# Patient Record
Sex: FEMALE | Race: WHITE | Employment: UNEMPLOYED | URBAN - METROPOLITAN AREA
[De-identification: names, ages, dates, MRNs, and addresses within clinical notes are randomized per-mention and may not be internally consistent; named-entity substitution may affect disease eponyms.]

---

## 2017-07-03 ENCOUNTER — ED HISTORICAL/CONVERTED ENCOUNTER (OUTPATIENT)
Dept: OTHER | Age: 72
End: 2017-07-03

## 2017-08-03 ENCOUNTER — OP HISTORICAL/CONVERTED ENCOUNTER (OUTPATIENT)
Dept: OTHER | Age: 72
End: 2017-08-03

## 2017-11-10 ENCOUNTER — OP HISTORICAL/CONVERTED ENCOUNTER (OUTPATIENT)
Dept: OTHER | Age: 72
End: 2017-11-10

## 2017-11-16 ENCOUNTER — OP HISTORICAL/CONVERTED ENCOUNTER (OUTPATIENT)
Dept: OTHER | Age: 72
End: 2017-11-16

## 2018-02-22 ENCOUNTER — TELEPHONE (OUTPATIENT)
Dept: DERMATOLOGY | Facility: AMBULATORY SURGERY CENTER | Age: 73
End: 2018-02-22

## 2018-02-22 NOTE — TELEPHONE ENCOUNTER
Attempted to reach pt for pre-op. Number provided is not a working number. This is also the same number as provided on the demographic sheet

## 2018-02-28 ENCOUNTER — OFFICE VISIT (OUTPATIENT)
Dept: DERMATOLOGY | Facility: AMBULATORY SURGERY CENTER | Age: 73
End: 2018-02-28

## 2018-02-28 VITALS
SYSTOLIC BLOOD PRESSURE: 160 MMHG | HEART RATE: 62 BPM | RESPIRATION RATE: 16 BRPM | TEMPERATURE: 98.5 F | OXYGEN SATURATION: 99 % | DIASTOLIC BLOOD PRESSURE: 78 MMHG

## 2018-02-28 DIAGNOSIS — C44.319 BASAL CELL CARCINOMA OF LEFT FOREHEAD: Primary | ICD-10-CM

## 2018-02-28 RX ORDER — IBANDRONATE SODIUM 150 MG/1
TABLET, FILM COATED ORAL
COMMUNITY
Start: 2018-02-12 | End: 2021-01-01

## 2018-02-28 RX ORDER — HYDROCHLOROTHIAZIDE 12.5 MG/1
12.5 TABLET ORAL DAILY
COMMUNITY
End: 2021-01-01

## 2018-02-28 RX ORDER — ERGOCALCIFEROL 1.25 MG/1
50000 CAPSULE ORAL
COMMUNITY
End: 2021-01-01

## 2018-02-28 RX ORDER — AMLODIPINE BESYLATE 5 MG/1
5 TABLET ORAL DAILY
COMMUNITY
End: 2021-01-01

## 2018-02-28 RX ORDER — METOPROLOL TARTRATE 50 MG/1
TABLET ORAL 2 TIMES DAILY
COMMUNITY
End: 2021-01-01

## 2018-02-28 RX ORDER — LOVASTATIN 10 MG/1
TABLET ORAL
COMMUNITY
End: 2021-01-01

## 2018-02-28 NOTE — PATIENT INSTRUCTIONS
WOUND CARE INSTRUCTIONS    1. Keep the dressing clean and dry and do not remove for 48 hours. 2. Then change the dressing once a day as follows:  a. Wash hands before and after each dressing change. b. Remove dressing and wash site gently with mild soap and water, rinse, and pat dry.  c. Apply an ointment (Bacitracin, Polysporin, Neosporin, Petroleum jelly or Aquaphor). d. Apply a non-stick (Telfa) dressing or Band-Aid to cover the wound. Remove pressure bandage on Friday. You may shower daily at this point, no bandage necessary. Glue will eventually come off within the next 2 weeks. If you still feel rough glue at this point, you may apply vaseline to site daily until fully removed. 3. Watch for:  BLEEDING: A small amount of drainage may occur. If bleeding occurs, elevate and rest the surgery site. Apply gauze and steady pressure for 15 minutes. If bleeding continues, call this office. INFECTION: Signs of infection include increased redness, pain, warmth, drainage of pus, and fever. If this occurs, call this office. 4. Special Instructions (follow any that are checked):  · [x] You have stitches that DO NOT need to be removed. · [x] Avoid bending at the waist and heavy lifting for two days. · [x] Sleep with your head elevated for the next two nights. · [x] Rest the surgery site and keep it elevated as much as possible for two days. · [x] You may apply an ice-pack for 10-15 minutes every waking hour for the rest of the day. · [] Eat a soft diet and avoid hot food and hot drinks for the rest of the day. · [] Other instructions: Follow up as directed. Take Tylenol or Ibuprofen for pain as needed. Once the site is healed with no remaining bandages or open areas, protect your surgical site and scar from the sun, as this area will be more sensitive.   Use a broad spectrum sunscreen SPF 30 or higher daily, and a chemical free product (one containing zinc oxide or titanium dioxide) is a good choice if the area is sensitive. You may begin to gently massage the surgical site in 2-3 weeks, rubbing in a circular motion along the scar. This can help reduce swelling and thickness of a scar. A scar cream may be used beginnning 1 month after the surgery. If you have any questions or concerns, please call our office Monday through Friday at [phone].

## 2018-02-28 NOTE — PROGRESS NOTES
This note is written by Clarence Ceja, as dictated by Rola Neal. Thu Cortez MD.    CC: Basal cell carcinoma on the left lateral forehead    History of present illness:     Pearl Gowers is a 68 y.o. female referred by TAMRA Olivia. 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 This is a new basal cell carcinoma present for a long time described as a crusted lesion with no prior treatment. Biopsy confirmed the diagnosis of basal cell carcinoma, and I reviewed the written pathology. She is feeling well and in her usual state of health today. She has no pain, no current illnesses, no other skin concerns. Her allergies, medications, medical, and social history are reviewed by me today. Exam:     She is an awake, alert, and oriented 68 y.o. female who appears well and in no distress. There is no preauricular, submandibular, or cervical lymphadenopathy. I examined her left lateral forehead. She has a 16 x 11 mm pearly pigmented nodule on her left lateral forehead. She confirms location. Assessment/plan:    1. Basal cell carcinoma, left lateral forehead. I discussed the diagnosis of basal cell carcinoma and summarized the pathology report. Mohs surgery is indicated by site, size, and histology. The procedure was discussed, verbal and written consent were obtained. I performed the procedure. One stage was required to reach a tumor free plane. The surgical defect was managed with intermediate repair. There were no complications. She will follow up as needed as the site heals. Indications, risks, and options were discussed with Pearl Gowers preoperatively. Risks including, but not limited to: pain, bleeding, infection, tumor recurrence, scarring and damage to motor and/or sensory nerves, were discussed. Pearl Gowers chose Mohs surgery. Pearl Gowers was an acceptable surgery candidate.     Pearl Gowers was placed in the appropriate position on the operating table in the Mohs surgery procedure room. The area was prepped and draped in the standard manner. Gentian violet was used to outline the clinical margins of the tumor. Local anesthesia was then obtained. The grossly visible tumor was then removed, an underlying layer was excised and mapped according to the Mohs technique, and the individual specimens examined microscopically. The process was repeated until microscopic examination of the tissue specimens confirmed a tumor-free plane. Hemostasis was obtained with electrosurgery and pressure. The wound was covered between stages with moist saline gauze. The wound management options of second intent healing, layered closure, local flap, and/or full thickness skin graft were discussed. Constance Ortiz understands the aims, risks, alternatives, and possible complications and elects to proceed with an intermediate layered closure. Wound margins were made vertical, edges undermined in the fascial plane, standing cones corrected at both poles followed by layered closure. The wound was closed with buried 5-0 polysorb suture in the subcutis to reduce tension on the skin edges, and skin edges were approximated with 5-0 polysorb suture in the dermis to reduce tension on the epidermis. The final closure length was 34 mm. The wound was bandaged with skin glue, Telfa, gauze and Coverroll. Wound care instructions (written and verbal) and a follow up appointment were given to Constance Ortiz before discharge. Constance Ortiz was discharged in good condition. 2. History of nonmelanoma skin cancer. I discussed the diagnosis and recommend routine examinations with TAMRA Pérez for surveillance. The documentation recorded by the scribe accurately reflects the service I personally performed and the decisions made by me.      [facility]   OFFICE PROCEDURE PROGRESS NOTE     Chart reviewed for the following: Yvonne Garcia MD, have reviewed the History, Physical and updated the Allergic reactions for [address] performed immediately prior to start of procedure:     Yvonne Larson. Rhonda Garcia MD, have performed the following reviews on Lucille Epstein prior to the start of the procedure:     * Patient was identified by name and date of birth   * Agreement on procedure being performed was verified   * Risks and Benefits explained to the patient   * Procedure site verified and marked as necessary   * Patient was positioned for comfort   * Consent was signed and verified     Time: 8:38 AM   Date of procedure: 2/28/2018  Procedure performed by: George Spangler.  Rhonda Garcia MD   Provider assisted by: RN   Patient assisted by: self   How tolerated by patient: tolerated the procedure well with no complications   Comments: none

## 2018-02-28 NOTE — MR AVS SNAPSHOT
[facility] [address] 
[phone] Patient: Roman Meraz MRN: [id number] :1945 Visit Information Date & Time Provider Department Dept. Phone Encounter #  
 2018  8:30 AM MD Violette Zamoraxiomara [id number] [phone] [id number] Upcoming Health Maintenance Date Due Hepatitis C Screening 1945 DTaP/Tdap/Td series (1 - Tdap) 1967 BREAST CANCER SCRN MAMMOGRAM 1996 FOBT Q 1 YEAR AGE 50-75 1996 ZOSTER VACCINE AGE 60> 2005 GLAUCOMA SCREENING Q2Y 2011 OSTEOPOROSIS SCREENING (DEXA) 2011 Pneumococcal 65+ Low/Medium Risk (1 of 2 - PCV13) 2011 MEDICARE YEARLY EXAM 2011 Influenza Age 5 to Adult 2017 Allergies as of 2018  Review Complete On: 2018 By: Michael Presley MD  
 No Known Allergies Current Immunizations  Never Reviewed No immunizations on file. Not reviewed this visit You Were Diagnosed With   
  
 Codes Comments Basal cell carcinoma of left forehead    -  Primary ICD-10-CM: C44.319 ICD-9-CM: 173.31 Vitals BP Pulse Temp Resp SpO2 Smoking Status 160/78 (BP 1 Location: Left arm, BP Patient Position: Sitting) 62 98.5 °F (36.9 °C) (Oral) 16 99% Current Every Day Smoker Your Updated Medication List  
  
   
This list is accurate as of 18  8:55 AM.  Always use your most recent med list. amLODIPine 5 mg tablet Commonly known as:  Lynnell Manual Take 5 mg by mouth daily. ASPIRIN PO Take  by mouth. hydroCHLOROthiazide 12.5 mg tablet Commonly known as:  HYDRODIURIL Take 12.5 mg by mouth daily. ibandronate 150 mg tablet Commonly known as:  BONIVA  
  
 lovastatin 10 mg tablet Commonly known as:  MEVACOR Take  by mouth nightly. metoprolol tartrate 50 mg tablet Commonly known as:  LOPRESSOR Take  by mouth two (2) times a day. VITAMIN D2 50,000 unit capsule Generic drug:  ergocalciferol Take 50,000 Units by mouth. Patient Instructions WOUND CARE INSTRUCTIONS 1. Keep the dressing clean and dry and do not remove for 48 hours. 2. Then change the dressing once a day as follows: 
a. Wash hands before and after each dressing change. b. Remove dressing and wash site gently with mild soap and water, rinse, and pat dry. 
c. Apply an ointment (Bacitracin, Polysporin, Neosporin, Petroleum jelly or Aquaphor). d. Apply a non-stick (Telfa) dressing or Band-Aid to cover the wound. Remove pressure bandage on Friday. You may shower daily at this point, no bandage necessary. Glue will eventually come off within the next 2 weeks. If you still feel rough glue at this point, you may apply vaseline to site daily until fully removed. 3. Watch for: BLEEDING: A small amount of drainage may occur. If bleeding occurs, elevate and rest the surgery site. Apply gauze and steady pressure for 15 minutes. If bleeding continues, call this office. INFECTION: Signs of infection include increased redness, pain, warmth, drainage of pus, and fever. If this occurs, call this office. 4. Special Instructions (follow any that are checked): 
· [x] You have stitches that DO NOT need to be removed. · [x] Avoid bending at the waist and heavy lifting for two days. · [x] Sleep with your head elevated for the next two nights. · [x] Rest the surgery site and keep it elevated as much as possible for two days. · [x] You may apply an ice-pack for 10-15 minutes every waking hour for the rest of the day. · [] Eat a soft diet and avoid hot food and hot drinks for the rest of the day. · [] Other instructions: Follow up as directed. Take Tylenol or Ibuprofen for pain as needed.  
 
 
Once the site is healed with no remaining bandages or open areas, protect your surgical site and scar from the sun, as this area will be more sensitive. Use a broad spectrum sunscreen SPF 30 or higher daily, and a chemical free product (one containing zinc oxide or titanium dioxide) is a good choice if the area is sensitive. You may begin to gently massage the surgical site in 2-3 weeks, rubbing in a circular motion along the scar. This can help reduce swelling and thickness of a scar. A scar cream may be used beginnning 1 month after the surgery. If you have any questions or concerns, please call our office Monday through Friday at [phone]. Introducing [facility]! [facility] introduces Clipsure patient portal. Now you can access parts of your medical record, email your doctor's office, and request medication refills online. 1. In your internet browser, go to [email or web address] 2. Click on the First Time User? Click Here link in the Sign In box. You will see the New Member Sign Up page. 3. Enter your Clipsure Access Code exactly as it appears below. You will not need to use this code after youve completed the sign-up process. If you do not sign up before the expiration date, you must request a new code. · Clipsure Access Code: [email or web address] Expires: 5/22/2018 12:51 PM 
 
4. Enter the last four digits of your Social Security Number (xxxx) and Date of Birth (mm/dd/yyyy) as indicated and click Submit. You will be taken to the next sign-up page. 5. Create a Gamervisiont ID. This will be your Clipsure login ID and cannot be changed, so think of one that is secure and easy to remember. 6. Create a Clipsure password. You can change your password at any time. 7. Enter your Password Reset Question and Answer. This can be used at a later time if you forget your password. 8. Enter your e-mail address. You will receive e-mail notification when new information is available in [address] 9. Click Sign Up. You can now view and download portions of your medical record.  
10. Click the Download Summary menu link to download a portable copy of your medical information. If you have questions, please visit the Frequently Asked Questions section of the Hublished website. Remember, Hublished is NOT to be used for urgent needs. For medical emergencies, dial 911. Now available from your iPhone and Android! Please provide this summary of care documentation to your next provider. If you have any questions after today's visit, please call [phone].

## 2018-10-22 ENCOUNTER — OP HISTORICAL/CONVERTED ENCOUNTER (OUTPATIENT)
Dept: OTHER | Age: 73
End: 2018-10-22

## 2021-01-01 ENCOUNTER — APPOINTMENT (OUTPATIENT)
Dept: GENERAL RADIOLOGY | Age: 76
DRG: 177 | End: 2021-01-01
Attending: INTERNAL MEDICINE
Payer: MEDICARE

## 2021-01-01 ENCOUNTER — APPOINTMENT (OUTPATIENT)
Dept: GENERAL RADIOLOGY | Age: 76
DRG: 193 | End: 2021-01-01
Attending: NURSE PRACTITIONER
Payer: MEDICARE

## 2021-01-01 ENCOUNTER — APPOINTMENT (OUTPATIENT)
Dept: GENERAL RADIOLOGY | Age: 76
DRG: 177 | End: 2021-01-01
Attending: STUDENT IN AN ORGANIZED HEALTH CARE EDUCATION/TRAINING PROGRAM
Payer: MEDICARE

## 2021-01-01 ENCOUNTER — APPOINTMENT (OUTPATIENT)
Dept: ENDOSCOPY | Age: 76
DRG: 193 | End: 2021-01-01
Attending: INTERNAL MEDICINE
Payer: MEDICARE

## 2021-01-01 ENCOUNTER — APPOINTMENT (OUTPATIENT)
Dept: GENERAL RADIOLOGY | Age: 76
DRG: 193 | End: 2021-01-01
Attending: STUDENT IN AN ORGANIZED HEALTH CARE EDUCATION/TRAINING PROGRAM
Payer: MEDICARE

## 2021-01-01 ENCOUNTER — APPOINTMENT (OUTPATIENT)
Dept: GENERAL RADIOLOGY | Age: 76
DRG: 177 | End: 2021-01-01
Attending: EMERGENCY MEDICINE
Payer: MEDICARE

## 2021-01-01 ENCOUNTER — ANESTHESIA (OUTPATIENT)
Dept: ENDOSCOPY | Age: 76
DRG: 193 | End: 2021-01-01
Payer: MEDICARE

## 2021-01-01 ENCOUNTER — HOSPITAL ENCOUNTER (OUTPATIENT)
Dept: CT IMAGING | Age: 76
Discharge: HOME | End: 2021-03-04
Payer: MEDICARE

## 2021-01-01 ENCOUNTER — APPOINTMENT (OUTPATIENT)
Dept: CT IMAGING | Age: 76
DRG: 193 | End: 2021-01-01
Attending: STUDENT IN AN ORGANIZED HEALTH CARE EDUCATION/TRAINING PROGRAM
Payer: MEDICARE

## 2021-01-01 ENCOUNTER — HOSPITAL ENCOUNTER (INPATIENT)
Age: 76
LOS: 7 days | Discharge: HOME HEALTH | DRG: 193 | End: 2021-11-16
Attending: STUDENT IN AN ORGANIZED HEALTH CARE EDUCATION/TRAINING PROGRAM | Admitting: INTERNAL MEDICINE
Payer: MEDICARE

## 2021-01-01 ENCOUNTER — ANESTHESIA EVENT (OUTPATIENT)
Dept: ENDOSCOPY | Age: 76
DRG: 193 | End: 2021-01-01
Payer: MEDICARE

## 2021-01-01 ENCOUNTER — APPOINTMENT (OUTPATIENT)
Dept: CT IMAGING | Age: 76
DRG: 177 | End: 2021-01-01
Attending: EMERGENCY MEDICINE
Payer: MEDICARE

## 2021-01-01 ENCOUNTER — APPOINTMENT (OUTPATIENT)
Dept: GENERAL RADIOLOGY | Age: 76
DRG: 193 | End: 2021-01-01
Attending: INTERNAL MEDICINE
Payer: MEDICARE

## 2021-01-01 ENCOUNTER — HOSPITAL ENCOUNTER (INPATIENT)
Age: 76
LOS: 15 days | DRG: 177 | End: 2021-12-23
Attending: EMERGENCY MEDICINE | Admitting: INTERNAL MEDICINE
Payer: MEDICARE

## 2021-01-01 ENCOUNTER — APPOINTMENT (OUTPATIENT)
Dept: NON INVASIVE DIAGNOSTICS | Age: 76
DRG: 177 | End: 2021-01-01
Attending: INTERNAL MEDICINE
Payer: MEDICARE

## 2021-01-01 VITALS
RESPIRATION RATE: 22 BRPM | BODY MASS INDEX: 14.66 KG/M2 | DIASTOLIC BLOOD PRESSURE: 76 MMHG | SYSTOLIC BLOOD PRESSURE: 190 MMHG | HEIGHT: 65 IN | WEIGHT: 87.96 LBS | TEMPERATURE: 97.6 F | OXYGEN SATURATION: 97 % | HEART RATE: 64 BPM

## 2021-01-01 VITALS
DIASTOLIC BLOOD PRESSURE: 69 MMHG | WEIGHT: 81.2 LBS | SYSTOLIC BLOOD PRESSURE: 145 MMHG | HEIGHT: 64 IN | OXYGEN SATURATION: 95 % | TEMPERATURE: 97.9 F | RESPIRATION RATE: 21 BRPM | HEART RATE: 87 BPM | BODY MASS INDEX: 13.86 KG/M2

## 2021-01-01 VITALS — WEIGHT: 100 LBS | BODY MASS INDEX: 16.66 KG/M2 | HEIGHT: 65 IN

## 2021-01-01 DIAGNOSIS — R62.51 FAILURE TO THRIVE (CHILD): ICD-10-CM

## 2021-01-01 DIAGNOSIS — R06.02 SOB (SHORTNESS OF BREATH): Primary | ICD-10-CM

## 2021-01-01 DIAGNOSIS — J96.01 ACUTE RESPIRATORY FAILURE WITH HYPOXIA (HCC): Primary | ICD-10-CM

## 2021-01-01 DIAGNOSIS — J44.9 CHRONIC OBSTRUCTIVE PULMONARY DISEASE, UNSPECIFIED COPD TYPE (HCC): ICD-10-CM

## 2021-01-01 DIAGNOSIS — F17.210 NICOTINE DEPENDENCE, CIGARETTES, UNCOMPLICATED: ICD-10-CM

## 2021-01-01 DIAGNOSIS — U07.1 COVID-19: ICD-10-CM

## 2021-01-01 LAB
25(OH)D3 SERPL-MCNC: 48.8 NG/ML (ref 30–100)
25(OH)D3 SERPL-MCNC: 51.2 NG/ML (ref 30–100)
ALBUMIN SERPL-MCNC: 2.2 G/DL (ref 3.5–5)
ALBUMIN SERPL-MCNC: 2.4 G/DL (ref 3.5–5)
ALBUMIN SERPL-MCNC: 2.7 G/DL (ref 3.5–5)
ALBUMIN SERPL-MCNC: 3 G/DL (ref 3.5–5)
ALBUMIN SERPL-MCNC: 3.1 G/DL (ref 3.5–5)
ALBUMIN SERPL-MCNC: 3.8 G/DL (ref 3.5–5)
ALBUMIN/GLOB SERPL: 0.6 {RATIO} (ref 1.1–2.2)
ALBUMIN/GLOB SERPL: 0.8 {RATIO} (ref 1.1–2.2)
ALBUMIN/GLOB SERPL: 0.9 {RATIO} (ref 1.1–2.2)
ALBUMIN/GLOB SERPL: 1 {RATIO} (ref 1.1–2.2)
ALP SERPL-CCNC: 67 U/L (ref 45–117)
ALP SERPL-CCNC: 71 U/L (ref 45–117)
ALP SERPL-CCNC: 72 U/L (ref 45–117)
ALP SERPL-CCNC: 72 U/L (ref 45–117)
ALT SERPL-CCNC: 20 U/L (ref 12–78)
ALT SERPL-CCNC: 29 U/L (ref 12–78)
ALT SERPL-CCNC: 39 U/L (ref 12–78)
ALT SERPL-CCNC: 40 U/L (ref 12–78)
ANION GAP SERPL CALC-SCNC: 10 MMOL/L (ref 5–15)
ANION GAP SERPL CALC-SCNC: 3 MMOL/L (ref 5–15)
ANION GAP SERPL CALC-SCNC: 4 MMOL/L (ref 5–15)
ANION GAP SERPL CALC-SCNC: 6 MMOL/L (ref 5–15)
ANION GAP SERPL CALC-SCNC: 7 MMOL/L (ref 5–15)
ANION GAP SERPL CALC-SCNC: 7 MMOL/L (ref 5–15)
ANION GAP SERPL CALC-SCNC: 8 MMOL/L (ref 5–15)
ANION GAP SERPL CALC-SCNC: 9 MMOL/L (ref 5–15)
AST SERPL W P-5'-P-CCNC: 21 U/L (ref 15–37)
AST SERPL W P-5'-P-CCNC: 24 U/L (ref 15–37)
AST SERPL W P-5'-P-CCNC: 31 U/L (ref 15–37)
AST SERPL W P-5'-P-CCNC: 51 U/L (ref 15–37)
ATRIAL RATE: 170 BPM
ATRIAL RATE: 97 BPM
BACTERIA SPEC CULT: NORMAL
BASOPHILS # BLD: 0 K/UL (ref 0–0.1)
BASOPHILS # BLD: 0.1 K/UL (ref 0–0.1)
BASOPHILS # BLD: 0.1 K/UL (ref 0–0.1)
BASOPHILS NFR BLD: 0 % (ref 0–1)
BILIRUB SERPL-MCNC: 0.4 MG/DL (ref 0.2–1)
BILIRUB SERPL-MCNC: 0.4 MG/DL (ref 0.2–1)
BILIRUB SERPL-MCNC: 0.6 MG/DL (ref 0.2–1)
BILIRUB SERPL-MCNC: 0.7 MG/DL (ref 0.2–1)
BNP SERPL-MCNC: 1228 PG/ML
BNP SERPL-MCNC: 244 PG/ML
BUN SERPL-MCNC: 15 MG/DL (ref 6–20)
BUN SERPL-MCNC: 17 MG/DL (ref 6–20)
BUN SERPL-MCNC: 21 MG/DL (ref 6–20)
BUN SERPL-MCNC: 22 MG/DL (ref 6–20)
BUN SERPL-MCNC: 22 MG/DL (ref 6–20)
BUN SERPL-MCNC: 23 MG/DL (ref 6–20)
BUN SERPL-MCNC: 23 MG/DL (ref 6–20)
BUN SERPL-MCNC: 26 MG/DL (ref 6–20)
BUN SERPL-MCNC: 31 MG/DL (ref 6–20)
BUN SERPL-MCNC: 33 MG/DL (ref 6–20)
BUN SERPL-MCNC: 35 MG/DL (ref 6–20)
BUN SERPL-MCNC: 39 MG/DL (ref 6–20)
BUN SERPL-MCNC: 41 MG/DL (ref 6–20)
BUN SERPL-MCNC: 48 MG/DL (ref 6–20)
BUN SERPL-MCNC: 50 MG/DL (ref 6–20)
BUN/CREAT SERPL: 31 (ref 12–20)
BUN/CREAT SERPL: 32 (ref 12–20)
BUN/CREAT SERPL: 35 (ref 12–20)
BUN/CREAT SERPL: 35 (ref 12–20)
BUN/CREAT SERPL: 38 (ref 12–20)
BUN/CREAT SERPL: 38 (ref 12–20)
BUN/CREAT SERPL: 42 (ref 12–20)
BUN/CREAT SERPL: 43 (ref 12–20)
BUN/CREAT SERPL: 46 (ref 12–20)
BUN/CREAT SERPL: 50 (ref 12–20)
BUN/CREAT SERPL: 51 (ref 12–20)
BUN/CREAT SERPL: 53 (ref 12–20)
BUN/CREAT SERPL: 61 (ref 12–20)
BUN/CREAT SERPL: 63 (ref 12–20)
BUN/CREAT SERPL: 64 (ref 12–20)
CA-I BLD-MCNC: 10 MG/DL (ref 8.5–10.1)
CA-I BLD-MCNC: 10.3 MG/DL (ref 8.5–10.1)
CA-I BLD-MCNC: 10.5 MG/DL (ref 8.5–10.1)
CA-I BLD-MCNC: 8 MG/DL (ref 8.5–10.1)
CA-I BLD-MCNC: 8.2 MG/DL (ref 8.5–10.1)
CA-I BLD-MCNC: 8.4 MG/DL (ref 8.5–10.1)
CA-I BLD-MCNC: 8.6 MG/DL (ref 8.5–10.1)
CA-I BLD-MCNC: 8.6 MG/DL (ref 8.5–10.1)
CA-I BLD-MCNC: 8.7 MG/DL (ref 8.5–10.1)
CA-I BLD-MCNC: 8.7 MG/DL (ref 8.5–10.1)
CA-I BLD-MCNC: 8.9 MG/DL (ref 8.5–10.1)
CA-I BLD-MCNC: 8.9 MG/DL (ref 8.5–10.1)
CA-I BLD-MCNC: 9.3 MG/DL (ref 8.5–10.1)
CA-I BLD-MCNC: 9.4 MG/DL (ref 8.5–10.1)
CA-I BLD-MCNC: 9.6 MG/DL (ref 8.5–10.1)
CALCULATED P AXIS, ECG09: 81 DEGREES
CALCULATED R AXIS, ECG10: 63 DEGREES
CALCULATED R AXIS, ECG10: 68 DEGREES
CALCULATED T AXIS, ECG11: 109 DEGREES
CALCULATED T AXIS, ECG11: 81 DEGREES
CHLORIDE SERPL-SCNC: 102 MMOL/L (ref 97–108)
CHLORIDE SERPL-SCNC: 102 MMOL/L (ref 97–108)
CHLORIDE SERPL-SCNC: 104 MMOL/L (ref 97–108)
CHLORIDE SERPL-SCNC: 104 MMOL/L (ref 97–108)
CHLORIDE SERPL-SCNC: 105 MMOL/L (ref 97–108)
CHLORIDE SERPL-SCNC: 106 MMOL/L (ref 97–108)
CHLORIDE SERPL-SCNC: 106 MMOL/L (ref 97–108)
CHLORIDE SERPL-SCNC: 108 MMOL/L (ref 97–108)
CHLORIDE SERPL-SCNC: 94 MMOL/L (ref 97–108)
CHLORIDE SERPL-SCNC: 96 MMOL/L (ref 97–108)
CHLORIDE SERPL-SCNC: 96 MMOL/L (ref 97–108)
CHLORIDE SERPL-SCNC: 97 MMOL/L (ref 97–108)
CHLORIDE SERPL-SCNC: 97 MMOL/L (ref 97–108)
CHLORIDE SERPL-SCNC: 98 MMOL/L (ref 97–108)
CHLORIDE SERPL-SCNC: 99 MMOL/L (ref 97–108)
CO2 SERPL-SCNC: 24 MMOL/L (ref 21–32)
CO2 SERPL-SCNC: 25 MMOL/L (ref 21–32)
CO2 SERPL-SCNC: 26 MMOL/L (ref 21–32)
CO2 SERPL-SCNC: 27 MMOL/L (ref 21–32)
CO2 SERPL-SCNC: 27 MMOL/L (ref 21–32)
CO2 SERPL-SCNC: 28 MMOL/L (ref 21–32)
CO2 SERPL-SCNC: 28 MMOL/L (ref 21–32)
CO2 SERPL-SCNC: 29 MMOL/L (ref 21–32)
COVID-19 RAPID TEST, COVR: DETECTED
COVID-19 RAPID TEST, COVR: NOT DETECTED
CREAT SERPL-MCNC: 0.49 MG/DL (ref 0.55–1.02)
CREAT SERPL-MCNC: 0.49 MG/DL (ref 0.55–1.02)
CREAT SERPL-MCNC: 0.5 MG/DL (ref 0.55–1.02)
CREAT SERPL-MCNC: 0.56 MG/DL (ref 0.55–1.02)
CREAT SERPL-MCNC: 0.58 MG/DL (ref 0.55–1.02)
CREAT SERPL-MCNC: 0.58 MG/DL (ref 0.55–1.02)
CREAT SERPL-MCNC: 0.61 MG/DL (ref 0.55–1.02)
CREAT SERPL-MCNC: 0.66 MG/DL (ref 0.55–1.02)
CREAT SERPL-MCNC: 0.66 MG/DL (ref 0.55–1.02)
CREAT SERPL-MCNC: 0.67 MG/DL (ref 0.55–1.02)
CREAT SERPL-MCNC: 0.67 MG/DL (ref 0.55–1.02)
CREAT SERPL-MCNC: 0.72 MG/DL (ref 0.55–1.02)
CREAT SERPL-MCNC: 0.77 MG/DL (ref 0.55–1.02)
CREAT SERPL-MCNC: 0.78 MG/DL (ref 0.55–1.02)
CREAT SERPL-MCNC: 0.9 MG/DL (ref 0.55–1.02)
CRP SERPL-MCNC: 0.63 MG/DL (ref 0–0.6)
CRP SERPL-MCNC: 1.51 MG/DL (ref 0–0.6)
D DIMER PPP FEU-MCNC: 0.53 UG/ML(FEU)
D DIMER PPP FEU-MCNC: 0.69 UG/ML(FEU)
D DIMER PPP FEU-MCNC: 0.97 UG/ML(FEU)
D DIMER PPP FEU-MCNC: 1.03 UG/ML(FEU)
D DIMER PPP FEU-MCNC: 1.12 UG/ML(FEU)
DIAGNOSIS, 93000: NORMAL
DIAGNOSIS, 93000: NORMAL
DIFFERENTIAL METHOD BLD: ABNORMAL
ECHO AO ASC DIAM: 2.8 CM
ECHO AO ROOT DIAM: 3.2 CM
ECHO AR MAX VEL PISA: 86.3 CM/S
ECHO AV AREA PEAK VELOCITY: 2.38 CM2
ECHO AV AREA VTI: 2.47 CM2
ECHO AV AREA/BSA PEAK VELOCITY: 1.7 CM2/M2
ECHO AV AREA/BSA VTI: 1.8 CM2/M2
ECHO AV MEAN GRADIENT: 2 MMHG
ECHO AV MEAN VELOCITY: 68.9 CM/S
ECHO AV PEAK GRADIENT: 6 MMHG
ECHO AV PEAK VELOCITY: 127 CM/S
ECHO AV REGURGITANT PHT: 200 MS
ECHO AV VTI: 23.9 CM
ECHO EST RA PRESSURE: 3 MMHG
ECHO LA AREA 2C: 6.82 CM2
ECHO LA AREA 4C: 9.4 CM2
ECHO LA MAJOR AXIS: 3.73 CM
ECHO LA MINOR AXIS: 2.7 CM
ECHO LV E' SEPTAL VELOCITY: 4.63 CM/S
ECHO LV EDV A2C: 17 CM3
ECHO LV EDV A4C: 31 CM3
ECHO LV ESV A2C: 14 CM3
ECHO LV ESV A4C: 15 CM3
ECHO LV INTERNAL DIMENSION DIASTOLIC: 3.83 CM (ref 3.9–5.3)
ECHO LV INTERNAL DIMENSION SYSTOLIC: 2.41 CM
ECHO LV IVSD: 1.11 CM (ref 0.6–0.9)
ECHO LV MASS 2D: 135.4 G (ref 67–162)
ECHO LV MASS INDEX 2D: 98.1 G/M2 (ref 43–95)
ECHO LV POSTERIOR WALL DIASTOLIC: 1.08 CM (ref 0.6–0.9)
ECHO LVOT DIAM: 1.8 CM
ECHO LVOT PEAK GRADIENT: 6 MMHG
ECHO LVOT PEAK VELOCITY: 119 CM/S
ECHO LVOT SV: 59 CM3
ECHO LVOT VTI: 23.2 CM
ECHO MV A VELOCITY: 123 CM/S
ECHO MV AREA PHT: 3.28 CM2
ECHO MV E VELOCITY: 76 CM/S
ECHO MV E/A RATIO: 0.62
ECHO MV E/E' SEPTAL: 16.41
ECHO MV MAX VELOCITY: 132 CM/S
ECHO MV MEAN GRADIENT: 2 MMHG
ECHO MV PEAK GRADIENT: 7 MMHG
ECHO MV PRESSURE HALF TIME (PHT): 67 MS
ECHO MV REGURGITANT VTIA: 180 CM
ECHO MV VTI: 30.6 CM
ECHO PV MAX VELOCITY: 88.1 CM/S
ECHO PV MEAN GRADIENT: 1 MMHG
ECHO PV PEAK INSTANTANEOUS GRADIENT SYSTOLIC: 3 MMHG
ECHO PV VTI: 18.7 CM
ECHO PVEIN PEAK D VELOCITY: 46.4 CM/S
ECHO PVEIN PEAK S VELOCITY: 93.3 CM/S
ECHO RA AREA 4C: 8.4 CM2
ECHO RIGHT VENTRICULAR SYSTOLIC PRESSURE (RVSP): 6 MMHG
ECHO RV TAPSE: 1.5 CM (ref 1.5–2)
ECHO TV MAX VELOCITY: 85.1 CM/S
ECHO TV MEAN GRADIENT: 80 MMHG
ECHO TV REGURGITANT PEAK GRADIENT: 3 MMHG
EOSINOPHIL # BLD: 0 K/UL (ref 0–0.4)
EOSINOPHIL # BLD: 0.1 K/UL (ref 0–0.4)
EOSINOPHIL NFR BLD: 0 % (ref 0–7)
EOSINOPHIL NFR BLD: 1 % (ref 0–7)
ERYTHROCYTE [DISTWIDTH] IN BLOOD BY AUTOMATED COUNT: 14.5 % (ref 11.5–14.5)
ERYTHROCYTE [DISTWIDTH] IN BLOOD BY AUTOMATED COUNT: 14.6 % (ref 11.5–14.5)
ERYTHROCYTE [DISTWIDTH] IN BLOOD BY AUTOMATED COUNT: 14.6 % (ref 11.5–14.5)
ERYTHROCYTE [DISTWIDTH] IN BLOOD BY AUTOMATED COUNT: 14.8 % (ref 11.5–14.5)
ERYTHROCYTE [DISTWIDTH] IN BLOOD BY AUTOMATED COUNT: 15.1 % (ref 11.5–14.5)
ERYTHROCYTE [DISTWIDTH] IN BLOOD BY AUTOMATED COUNT: 15.1 % (ref 11.5–14.5)
ERYTHROCYTE [DISTWIDTH] IN BLOOD BY AUTOMATED COUNT: 15.3 % (ref 11.5–14.5)
ERYTHROCYTE [DISTWIDTH] IN BLOOD BY AUTOMATED COUNT: 16 % (ref 11.5–14.5)
ERYTHROCYTE [DISTWIDTH] IN BLOOD BY AUTOMATED COUNT: 16.1 % (ref 11.5–14.5)
ERYTHROCYTE [DISTWIDTH] IN BLOOD BY AUTOMATED COUNT: 16.2 % (ref 11.5–14.5)
ERYTHROCYTE [DISTWIDTH] IN BLOOD BY AUTOMATED COUNT: 16.3 % (ref 11.5–14.5)
ERYTHROCYTE [DISTWIDTH] IN BLOOD BY AUTOMATED COUNT: 16.7 % (ref 11.5–14.5)
ERYTHROCYTE [DISTWIDTH] IN BLOOD BY AUTOMATED COUNT: 17.2 % (ref 11.5–14.5)
ERYTHROCYTE [DISTWIDTH] IN BLOOD BY AUTOMATED COUNT: 17.2 % (ref 11.5–14.5)
FERRITIN SERPL-MCNC: 110 NG/ML (ref 8–252)
FERRITIN SERPL-MCNC: 117 NG/ML (ref 8–252)
GLOBULIN SER CALC-MCNC: 3.4 G/DL (ref 2–4)
GLOBULIN SER CALC-MCNC: 3.7 G/DL (ref 2–4)
GLOBULIN SER CALC-MCNC: 3.7 G/DL (ref 2–4)
GLOBULIN SER CALC-MCNC: 3.8 G/DL (ref 2–4)
GLUCOSE BLD STRIP.AUTO-MCNC: 121 MG/DL (ref 65–117)
GLUCOSE BLD STRIP.AUTO-MCNC: 173 MG/DL (ref 65–117)
GLUCOSE BLD STRIP.AUTO-MCNC: 231 MG/DL (ref 65–117)
GLUCOSE BLD STRIP.AUTO-MCNC: 231 MG/DL (ref 65–117)
GLUCOSE SERPL-MCNC: 103 MG/DL (ref 65–100)
GLUCOSE SERPL-MCNC: 108 MG/DL (ref 65–100)
GLUCOSE SERPL-MCNC: 110 MG/DL (ref 65–100)
GLUCOSE SERPL-MCNC: 114 MG/DL (ref 65–100)
GLUCOSE SERPL-MCNC: 116 MG/DL (ref 65–100)
GLUCOSE SERPL-MCNC: 118 MG/DL (ref 65–100)
GLUCOSE SERPL-MCNC: 120 MG/DL (ref 65–100)
GLUCOSE SERPL-MCNC: 121 MG/DL (ref 65–100)
GLUCOSE SERPL-MCNC: 134 MG/DL (ref 65–100)
GLUCOSE SERPL-MCNC: 142 MG/DL (ref 65–100)
GLUCOSE SERPL-MCNC: 172 MG/DL (ref 65–100)
GLUCOSE SERPL-MCNC: 216 MG/DL (ref 65–100)
GLUCOSE SERPL-MCNC: 91 MG/DL (ref 65–100)
HCT VFR BLD AUTO: 30.5 % (ref 35–47)
HCT VFR BLD AUTO: 33.7 % (ref 35–47)
HCT VFR BLD AUTO: 34.7 % (ref 35–47)
HCT VFR BLD AUTO: 35.7 % (ref 35–47)
HCT VFR BLD AUTO: 37.5 % (ref 35–47)
HCT VFR BLD AUTO: 37.5 % (ref 35–47)
HCT VFR BLD AUTO: 38 % (ref 35–47)
HCT VFR BLD AUTO: 38.3 % (ref 35–47)
HCT VFR BLD AUTO: 38.8 % (ref 35–47)
HCT VFR BLD AUTO: 39.7 % (ref 35–47)
HCT VFR BLD AUTO: 40 % (ref 35–47)
HCT VFR BLD AUTO: 41.6 % (ref 35–47)
HCT VFR BLD AUTO: 43 % (ref 35–47)
HCT VFR BLD AUTO: 43.5 % (ref 35–47)
HGB BLD-MCNC: 10.5 G/DL (ref 11.5–16)
HGB BLD-MCNC: 11.1 G/DL (ref 11.5–16)
HGB BLD-MCNC: 11.6 G/DL (ref 11.5–16)
HGB BLD-MCNC: 11.7 G/DL (ref 11.5–16)
HGB BLD-MCNC: 12.4 G/DL (ref 11.5–16)
HGB BLD-MCNC: 12.6 G/DL (ref 11.5–16)
HGB BLD-MCNC: 12.6 G/DL (ref 11.5–16)
HGB BLD-MCNC: 12.7 G/DL (ref 11.5–16)
HGB BLD-MCNC: 12.8 G/DL (ref 11.5–16)
HGB BLD-MCNC: 13.2 G/DL (ref 11.5–16)
HGB BLD-MCNC: 13.3 G/DL (ref 11.5–16)
HGB BLD-MCNC: 13.9 G/DL (ref 11.5–16)
HGB BLD-MCNC: 14.4 G/DL (ref 11.5–16)
HGB BLD-MCNC: 14.5 G/DL (ref 11.5–16)
IL6 SERPL-MCNC: 4 PG/ML (ref 0–13)
IMM GRANULOCYTES # BLD AUTO: 0 K/UL
IMM GRANULOCYTES # BLD AUTO: 0 K/UL (ref 0–0.04)
IMM GRANULOCYTES # BLD AUTO: 0.1 K/UL (ref 0–0.04)
IMM GRANULOCYTES # BLD AUTO: 0.2 K/UL (ref 0–0.04)
IMM GRANULOCYTES # BLD AUTO: 0.4 K/UL (ref 0–0.04)
IMM GRANULOCYTES NFR BLD AUTO: 0 %
IMM GRANULOCYTES NFR BLD AUTO: 0 % (ref 0–0.5)
IMM GRANULOCYTES NFR BLD AUTO: 1 % (ref 0–0.5)
LA VOL DISK BP: 19 CM3 (ref 22–52)
LACTATE SERPL-SCNC: 1.4 MMOL/L (ref 0.4–2)
LDH SERPL L TO P-CCNC: 189 U/L (ref 81–246)
LDH SERPL L TO P-CCNC: 205 U/L (ref 81–246)
LVOT MG: 3 MMHG
LYMPHOCYTES # BLD: 0.4 K/UL (ref 0.8–3.5)
LYMPHOCYTES # BLD: 0.5 K/UL (ref 0.8–3.5)
LYMPHOCYTES # BLD: 0.7 K/UL (ref 0.8–3.5)
LYMPHOCYTES # BLD: 0.8 K/UL (ref 0.8–3.5)
LYMPHOCYTES # BLD: 0.9 K/UL (ref 0.8–3.5)
LYMPHOCYTES # BLD: 1 K/UL (ref 0.8–3.5)
LYMPHOCYTES # BLD: 1.6 K/UL (ref 0.8–3.5)
LYMPHOCYTES # BLD: 1.7 K/UL (ref 0.8–3.5)
LYMPHOCYTES # BLD: 1.7 K/UL (ref 0.8–3.5)
LYMPHOCYTES # BLD: 1.8 K/UL (ref 0.8–3.5)
LYMPHOCYTES # BLD: 2 K/UL (ref 0.8–3.5)
LYMPHOCYTES # BLD: 2 K/UL (ref 0.8–3.5)
LYMPHOCYTES # BLD: 3 K/UL (ref 0.8–3.5)
LYMPHOCYTES NFR BLD: 1 % (ref 12–49)
LYMPHOCYTES NFR BLD: 10 % (ref 12–49)
LYMPHOCYTES NFR BLD: 11 % (ref 12–49)
LYMPHOCYTES NFR BLD: 11 % (ref 12–49)
LYMPHOCYTES NFR BLD: 15 % (ref 12–49)
LYMPHOCYTES NFR BLD: 15 % (ref 12–49)
LYMPHOCYTES NFR BLD: 19 % (ref 12–49)
LYMPHOCYTES NFR BLD: 25 % (ref 12–49)
LYMPHOCYTES NFR BLD: 4 % (ref 12–49)
LYMPHOCYTES NFR BLD: 5 % (ref 12–49)
LYMPHOCYTES NFR BLD: 8 % (ref 12–49)
LYMPHOCYTES NFR BLD: 8 % (ref 12–49)
LYMPHOCYTES NFR BLD: 9 % (ref 12–49)
MAGNESIUM SERPL-MCNC: 2.3 MG/DL (ref 1.6–2.4)
MCH RBC QN AUTO: 30 PG (ref 26–34)
MCH RBC QN AUTO: 30.1 PG (ref 26–34)
MCH RBC QN AUTO: 30.1 PG (ref 26–34)
MCH RBC QN AUTO: 30.2 PG (ref 26–34)
MCH RBC QN AUTO: 30.3 PG (ref 26–34)
MCH RBC QN AUTO: 30.3 PG (ref 26–34)
MCH RBC QN AUTO: 30.5 PG (ref 26–34)
MCH RBC QN AUTO: 30.6 PG (ref 26–34)
MCH RBC QN AUTO: 30.7 PG (ref 26–34)
MCH RBC QN AUTO: 30.7 PG (ref 26–34)
MCH RBC QN AUTO: 30.9 PG (ref 26–34)
MCH RBC QN AUTO: 31 PG (ref 26–34)
MCHC RBC AUTO-ENTMCNC: 32.8 G/DL (ref 30–36.5)
MCHC RBC AUTO-ENTMCNC: 32.9 G/DL (ref 30–36.5)
MCHC RBC AUTO-ENTMCNC: 33 G/DL (ref 30–36.5)
MCHC RBC AUTO-ENTMCNC: 33 G/DL (ref 30–36.5)
MCHC RBC AUTO-ENTMCNC: 33.1 G/DL (ref 30–36.5)
MCHC RBC AUTO-ENTMCNC: 33.2 G/DL (ref 30–36.5)
MCHC RBC AUTO-ENTMCNC: 33.2 G/DL (ref 30–36.5)
MCHC RBC AUTO-ENTMCNC: 33.3 G/DL (ref 30–36.5)
MCHC RBC AUTO-ENTMCNC: 33.4 G/DL (ref 30–36.5)
MCHC RBC AUTO-ENTMCNC: 33.4 G/DL (ref 30–36.5)
MCHC RBC AUTO-ENTMCNC: 33.5 G/DL (ref 30–36.5)
MCHC RBC AUTO-ENTMCNC: 33.5 G/DL (ref 30–36.5)
MCHC RBC AUTO-ENTMCNC: 33.6 G/DL (ref 30–36.5)
MCHC RBC AUTO-ENTMCNC: 34.4 G/DL (ref 30–36.5)
MCV RBC AUTO: 90 FL (ref 80–99)
MCV RBC AUTO: 90.1 FL (ref 80–99)
MCV RBC AUTO: 90.3 FL (ref 80–99)
MCV RBC AUTO: 90.9 FL (ref 80–99)
MCV RBC AUTO: 91 FL (ref 80–99)
MCV RBC AUTO: 91.2 FL (ref 80–99)
MCV RBC AUTO: 91.2 FL (ref 80–99)
MCV RBC AUTO: 91.5 FL (ref 80–99)
MCV RBC AUTO: 91.7 FL (ref 80–99)
MCV RBC AUTO: 91.9 FL (ref 80–99)
MCV RBC AUTO: 92.4 FL (ref 80–99)
MCV RBC AUTO: 92.4 FL (ref 80–99)
MCV RBC AUTO: 92.6 FL (ref 80–99)
MCV RBC AUTO: 93 FL (ref 80–99)
MONOCYTES # BLD: 0.4 K/UL (ref 0–1)
MONOCYTES # BLD: 0.5 K/UL (ref 0–1)
MONOCYTES # BLD: 0.6 K/UL (ref 0–1)
MONOCYTES # BLD: 0.9 K/UL (ref 0–1)
MONOCYTES # BLD: 1 K/UL (ref 0–1)
MONOCYTES # BLD: 1.1 K/UL (ref 0–1)
MONOCYTES # BLD: 1.3 K/UL (ref 0–1)
MONOCYTES # BLD: 1.4 K/UL (ref 0–1)
MONOCYTES # BLD: 1.4 K/UL (ref 0–1)
MONOCYTES # BLD: 1.9 K/UL (ref 0–1)
MONOCYTES # BLD: 2.1 K/UL (ref 0–1)
MONOCYTES # BLD: 2.5 K/UL (ref 0–1)
MONOCYTES # BLD: 2.8 K/UL (ref 0–1)
MONOCYTES NFR BLD: 10 % (ref 5–13)
MONOCYTES NFR BLD: 11 % (ref 5–13)
MONOCYTES NFR BLD: 12 % (ref 5–13)
MONOCYTES NFR BLD: 3 % (ref 5–13)
MONOCYTES NFR BLD: 6 % (ref 5–13)
MONOCYTES NFR BLD: 8 % (ref 5–13)
MONOCYTES NFR BLD: 9 % (ref 5–13)
MONOCYTES NFR BLD: 9 % (ref 5–13)
MV DEC SLOPE: 3310 MM/S2
MV DEC SLOPE: 3310 MM/S2
NEUTS SEG # BLD: 10.5 K/UL (ref 1.8–8)
NEUTS SEG # BLD: 12 K/UL (ref 1.8–8)
NEUTS SEG # BLD: 13.8 K/UL (ref 1.8–8)
NEUTS SEG # BLD: 14.1 K/UL (ref 1.8–8)
NEUTS SEG # BLD: 14.9 K/UL (ref 1.8–8)
NEUTS SEG # BLD: 15.4 K/UL (ref 1.8–8)
NEUTS SEG # BLD: 16 K/UL (ref 1.8–8)
NEUTS SEG # BLD: 3 K/UL (ref 1.8–8)
NEUTS SEG # BLD: 32.9 K/UL (ref 1.8–8)
NEUTS SEG # BLD: 5.6 K/UL (ref 1.8–8)
NEUTS SEG # BLD: 7.7 K/UL (ref 1.8–8)
NEUTS SEG # BLD: 8.7 K/UL (ref 1.8–8)
NEUTS SEG # BLD: 8.8 K/UL (ref 1.8–8)
NEUTS SEG NFR BLD: 66 % (ref 32–75)
NEUTS SEG NFR BLD: 69 % (ref 32–75)
NEUTS SEG NFR BLD: 72 % (ref 32–75)
NEUTS SEG NFR BLD: 75 % (ref 32–75)
NEUTS SEG NFR BLD: 77 % (ref 32–75)
NEUTS SEG NFR BLD: 77 % (ref 32–75)
NEUTS SEG NFR BLD: 79 % (ref 32–75)
NEUTS SEG NFR BLD: 80 % (ref 32–75)
NEUTS SEG NFR BLD: 83 % (ref 32–75)
NEUTS SEG NFR BLD: 86 % (ref 32–75)
NEUTS SEG NFR BLD: 88 % (ref 32–75)
NEUTS SEG NFR BLD: 89 % (ref 32–75)
NEUTS SEG NFR BLD: 90 % (ref 32–75)
NRBC # BLD: 0 K/UL (ref 0–0.01)
NRBC BLD-RTO: 0 PER 100 WBC
P-R INTERVAL, ECG05: 132 MS
PERFORMED BY, TECHID: ABNORMAL
PHOSPHATE SERPL-MCNC: 2 MG/DL (ref 2.6–4.7)
PHOSPHATE SERPL-MCNC: 3.6 MG/DL (ref 2.6–4.7)
PLATELET # BLD AUTO: 266 K/UL (ref 150–400)
PLATELET # BLD AUTO: 285 K/UL (ref 150–400)
PLATELET # BLD AUTO: 291 K/UL (ref 150–400)
PLATELET # BLD AUTO: 311 K/UL (ref 150–400)
PLATELET # BLD AUTO: 332 K/UL (ref 150–400)
PLATELET # BLD AUTO: 361 K/UL (ref 150–400)
PLATELET # BLD AUTO: 404 K/UL (ref 150–400)
PLATELET # BLD AUTO: 404 K/UL (ref 150–400)
PLATELET # BLD AUTO: 405 K/UL (ref 150–400)
PLATELET # BLD AUTO: 432 K/UL (ref 150–400)
PLATELET # BLD AUTO: 469 K/UL (ref 150–400)
PLATELET # BLD AUTO: 477 K/UL (ref 150–400)
PLATELET # BLD AUTO: 482 K/UL (ref 150–400)
PLATELET # BLD AUTO: 736 K/UL (ref 150–400)
PMV BLD AUTO: 10.2 FL (ref 8.9–12.9)
PMV BLD AUTO: 10.3 FL (ref 8.9–12.9)
PMV BLD AUTO: 10.4 FL (ref 8.9–12.9)
PMV BLD AUTO: 10.5 FL (ref 8.9–12.9)
PMV BLD AUTO: 10.6 FL (ref 8.9–12.9)
PMV BLD AUTO: 10.7 FL (ref 8.9–12.9)
PMV BLD AUTO: 10.9 FL (ref 8.9–12.9)
PMV BLD AUTO: 10.9 FL (ref 8.9–12.9)
PMV BLD AUTO: 11.1 FL (ref 8.9–12.9)
PMV BLD AUTO: 11.2 FL (ref 8.9–12.9)
PMV BLD AUTO: 9.9 FL (ref 8.9–12.9)
POTASSIUM SERPL-SCNC: 3.6 MMOL/L (ref 3.5–5.1)
POTASSIUM SERPL-SCNC: 3.7 MMOL/L (ref 3.5–5.1)
POTASSIUM SERPL-SCNC: 4 MMOL/L (ref 3.5–5.1)
POTASSIUM SERPL-SCNC: 4.1 MMOL/L (ref 3.5–5.1)
POTASSIUM SERPL-SCNC: 4.2 MMOL/L (ref 3.5–5.1)
POTASSIUM SERPL-SCNC: 4.3 MMOL/L (ref 3.5–5.1)
POTASSIUM SERPL-SCNC: 4.4 MMOL/L (ref 3.5–5.1)
POTASSIUM SERPL-SCNC: 4.5 MMOL/L (ref 3.5–5.1)
POTASSIUM SERPL-SCNC: 4.5 MMOL/L (ref 3.5–5.1)
POTASSIUM SERPL-SCNC: 4.6 MMOL/L (ref 3.5–5.1)
POTASSIUM SERPL-SCNC: 4.7 MMOL/L (ref 3.5–5.1)
POTASSIUM SERPL-SCNC: 5 MMOL/L (ref 3.5–5.1)
PROCALCITONIN SERPL-MCNC: 0.11 NG/ML
PROCALCITONIN SERPL-MCNC: 0.16 NG/ML
PROCALCITONIN SERPL-MCNC: 0.27 NG/ML
PROT SERPL-MCNC: 6.2 G/DL (ref 6.4–8.2)
PROT SERPL-MCNC: 6.4 G/DL (ref 6.4–8.2)
PROT SERPL-MCNC: 6.8 G/DL (ref 6.4–8.2)
PROT SERPL-MCNC: 7.5 G/DL (ref 6.4–8.2)
Q-T INTERVAL, ECG07: 302 MS
Q-T INTERVAL, ECG07: 358 MS
QRS DURATION, ECG06: 78 MS
QRS DURATION, ECG06: 82 MS
QTC CALCULATION (BEZET), ECG08: 454 MS
QTC CALCULATION (BEZET), ECG08: 469 MS
RBC # BLD AUTO: 3.39 M/UL (ref 3.8–5.2)
RBC # BLD AUTO: 3.64 M/UL (ref 3.8–5.2)
RBC # BLD AUTO: 3.84 M/UL (ref 3.8–5.2)
RBC # BLD AUTO: 3.85 M/UL (ref 3.8–5.2)
RBC # BLD AUTO: 4.11 M/UL (ref 3.8–5.2)
RBC # BLD AUTO: 4.11 M/UL (ref 3.8–5.2)
RBC # BLD AUTO: 4.18 M/UL (ref 3.8–5.2)
RBC # BLD AUTO: 4.22 M/UL (ref 3.8–5.2)
RBC # BLD AUTO: 4.24 M/UL (ref 3.8–5.2)
RBC # BLD AUTO: 4.33 M/UL (ref 3.8–5.2)
RBC # BLD AUTO: 4.34 M/UL (ref 3.8–5.2)
RBC # BLD AUTO: 4.5 M/UL (ref 3.8–5.2)
RBC # BLD AUTO: 4.69 M/UL (ref 3.8–5.2)
RBC # BLD AUTO: 4.78 M/UL (ref 3.8–5.2)
RBC MORPH BLD: ABNORMAL
SARS-COV-2, COV2: NORMAL
SODIUM SERPL-SCNC: 127 MMOL/L (ref 136–145)
SODIUM SERPL-SCNC: 129 MMOL/L (ref 136–145)
SODIUM SERPL-SCNC: 129 MMOL/L (ref 136–145)
SODIUM SERPL-SCNC: 130 MMOL/L (ref 136–145)
SODIUM SERPL-SCNC: 130 MMOL/L (ref 136–145)
SODIUM SERPL-SCNC: 131 MMOL/L (ref 136–145)
SODIUM SERPL-SCNC: 132 MMOL/L (ref 136–145)
SODIUM SERPL-SCNC: 132 MMOL/L (ref 136–145)
SODIUM SERPL-SCNC: 134 MMOL/L (ref 136–145)
SODIUM SERPL-SCNC: 134 MMOL/L (ref 136–145)
SODIUM SERPL-SCNC: 137 MMOL/L (ref 136–145)
SODIUM SERPL-SCNC: 140 MMOL/L (ref 136–145)
SODIUM SERPL-SCNC: 141 MMOL/L (ref 136–145)
SPECIAL REQUESTS,SREQ: NORMAL
SPECIMEN SOURCE: ABNORMAL
SPECIMEN SOURCE: NORMAL
TROPONIN-HIGH SENSITIVITY: 34 NG/L (ref 0–51)
TROPONIN-HIGH SENSITIVITY: 43 NG/L (ref 0–51)
TROPONIN-HIGH SENSITIVITY: 491 NG/L (ref 0–51)
TROPONIN-HIGH SENSITIVITY: 58 NG/L (ref 0–51)
VENTRICULAR RATE, ECG03: 145 BPM
VENTRICULAR RATE, ECG03: 97 BPM
WBC # BLD AUTO: 11.6 K/UL (ref 3.6–11)
WBC # BLD AUTO: 11.8 K/UL (ref 3.6–11)
WBC # BLD AUTO: 11.9 K/UL (ref 3.6–11)
WBC # BLD AUTO: 12.1 K/UL (ref 3.6–11)
WBC # BLD AUTO: 15.1 K/UL (ref 3.6–11)
WBC # BLD AUTO: 16.7 K/UL (ref 3.6–11)
WBC # BLD AUTO: 17.9 K/UL (ref 3.6–11)
WBC # BLD AUTO: 17.9 K/UL (ref 3.6–11)
WBC # BLD AUTO: 18 K/UL (ref 3.6–11)
WBC # BLD AUTO: 20.6 K/UL (ref 3.6–11)
WBC # BLD AUTO: 36.5 K/UL (ref 3.6–11)
WBC # BLD AUTO: 4.3 K/UL (ref 3.6–11)
WBC # BLD AUTO: 6.7 K/UL (ref 3.6–11)
WBC # BLD AUTO: 8.5 K/UL (ref 3.6–11)

## 2021-01-01 PROCEDURE — 36415 COLL VENOUS BLD VENIPUNCTURE: CPT

## 2021-01-01 PROCEDURE — 82306 VITAMIN D 25 HYDROXY: CPT

## 2021-01-01 PROCEDURE — 74011250637 HC RX REV CODE- 250/637: Performed by: INTERNAL MEDICINE

## 2021-01-01 PROCEDURE — 77010033678 HC OXYGEN DAILY

## 2021-01-01 PROCEDURE — 94640 AIRWAY INHALATION TREATMENT: CPT

## 2021-01-01 PROCEDURE — 85025 COMPLETE CBC W/AUTO DIFF WBC: CPT

## 2021-01-01 PROCEDURE — 74011000258 HC RX REV CODE- 258: Performed by: INTERNAL MEDICINE

## 2021-01-01 PROCEDURE — 71045 X-RAY EXAM CHEST 1 VIEW: CPT

## 2021-01-01 PROCEDURE — 74011250636 HC RX REV CODE- 250/636: Performed by: NURSE PRACTITIONER

## 2021-01-01 PROCEDURE — 65270000029 HC RM PRIVATE

## 2021-01-01 PROCEDURE — 74011250637 HC RX REV CODE- 250/637: Performed by: NURSE PRACTITIONER

## 2021-01-01 PROCEDURE — 74011000250 HC RX REV CODE- 250: Performed by: STUDENT IN AN ORGANIZED HEALTH CARE EDUCATION/TRAINING PROGRAM

## 2021-01-01 PROCEDURE — 94762 N-INVAS EAR/PLS OXIMTRY CONT: CPT

## 2021-01-01 PROCEDURE — 74011000636 HC RX REV CODE- 636: Performed by: EMERGENCY MEDICINE

## 2021-01-01 PROCEDURE — 83880 ASSAY OF NATRIURETIC PEPTIDE: CPT

## 2021-01-01 PROCEDURE — 74011250636 HC RX REV CODE- 250/636: Performed by: HOSPITALIST

## 2021-01-01 PROCEDURE — 74011250637 HC RX REV CODE- 250/637: Performed by: HOSPITALIST

## 2021-01-01 PROCEDURE — 80048 BASIC METABOLIC PNL TOTAL CA: CPT

## 2021-01-01 PROCEDURE — 86140 C-REACTIVE PROTEIN: CPT

## 2021-01-01 PROCEDURE — 94760 N-INVAS EAR/PLS OXIMETRY 1: CPT

## 2021-01-01 PROCEDURE — 74011250636 HC RX REV CODE- 250/636: Performed by: INTERNAL MEDICINE

## 2021-01-01 PROCEDURE — 99285 EMERGENCY DEPT VISIT HI MDM: CPT

## 2021-01-01 PROCEDURE — 97530 THERAPEUTIC ACTIVITIES: CPT

## 2021-01-01 PROCEDURE — 83735 ASSAY OF MAGNESIUM: CPT

## 2021-01-01 PROCEDURE — 74011636637 HC RX REV CODE- 636/637: Performed by: INTERNAL MEDICINE

## 2021-01-01 PROCEDURE — 71275 CT ANGIOGRAPHY CHEST: CPT

## 2021-01-01 PROCEDURE — 85379 FIBRIN DEGRADATION QUANT: CPT

## 2021-01-01 PROCEDURE — 96375 TX/PRO/DX INJ NEW DRUG ADDON: CPT

## 2021-01-01 PROCEDURE — 92526 ORAL FUNCTION THERAPY: CPT

## 2021-01-01 PROCEDURE — 92610 EVALUATE SWALLOWING FUNCTION: CPT

## 2021-01-01 PROCEDURE — 93005 ELECTROCARDIOGRAM TRACING: CPT

## 2021-01-01 PROCEDURE — 97165 OT EVAL LOW COMPLEX 30 MIN: CPT

## 2021-01-01 PROCEDURE — 80053 COMPREHEN METABOLIC PANEL: CPT

## 2021-01-01 PROCEDURE — 74011250637 HC RX REV CODE- 250/637: Performed by: EMERGENCY MEDICINE

## 2021-01-01 PROCEDURE — 80069 RENAL FUNCTION PANEL: CPT

## 2021-01-01 PROCEDURE — 84145 PROCALCITONIN (PCT): CPT

## 2021-01-01 PROCEDURE — 93306 TTE W/DOPPLER COMPLETE: CPT

## 2021-01-01 PROCEDURE — 97110 THERAPEUTIC EXERCISES: CPT

## 2021-01-01 PROCEDURE — 74011000250 HC RX REV CODE- 250: Performed by: INTERNAL MEDICINE

## 2021-01-01 PROCEDURE — 77030005118 HC CATH GASTMY INITL BSC -B: Performed by: INTERNAL MEDICINE

## 2021-01-01 PROCEDURE — 74011250636 HC RX REV CODE- 250/636: Performed by: NURSE ANESTHETIST, CERTIFIED REGISTERED

## 2021-01-01 PROCEDURE — 82962 GLUCOSE BLOOD TEST: CPT

## 2021-01-01 PROCEDURE — 74011000250 HC RX REV CODE- 250: Performed by: NURSE PRACTITIONER

## 2021-01-01 PROCEDURE — XW0DXM6 INTRODUCTION OF BARICITINIB INTO MOUTH AND PHARYNX, EXTERNAL APPROACH, NEW TECHNOLOGY GROUP 6: ICD-10-PCS | Performed by: INTERNAL MEDICINE

## 2021-01-01 PROCEDURE — 74011250636 HC RX REV CODE- 250/636: Performed by: PHYSICIAN ASSISTANT

## 2021-01-01 PROCEDURE — 84484 ASSAY OF TROPONIN QUANT: CPT

## 2021-01-01 PROCEDURE — 97161 PT EVAL LOW COMPLEX 20 MIN: CPT

## 2021-01-01 PROCEDURE — 83520 IMMUNOASSAY QUANT NOS NONAB: CPT

## 2021-01-01 PROCEDURE — 74011250636 HC RX REV CODE- 250/636: Performed by: EMERGENCY MEDICINE

## 2021-01-01 PROCEDURE — 97116 GAIT TRAINING THERAPY: CPT

## 2021-01-01 PROCEDURE — 96365 THER/PROPH/DIAG IV INF INIT: CPT

## 2021-01-01 PROCEDURE — 85027 COMPLETE CBC AUTOMATED: CPT

## 2021-01-01 PROCEDURE — 82728 ASSAY OF FERRITIN: CPT

## 2021-01-01 PROCEDURE — 74230 X-RAY XM SWLNG FUNCJ C+: CPT

## 2021-01-01 PROCEDURE — 76060000034 HC ANESTHESIA 1.5 TO 2 HR: Performed by: INTERNAL MEDICINE

## 2021-01-01 PROCEDURE — 83615 LACTATE (LD) (LDH) ENZYME: CPT

## 2021-01-01 PROCEDURE — 94660 CPAP INITIATION&MGMT: CPT

## 2021-01-01 PROCEDURE — 96372 THER/PROPH/DIAG INJ SC/IM: CPT

## 2021-01-01 PROCEDURE — XW033E5 INTRODUCTION OF REMDESIVIR ANTI-INFECTIVE INTO PERIPHERAL VEIN, PERCUTANEOUS APPROACH, NEW TECHNOLOGY GROUP 5: ICD-10-PCS | Performed by: INTERNAL MEDICINE

## 2021-01-01 PROCEDURE — 96366 THER/PROPH/DIAG IV INF ADDON: CPT

## 2021-01-01 PROCEDURE — 2709999900 HC NON-CHARGEABLE SUPPLY: Performed by: INTERNAL MEDICINE

## 2021-01-01 PROCEDURE — 71271 CT THORAX LUNG CANCER SCR C-: CPT

## 2021-01-01 PROCEDURE — 0DJ08ZZ INSPECTION OF UPPER INTESTINAL TRACT, VIA NATURAL OR ARTIFICIAL OPENING ENDOSCOPIC: ICD-10-PCS | Performed by: INTERNAL MEDICINE

## 2021-01-01 PROCEDURE — 74011000636 HC RX REV CODE- 636: Performed by: STUDENT IN AN ORGANIZED HEALTH CARE EDUCATION/TRAINING PROGRAM

## 2021-01-01 PROCEDURE — 87040 BLOOD CULTURE FOR BACTERIA: CPT

## 2021-01-01 PROCEDURE — 87635 SARS-COV-2 COVID-19 AMP PRB: CPT

## 2021-01-01 PROCEDURE — 74011000250 HC RX REV CODE- 250: Performed by: EMERGENCY MEDICINE

## 2021-01-01 PROCEDURE — 74011000250 HC RX REV CODE- 250: Performed by: NURSE ANESTHETIST, CERTIFIED REGISTERED

## 2021-01-01 PROCEDURE — 83605 ASSAY OF LACTIC ACID: CPT

## 2021-01-01 PROCEDURE — 92611 MOTION FLUOROSCOPY/SWALLOW: CPT

## 2021-01-01 PROCEDURE — 76040000009: Performed by: INTERNAL MEDICINE

## 2021-01-01 PROCEDURE — 94667 MNPJ CHEST WALL 1ST: CPT

## 2021-01-01 PROCEDURE — 74018 RADEX ABDOMEN 1 VIEW: CPT

## 2021-01-01 RX ORDER — AMLODIPINE BESYLATE 5 MG/1
5 TABLET ORAL DAILY
Status: DISCONTINUED | OUTPATIENT
Start: 2021-01-01 | End: 2021-01-01 | Stop reason: HOSPADM

## 2021-01-01 RX ORDER — ASPIRIN 325 MG/1
100 TABLET, FILM COATED ORAL DAILY
Status: DISCONTINUED | OUTPATIENT
Start: 2021-01-01 | End: 2021-01-01 | Stop reason: SDUPTHER

## 2021-01-01 RX ORDER — ONDANSETRON 2 MG/ML
4 INJECTION INTRAMUSCULAR; INTRAVENOUS
Status: DISCONTINUED | OUTPATIENT
Start: 2021-01-01 | End: 2021-01-01 | Stop reason: HOSPADM

## 2021-01-01 RX ORDER — METOPROLOL TARTRATE 50 MG/1
50 TABLET ORAL 2 TIMES DAILY
Status: DISCONTINUED | OUTPATIENT
Start: 2021-01-01 | End: 2021-01-01 | Stop reason: HOSPADM

## 2021-01-01 RX ORDER — ACETAMINOPHEN 325 MG/1
650 TABLET ORAL
Status: DISCONTINUED | OUTPATIENT
Start: 2021-01-01 | End: 2021-01-01 | Stop reason: HOSPADM

## 2021-01-01 RX ORDER — FAMOTIDINE 20 MG/1
20 TABLET, FILM COATED ORAL 2 TIMES DAILY
Status: DISCONTINUED | OUTPATIENT
Start: 2021-01-01 | End: 2021-01-01 | Stop reason: HOSPADM

## 2021-01-01 RX ORDER — CODEINE PHOSPHATE AND GUAIFENESIN 10; 100 MG/5ML; MG/5ML
5 SOLUTION ORAL
Status: DISCONTINUED | OUTPATIENT
Start: 2021-01-01 | End: 2021-01-01 | Stop reason: HOSPADM

## 2021-01-01 RX ORDER — ONDANSETRON 4 MG/1
4 TABLET, ORALLY DISINTEGRATING ORAL
Status: DISCONTINUED | OUTPATIENT
Start: 2021-01-01 | End: 2021-01-01 | Stop reason: HOSPADM

## 2021-01-01 RX ORDER — FUROSEMIDE 10 MG/ML
20 INJECTION INTRAMUSCULAR; INTRAVENOUS ONCE
Status: COMPLETED | OUTPATIENT
Start: 2021-01-01 | End: 2021-01-01

## 2021-01-01 RX ORDER — METOPROLOL TARTRATE 5 MG/5ML
2.5 INJECTION INTRAVENOUS ONCE
Status: COMPLETED | OUTPATIENT
Start: 2021-01-01 | End: 2021-01-01

## 2021-01-01 RX ORDER — ENOXAPARIN SODIUM 100 MG/ML
1 INJECTION SUBCUTANEOUS DAILY
Status: DISCONTINUED | OUTPATIENT
Start: 2021-01-01 | End: 2021-01-01

## 2021-01-01 RX ORDER — SODIUM CHLORIDE 0.9 % (FLUSH) 0.9 %
5-40 SYRINGE (ML) INJECTION EVERY 8 HOURS
Status: DISCONTINUED | OUTPATIENT
Start: 2021-01-01 | End: 2021-01-01 | Stop reason: HOSPADM

## 2021-01-01 RX ORDER — ENOXAPARIN SODIUM 100 MG/ML
40 INJECTION SUBCUTANEOUS EVERY 24 HOURS
Status: DISCONTINUED | OUTPATIENT
Start: 2021-01-01 | End: 2021-01-01 | Stop reason: HOSPADM

## 2021-01-01 RX ORDER — ACETAMINOPHEN 650 MG/1
650 SUPPOSITORY RECTAL
Status: DISCONTINUED | OUTPATIENT
Start: 2021-01-01 | End: 2021-01-01 | Stop reason: HOSPADM

## 2021-01-01 RX ORDER — MORPHINE SULFATE 2 MG/ML
2 INJECTION, SOLUTION INTRAMUSCULAR; INTRAVENOUS
Status: DISCONTINUED | OUTPATIENT
Start: 2021-01-01 | End: 2021-01-01 | Stop reason: HOSPADM

## 2021-01-01 RX ORDER — ALBUTEROL SULFATE 90 UG/1
2 AEROSOL, METERED RESPIRATORY (INHALATION)
Status: DISCONTINUED | OUTPATIENT
Start: 2021-01-01 | End: 2021-01-01

## 2021-01-01 RX ORDER — DILTIAZEM HYDROCHLORIDE 120 MG/1
120 CAPSULE, COATED, EXTENDED RELEASE ORAL DAILY
Status: DISCONTINUED | OUTPATIENT
Start: 2021-01-01 | End: 2021-01-01 | Stop reason: HOSPADM

## 2021-01-01 RX ORDER — ALBUTEROL SULFATE 90 UG/1
2 AEROSOL, METERED RESPIRATORY (INHALATION)
Status: DISCONTINUED | OUTPATIENT
Start: 2021-01-01 | End: 2021-01-01 | Stop reason: HOSPADM

## 2021-01-01 RX ORDER — DEXAMETHASONE SODIUM PHOSPHATE 10 MG/ML
6 INJECTION INTRAMUSCULAR; INTRAVENOUS EVERY 12 HOURS
Status: DISCONTINUED | OUTPATIENT
Start: 2021-01-01 | End: 2021-01-01

## 2021-01-01 RX ORDER — SODIUM CHLORIDE 9 MG/ML
75 INJECTION, SOLUTION INTRAVENOUS CONTINUOUS
Status: DISCONTINUED | OUTPATIENT
Start: 2021-01-01 | End: 2021-01-01

## 2021-01-01 RX ORDER — METOPROLOL TARTRATE 5 MG/5ML
5 INJECTION INTRAVENOUS
Status: DISCONTINUED | OUTPATIENT
Start: 2021-01-01 | End: 2021-01-01

## 2021-01-01 RX ORDER — DEXTROMETHORPHAN POLISTIREX 30 MG/5ML
30 SUSPENSION ORAL EVERY 12 HOURS
Status: DISCONTINUED | OUTPATIENT
Start: 2021-01-01 | End: 2021-01-01 | Stop reason: HOSPADM

## 2021-01-01 RX ORDER — FAMOTIDINE 20 MG/1
20 TABLET, FILM COATED ORAL 2 TIMES DAILY
Status: DISCONTINUED | OUTPATIENT
Start: 2021-01-01 | End: 2021-01-01 | Stop reason: SDUPTHER

## 2021-01-01 RX ORDER — GUAIFENESIN 600 MG/1
1200 TABLET, EXTENDED RELEASE ORAL EVERY 12 HOURS
Status: DISCONTINUED | OUTPATIENT
Start: 2021-01-01 | End: 2021-01-01 | Stop reason: HOSPADM

## 2021-01-01 RX ORDER — SODIUM CHLORIDE, SODIUM LACTATE, POTASSIUM CHLORIDE, CALCIUM CHLORIDE 600; 310; 30; 20 MG/100ML; MG/100ML; MG/100ML; MG/100ML
INJECTION, SOLUTION INTRAVENOUS
Status: DISCONTINUED | OUTPATIENT
Start: 2021-01-01 | End: 2021-01-01 | Stop reason: HOSPADM

## 2021-01-01 RX ORDER — POLYETHYLENE GLYCOL 3350 17 G/17G
17 POWDER, FOR SOLUTION ORAL DAILY PRN
Status: DISCONTINUED | OUTPATIENT
Start: 2021-01-01 | End: 2021-01-01 | Stop reason: HOSPADM

## 2021-01-01 RX ORDER — FAMOTIDINE 40 MG/1
40 TABLET, FILM COATED ORAL DAILY
Qty: 30 TABLET | Refills: 0 | Status: SHIPPED | OUTPATIENT
Start: 2021-01-01 | End: 2021-01-01

## 2021-01-01 RX ORDER — AZITHROMYCIN 500 MG/1
500 TABLET, FILM COATED ORAL DAILY
Qty: 4 TABLET | Refills: 0 | Status: SHIPPED | OUTPATIENT
Start: 2021-01-01 | End: 2021-01-01

## 2021-01-01 RX ORDER — ENOXAPARIN SODIUM 100 MG/ML
1 INJECTION SUBCUTANEOUS EVERY 12 HOURS
Status: DISCONTINUED | OUTPATIENT
Start: 2021-01-01 | End: 2021-01-01

## 2021-01-01 RX ORDER — PROPOFOL 10 MG/ML
INJECTION, EMULSION INTRAVENOUS AS NEEDED
Status: DISCONTINUED | OUTPATIENT
Start: 2021-01-01 | End: 2021-01-01 | Stop reason: HOSPADM

## 2021-01-01 RX ORDER — DEXAMETHASONE SODIUM PHOSPHATE 10 MG/ML
6 INJECTION INTRAMUSCULAR; INTRAVENOUS EVERY 24 HOURS
Status: DISCONTINUED | OUTPATIENT
Start: 2021-01-01 | End: 2021-01-01

## 2021-01-01 RX ORDER — PREDNISONE 20 MG/1
20 TABLET ORAL DAILY
Status: DISCONTINUED | OUTPATIENT
Start: 2021-01-01 | End: 2021-01-01 | Stop reason: HOSPADM

## 2021-01-01 RX ORDER — MORPHINE SULFATE 20 MG/ML
10 SOLUTION ORAL
Qty: 30 ML | Refills: 0 | Status: SHIPPED | OUTPATIENT
Start: 2021-01-01 | End: 2021-12-25

## 2021-01-01 RX ORDER — ALPRAZOLAM 0.25 MG/1
0.5 TABLET ORAL ONCE
Status: COMPLETED | OUTPATIENT
Start: 2021-01-01 | End: 2021-01-01

## 2021-01-01 RX ORDER — GLYCOPYRROLATE 0.2 MG/ML
INJECTION INTRAMUSCULAR; INTRAVENOUS AS NEEDED
Status: DISCONTINUED | OUTPATIENT
Start: 2021-01-01 | End: 2021-01-01 | Stop reason: HOSPADM

## 2021-01-01 RX ORDER — ENOXAPARIN SODIUM 100 MG/ML
40 INJECTION SUBCUTANEOUS DAILY
Status: DISCONTINUED | OUTPATIENT
Start: 2021-01-01 | End: 2021-01-01

## 2021-01-01 RX ORDER — DOCUSATE SODIUM 50 MG/5ML
100 LIQUID ORAL 2 TIMES DAILY
Status: DISCONTINUED | OUTPATIENT
Start: 2021-01-01 | End: 2021-01-01 | Stop reason: HOSPADM

## 2021-01-01 RX ORDER — BENZONATATE 200 MG/1
200 CAPSULE ORAL 3 TIMES DAILY
Qty: 21 CAPSULE | Refills: 0 | Status: SHIPPED | OUTPATIENT
Start: 2021-01-01 | End: 2021-01-01

## 2021-01-01 RX ORDER — GUAIFENESIN 600 MG/1
600 TABLET, EXTENDED RELEASE ORAL EVERY 12 HOURS
Status: DISCONTINUED | OUTPATIENT
Start: 2021-01-01 | End: 2021-01-01

## 2021-01-01 RX ORDER — DILTIAZEM HCL/D5W 125 MG/125
2-10 PLASTIC BAG, INJECTION (ML) INTRAVENOUS
Status: DISCONTINUED | OUTPATIENT
Start: 2021-01-01 | End: 2021-01-01

## 2021-01-01 RX ORDER — GUAIFENESIN 600 MG/1
600 TABLET, EXTENDED RELEASE ORAL 2 TIMES DAILY
Status: DISCONTINUED | OUTPATIENT
Start: 2021-01-01 | End: 2021-01-01 | Stop reason: HOSPADM

## 2021-01-01 RX ORDER — PRAVASTATIN SODIUM 10 MG/1
10 TABLET ORAL EVERY EVENING
Status: DISCONTINUED | OUTPATIENT
Start: 2021-01-01 | End: 2021-01-01 | Stop reason: HOSPADM

## 2021-01-01 RX ORDER — METOPROLOL TARTRATE 5 MG/5ML
5 INJECTION INTRAVENOUS EVERY 6 HOURS
Status: DISCONTINUED | OUTPATIENT
Start: 2021-01-01 | End: 2021-01-01

## 2021-01-01 RX ORDER — LORAZEPAM 2 MG/ML
1 CONCENTRATE ORAL
Qty: 30 ML | Refills: 0 | Status: SHIPPED | OUTPATIENT
Start: 2021-01-01

## 2021-01-01 RX ORDER — NICOTINE 7MG/24HR
1 PATCH, TRANSDERMAL 24 HOURS TRANSDERMAL DAILY
Status: DISCONTINUED | OUTPATIENT
Start: 2021-01-01 | End: 2021-01-01 | Stop reason: HOSPADM

## 2021-01-01 RX ORDER — CEFAZOLIN SODIUM 1 G/3ML
INJECTION, POWDER, FOR SOLUTION INTRAMUSCULAR; INTRAVENOUS AS NEEDED
Status: DISCONTINUED | OUTPATIENT
Start: 2021-01-01 | End: 2021-01-01 | Stop reason: HOSPADM

## 2021-01-01 RX ORDER — DILTIAZEM HYDROCHLORIDE 5 MG/ML
0.25 INJECTION INTRAVENOUS
Status: COMPLETED | OUTPATIENT
Start: 2021-01-01 | End: 2021-01-01

## 2021-01-01 RX ORDER — BENZONATATE 100 MG/1
200 CAPSULE ORAL 3 TIMES DAILY
Status: DISCONTINUED | OUTPATIENT
Start: 2021-01-01 | End: 2021-01-01 | Stop reason: HOSPADM

## 2021-01-01 RX ORDER — MELATONIN
2000 DAILY
Status: DISCONTINUED | OUTPATIENT
Start: 2021-01-01 | End: 2021-01-01 | Stop reason: HOSPADM

## 2021-01-01 RX ORDER — BUDESONIDE AND FORMOTEROL FUMARATE DIHYDRATE 160; 4.5 UG/1; UG/1
2 AEROSOL RESPIRATORY (INHALATION)
Status: DISCONTINUED | OUTPATIENT
Start: 2021-01-01 | End: 2021-01-01 | Stop reason: HOSPADM

## 2021-01-01 RX ORDER — SODIUM CHLORIDE 9 MG/ML
100 INJECTION, SOLUTION INTRAVENOUS CONTINUOUS
Status: DISPENSED | OUTPATIENT
Start: 2021-01-01 | End: 2021-01-01

## 2021-01-01 RX ORDER — BISACODYL 5 MG
5 TABLET, DELAYED RELEASE (ENTERIC COATED) ORAL DAILY PRN
Status: DISCONTINUED | OUTPATIENT
Start: 2021-01-01 | End: 2021-01-01 | Stop reason: HOSPADM

## 2021-01-01 RX ORDER — ALBUTEROL SULFATE 2.5 MG/.5ML
2.5 SOLUTION RESPIRATORY (INHALATION) ONCE
Status: COMPLETED | OUTPATIENT
Start: 2021-01-01 | End: 2021-01-01

## 2021-01-01 RX ORDER — LISINOPRIL 5 MG/1
5 TABLET ORAL DAILY
Status: DISCONTINUED | OUTPATIENT
Start: 2021-01-01 | End: 2021-01-01 | Stop reason: HOSPADM

## 2021-01-01 RX ORDER — ERGOCALCIFEROL 1.25 MG/1
50000 CAPSULE ORAL
Status: DISCONTINUED | OUTPATIENT
Start: 2021-01-01 | End: 2021-01-01 | Stop reason: HOSPADM

## 2021-01-01 RX ORDER — AMLODIPINE BESYLATE 5 MG/1
5 TABLET ORAL DAILY
Status: DISCONTINUED | OUTPATIENT
Start: 2021-01-01 | End: 2021-01-01

## 2021-01-01 RX ORDER — DILTIAZEM HCL/D5W 125 MG/125
5 PLASTIC BAG, INJECTION (ML) INTRAVENOUS
Status: DISCONTINUED | OUTPATIENT
Start: 2021-01-01 | End: 2021-01-01

## 2021-01-01 RX ORDER — PREDNISONE 20 MG/1
20 TABLET ORAL 2 TIMES DAILY WITH MEALS
Status: DISCONTINUED | OUTPATIENT
Start: 2021-01-01 | End: 2021-01-01

## 2021-01-01 RX ORDER — FUROSEMIDE 10 MG/ML
40 INJECTION INTRAMUSCULAR; INTRAVENOUS ONCE
Status: COMPLETED | OUTPATIENT
Start: 2021-01-01 | End: 2021-01-01

## 2021-01-01 RX ORDER — SODIUM CHLORIDE 0.9 % (FLUSH) 0.9 %
5-40 SYRINGE (ML) INJECTION AS NEEDED
Status: DISCONTINUED | OUTPATIENT
Start: 2021-01-01 | End: 2021-01-01 | Stop reason: HOSPADM

## 2021-01-01 RX ORDER — AZITHROMYCIN 500 MG/1
500 TABLET, FILM COATED ORAL DAILY
Status: DISCONTINUED | OUTPATIENT
Start: 2021-01-01 | End: 2021-01-01 | Stop reason: HOSPADM

## 2021-01-01 RX ORDER — ALPRAZOLAM 0.25 MG/1
0.5 TABLET ORAL
Status: DISCONTINUED | OUTPATIENT
Start: 2021-01-01 | End: 2021-01-01

## 2021-01-01 RX ORDER — ALBUTEROL SULFATE 90 UG/1
2 AEROSOL, METERED RESPIRATORY (INHALATION)
Qty: 1 EACH | Refills: 1 | Status: SHIPPED | OUTPATIENT
Start: 2021-01-01 | End: 2021-01-01

## 2021-01-01 RX ORDER — GUAIFENESIN 600 MG/1
600 TABLET, EXTENDED RELEASE ORAL ONCE
Status: COMPLETED | OUTPATIENT
Start: 2021-01-01 | End: 2021-01-01

## 2021-01-01 RX ORDER — GUAIFENESIN 100 MG/5ML
81 LIQUID (ML) ORAL DAILY
Status: DISCONTINUED | OUTPATIENT
Start: 2021-01-01 | End: 2021-01-01 | Stop reason: HOSPADM

## 2021-01-01 RX ORDER — GUAIFENESIN 100 MG/5ML
400 SOLUTION ORAL EVERY 6 HOURS
Qty: 400 ML | Refills: 0 | Status: SHIPPED | OUTPATIENT
Start: 2021-01-01 | End: 2021-01-01

## 2021-01-01 RX ORDER — MULTIVITAMIN
1 TABLET ORAL DAILY
Status: DISCONTINUED | OUTPATIENT
Start: 2021-01-01 | End: 2021-01-01 | Stop reason: HOSPADM

## 2021-01-01 RX ORDER — GUAIFENESIN 100 MG/5ML
400 SOLUTION ORAL EVERY 6 HOURS
Status: DISCONTINUED | OUTPATIENT
Start: 2021-01-01 | End: 2021-01-01 | Stop reason: HOSPADM

## 2021-01-01 RX ORDER — FACIAL-BODY WIPES
10 EACH TOPICAL DAILY PRN
Status: DISCONTINUED | OUTPATIENT
Start: 2021-01-01 | End: 2021-01-01 | Stop reason: HOSPADM

## 2021-01-01 RX ORDER — ALPRAZOLAM 0.25 MG/1
1 TABLET ORAL
Status: DISCONTINUED | OUTPATIENT
Start: 2021-01-01 | End: 2021-01-01 | Stop reason: HOSPADM

## 2021-01-01 RX ORDER — THIAMINE HYDROCHLORIDE 100 MG/ML
100 INJECTION, SOLUTION INTRAMUSCULAR; INTRAVENOUS DAILY
Status: DISCONTINUED | OUTPATIENT
Start: 2021-01-01 | End: 2021-01-01 | Stop reason: HOSPADM

## 2021-01-01 RX ORDER — SODIUM,POTASSIUM PHOSPHATES 280-250MG
2 POWDER IN PACKET (EA) ORAL EVERY 4 HOURS
Status: COMPLETED | OUTPATIENT
Start: 2021-01-01 | End: 2021-01-01

## 2021-01-01 RX ORDER — PREDNISONE 20 MG/1
20 TABLET ORAL DAILY
Qty: 14 TABLET | Refills: 0 | Status: SHIPPED | OUTPATIENT
Start: 2021-01-01 | End: 2021-01-01

## 2021-01-01 RX ORDER — LORAZEPAM 2 MG/ML
2 INJECTION INTRAMUSCULAR
Status: DISCONTINUED | OUTPATIENT
Start: 2021-01-01 | End: 2021-01-01 | Stop reason: HOSPADM

## 2021-01-01 RX ORDER — HYDROXYZINE PAMOATE 25 MG/1
25 CAPSULE ORAL
Status: DISCONTINUED | OUTPATIENT
Start: 2021-01-01 | End: 2021-01-01 | Stop reason: HOSPADM

## 2021-01-01 RX ORDER — HYDROXYZINE HYDROCHLORIDE 25 MG/ML
25 INJECTION, SOLUTION INTRAMUSCULAR ONCE
Status: COMPLETED | OUTPATIENT
Start: 2021-01-01 | End: 2021-01-01

## 2021-01-01 RX ORDER — ASCORBIC ACID 500 MG
500 TABLET ORAL 2 TIMES DAILY
Status: DISCONTINUED | OUTPATIENT
Start: 2021-01-01 | End: 2021-01-01 | Stop reason: HOSPADM

## 2021-01-01 RX ORDER — METOPROLOL TARTRATE 50 MG/1
50 TABLET ORAL 2 TIMES DAILY
Status: DISCONTINUED | OUTPATIENT
Start: 2021-01-01 | End: 2021-01-01 | Stop reason: SDUPTHER

## 2021-01-01 RX ORDER — MULTIVITAMIN
1 TABLET ORAL DAILY
Qty: 30 TABLET | Refills: 0 | Status: SHIPPED | OUTPATIENT
Start: 2021-01-01 | End: 2021-01-01

## 2021-01-01 RX ORDER — DEXAMETHASONE SODIUM PHOSPHATE 10 MG/ML
4 INJECTION INTRAMUSCULAR; INTRAVENOUS EVERY 12 HOURS
Status: DISCONTINUED | OUTPATIENT
Start: 2021-01-01 | End: 2021-01-01 | Stop reason: HOSPADM

## 2021-01-01 RX ORDER — IPRATROPIUM BROMIDE AND ALBUTEROL SULFATE 2.5; .5 MG/3ML; MG/3ML
3 SOLUTION RESPIRATORY (INHALATION)
Status: COMPLETED | OUTPATIENT
Start: 2021-01-01 | End: 2021-01-01

## 2021-01-01 RX ORDER — BUDESONIDE AND FORMOTEROL FUMARATE DIHYDRATE 160; 4.5 UG/1; UG/1
2 AEROSOL RESPIRATORY (INHALATION) 2 TIMES DAILY
Qty: 10.2 G | Refills: 0 | Status: SHIPPED | OUTPATIENT
Start: 2021-01-01 | End: 2021-01-01

## 2021-01-01 RX ORDER — LIDOCAINE HYDROCHLORIDE 20 MG/ML
INJECTION, SOLUTION EPIDURAL; INFILTRATION; INTRACAUDAL; PERINEURAL AS NEEDED
Status: DISCONTINUED | OUTPATIENT
Start: 2021-01-01 | End: 2021-01-01 | Stop reason: HOSPADM

## 2021-01-01 RX ORDER — AZITHROMYCIN 500 MG/1
500 TABLET, FILM COATED ORAL DAILY
Status: DISCONTINUED | OUTPATIENT
Start: 2021-01-01 | End: 2021-01-01

## 2021-01-01 RX ADMIN — LISINOPRIL 5 MG: 5 TABLET ORAL at 11:05

## 2021-01-01 RX ADMIN — PIPERACILLIN SODIUM AND TAZOBACTAM SODIUM 3.38 G: 3; .375 INJECTION, POWDER, LYOPHILIZED, FOR SOLUTION INTRAVENOUS at 13:20

## 2021-01-01 RX ADMIN — ACETAMINOPHEN 650 MG: 325 TABLET ORAL at 22:29

## 2021-01-01 RX ADMIN — METOPROLOL TARTRATE 5 MG: 1 INJECTION, SOLUTION INTRAVENOUS at 16:32

## 2021-01-01 RX ADMIN — SODIUM CHLORIDE, POTASSIUM CHLORIDE, SODIUM LACTATE AND CALCIUM CHLORIDE: 600; 310; 30; 20 INJECTION, SOLUTION INTRAVENOUS at 15:16

## 2021-01-01 RX ADMIN — GUAIFENESIN 600 MG: 600 TABLET, EXTENDED RELEASE ORAL at 22:36

## 2021-01-01 RX ADMIN — ENOXAPARIN SODIUM 40 MG: 40 INJECTION SUBCUTANEOUS at 11:23

## 2021-01-01 RX ADMIN — ACETAMINOPHEN 650 MG: 325 TABLET ORAL at 02:49

## 2021-01-01 RX ADMIN — Medication 10 ML: at 21:58

## 2021-01-01 RX ADMIN — BUDESONIDE AND FORMOTEROL FUMARATE DIHYDRATE 2 PUFF: 160; 4.5 AEROSOL RESPIRATORY (INHALATION) at 07:34

## 2021-01-01 RX ADMIN — DEXAMETHASONE SODIUM PHOSPHATE 6 MG: 10 INJECTION INTRAMUSCULAR; INTRAVENOUS at 11:27

## 2021-01-01 RX ADMIN — OXYCODONE HYDROCHLORIDE AND ACETAMINOPHEN 500 MG: 500 TABLET ORAL at 20:01

## 2021-01-01 RX ADMIN — PIPERACILLIN SODIUM AND TAZOBACTAM SODIUM 3.38 G: 3; .375 INJECTION, POWDER, LYOPHILIZED, FOR SOLUTION INTRAVENOUS at 15:28

## 2021-01-01 RX ADMIN — METOPROLOL TARTRATE 50 MG: 50 TABLET, FILM COATED ORAL at 11:00

## 2021-01-01 RX ADMIN — GUAIFENESIN 400 MG: 200 SOLUTION ORAL at 10:58

## 2021-01-01 RX ADMIN — ALBUTEROL SULFATE 2 PUFF: 108 INHALANT RESPIRATORY (INHALATION) at 01:53

## 2021-01-01 RX ADMIN — ALPRAZOLAM 0.5 MG: 0.25 TABLET ORAL at 13:19

## 2021-01-01 RX ADMIN — BUDESONIDE AND FORMOTEROL FUMARATE DIHYDRATE 2 PUFF: 160; 4.5 AEROSOL RESPIRATORY (INHALATION) at 19:20

## 2021-01-01 RX ADMIN — FAMOTIDINE 20 MG: 10 INJECTION, SOLUTION INTRAVENOUS at 08:53

## 2021-01-01 RX ADMIN — BUDESONIDE AND FORMOTEROL FUMARATE DIHYDRATE 2 PUFF: 160; 4.5 AEROSOL RESPIRATORY (INHALATION) at 08:01

## 2021-01-01 RX ADMIN — Medication 30 MG: at 20:55

## 2021-01-01 RX ADMIN — GUAIFENESIN AND CODEINE PHOSPHATE 5 ML: 10; 100 LIQUID ORAL at 03:12

## 2021-01-01 RX ADMIN — FAMOTIDINE 20 MG: 20 TABLET ORAL at 21:00

## 2021-01-01 RX ADMIN — DEXAMETHASONE SODIUM PHOSPHATE 6 MG: 10 INJECTION INTRAMUSCULAR; INTRAVENOUS at 08:44

## 2021-01-01 RX ADMIN — ACETAMINOPHEN 650 MG: 325 TABLET ORAL at 17:07

## 2021-01-01 RX ADMIN — GUAIFENESIN 600 MG: 600 TABLET, EXTENDED RELEASE ORAL at 20:35

## 2021-01-01 RX ADMIN — Medication 30 MG: at 21:00

## 2021-01-01 RX ADMIN — PIPERACILLIN SODIUM AND TAZOBACTAM SODIUM 3.38 G: 3; .375 INJECTION, POWDER, LYOPHILIZED, FOR SOLUTION INTRAVENOUS at 05:09

## 2021-01-01 RX ADMIN — ENOXAPARIN SODIUM 40 MG: 100 INJECTION SUBCUTANEOUS at 12:02

## 2021-01-01 RX ADMIN — Medication 2000 UNITS: at 10:08

## 2021-01-01 RX ADMIN — BUDESONIDE AND FORMOTEROL FUMARATE DIHYDRATE 2 PUFF: 160; 4.5 AEROSOL RESPIRATORY (INHALATION) at 08:16

## 2021-01-01 RX ADMIN — DILTIAZEM HYDROCHLORIDE 120 MG: 120 CAPSULE, COATED, EXTENDED RELEASE ORAL at 12:00

## 2021-01-01 RX ADMIN — GUAIFENESIN AND CODEINE PHOSPHATE 5 ML: 10; 100 LIQUID ORAL at 04:16

## 2021-01-01 RX ADMIN — Medication 10 ML: at 15:05

## 2021-01-01 RX ADMIN — PIPERACILLIN SODIUM AND TAZOBACTAM SODIUM 3.38 G: 3; .375 INJECTION, POWDER, LYOPHILIZED, FOR SOLUTION INTRAVENOUS at 14:30

## 2021-01-01 RX ADMIN — DEXAMETHASONE SODIUM PHOSPHATE 4 MG: 10 INJECTION INTRAMUSCULAR; INTRAVENOUS at 20:36

## 2021-01-01 RX ADMIN — Medication 10 ML: at 05:07

## 2021-01-01 RX ADMIN — DILTIAZEM HYDROCHLORIDE 120 MG: 120 CAPSULE, COATED, EXTENDED RELEASE ORAL at 08:29

## 2021-01-01 RX ADMIN — PIPERACILLIN SODIUM AND TAZOBACTAM SODIUM 3.38 G: 3; .375 INJECTION, POWDER, LYOPHILIZED, FOR SOLUTION INTRAVENOUS at 05:20

## 2021-01-01 RX ADMIN — GUAIFENESIN 400 MG: 200 SOLUTION ORAL at 05:05

## 2021-01-01 RX ADMIN — PIPERACILLIN SODIUM AND TAZOBACTAM SODIUM 3.38 G: 3; .375 INJECTION, POWDER, LYOPHILIZED, FOR SOLUTION INTRAVENOUS at 05:45

## 2021-01-01 RX ADMIN — ACETAMINOPHEN 650 MG: 325 TABLET ORAL at 09:51

## 2021-01-01 RX ADMIN — METOPROLOL TARTRATE 50 MG: 50 TABLET, FILM COATED ORAL at 16:58

## 2021-01-01 RX ADMIN — OXYCODONE HYDROCHLORIDE AND ACETAMINOPHEN 500 MG: 500 TABLET ORAL at 20:51

## 2021-01-01 RX ADMIN — DILTIAZEM HYDROCHLORIDE 9.5 MG: 5 INJECTION INTRAVENOUS at 05:00

## 2021-01-01 RX ADMIN — ACETAMINOPHEN 650 MG: 325 TABLET ORAL at 02:58

## 2021-01-01 RX ADMIN — ENOXAPARIN SODIUM 40 MG: 100 INJECTION SUBCUTANEOUS at 10:08

## 2021-01-01 RX ADMIN — DEXAMETHASONE SODIUM PHOSPHATE 6 MG: 10 INJECTION INTRAMUSCULAR; INTRAVENOUS at 09:30

## 2021-01-01 RX ADMIN — ALBUTEROL SULFATE 2 PUFF: 108 INHALANT RESPIRATORY (INHALATION) at 08:06

## 2021-01-01 RX ADMIN — DILTIAZEM HYDROCHLORIDE 120 MG: 120 CAPSULE, COATED, EXTENDED RELEASE ORAL at 09:32

## 2021-01-01 RX ADMIN — Medication 30 MG: at 23:18

## 2021-01-01 RX ADMIN — Medication 30 MG: at 12:02

## 2021-01-01 RX ADMIN — ALBUTEROL SULFATE 2 PUFF: 108 INHALANT RESPIRATORY (INHALATION) at 21:15

## 2021-01-01 RX ADMIN — OXYCODONE HYDROCHLORIDE AND ACETAMINOPHEN 500 MG: 500 TABLET ORAL at 09:26

## 2021-01-01 RX ADMIN — METHYLPREDNISOLONE SODIUM SUCCINATE 40 MG: 40 INJECTION, POWDER, FOR SOLUTION INTRAMUSCULAR; INTRAVENOUS at 22:32

## 2021-01-01 RX ADMIN — BUDESONIDE AND FORMOTEROL FUMARATE DIHYDRATE 2 PUFF: 160; 4.5 AEROSOL RESPIRATORY (INHALATION) at 07:48

## 2021-01-01 RX ADMIN — PIPERACILLIN SODIUM AND TAZOBACTAM SODIUM 3.38 G: 3; .375 INJECTION, POWDER, LYOPHILIZED, FOR SOLUTION INTRAVENOUS at 23:09

## 2021-01-01 RX ADMIN — ALBUTEROL SULFATE 2 PUFF: 108 INHALANT RESPIRATORY (INHALATION) at 14:00

## 2021-01-01 RX ADMIN — Medication 10 ML: at 00:36

## 2021-01-01 RX ADMIN — FAMOTIDINE 20 MG: 20 TABLET ORAL at 20:51

## 2021-01-01 RX ADMIN — Medication 10 ML: at 15:03

## 2021-01-01 RX ADMIN — ALPRAZOLAM 0.5 MG: 0.25 TABLET ORAL at 00:49

## 2021-01-01 RX ADMIN — GUAIFENESIN 600 MG: 600 TABLET, EXTENDED RELEASE ORAL at 11:56

## 2021-01-01 RX ADMIN — METOPROLOL TARTRATE 50 MG: 50 TABLET, FILM COATED ORAL at 22:23

## 2021-01-01 RX ADMIN — BUDESONIDE AND FORMOTEROL FUMARATE DIHYDRATE 2 PUFF: 160; 4.5 AEROSOL RESPIRATORY (INHALATION) at 19:26

## 2021-01-01 RX ADMIN — FAMOTIDINE 20 MG: 10 INJECTION, SOLUTION INTRAVENOUS at 11:21

## 2021-01-01 RX ADMIN — ACETAMINOPHEN 650 MG: 325 TABLET ORAL at 17:31

## 2021-01-01 RX ADMIN — METOPROLOL TARTRATE 50 MG: 50 TABLET, FILM COATED ORAL at 22:41

## 2021-01-01 RX ADMIN — GUAIFENESIN 400 MG: 200 SOLUTION ORAL at 00:32

## 2021-01-01 RX ADMIN — ALBUTEROL SULFATE 2 PUFF: 108 INHALANT RESPIRATORY (INHALATION) at 13:37

## 2021-01-01 RX ADMIN — IOPAMIDOL 80 ML: 755 INJECTION, SOLUTION INTRAVENOUS at 03:43

## 2021-01-01 RX ADMIN — ACETAMINOPHEN 650 MG: 325 TABLET ORAL at 07:00

## 2021-01-01 RX ADMIN — ALBUTEROL SULFATE 2 PUFF: 108 INHALANT RESPIRATORY (INHALATION) at 07:57

## 2021-01-01 RX ADMIN — LISINOPRIL 5 MG: 5 TABLET ORAL at 11:18

## 2021-01-01 RX ADMIN — BUDESONIDE AND FORMOTEROL FUMARATE DIHYDRATE 2 PUFF: 160; 4.5 AEROSOL RESPIRATORY (INHALATION) at 07:57

## 2021-01-01 RX ADMIN — Medication 2000 UNITS: at 11:56

## 2021-01-01 RX ADMIN — METOPROLOL TARTRATE 50 MG: 50 TABLET, FILM COATED ORAL at 11:18

## 2021-01-01 RX ADMIN — Medication 30 MG: at 22:35

## 2021-01-01 RX ADMIN — DOCUSATE SODIUM 100 MG: 50 LIQUID ORAL at 22:27

## 2021-01-01 RX ADMIN — ENOXAPARIN SODIUM 40 MG: 100 INJECTION SUBCUTANEOUS at 22:47

## 2021-01-01 RX ADMIN — ALPRAZOLAM 0.5 MG: 0.25 TABLET ORAL at 11:15

## 2021-01-01 RX ADMIN — SODIUM CHLORIDE 500 ML: 9 INJECTION, SOLUTION INTRAVENOUS at 02:07

## 2021-01-01 RX ADMIN — ALBUTEROL SULFATE 2 PUFF: 108 INHALANT RESPIRATORY (INHALATION) at 08:01

## 2021-01-01 RX ADMIN — PREDNISONE 20 MG: 20 TABLET ORAL at 08:00

## 2021-01-01 RX ADMIN — BARICITINIB 4 MG: 2 TABLET, FILM COATED ORAL at 09:19

## 2021-01-01 RX ADMIN — ACETAMINOPHEN 650 MG: 325 TABLET ORAL at 16:10

## 2021-01-01 RX ADMIN — ENOXAPARIN SODIUM 40 MG: 100 INJECTION SUBCUTANEOUS at 09:25

## 2021-01-01 RX ADMIN — METOPROLOL TARTRATE 50 MG: 50 TABLET, FILM COATED ORAL at 22:18

## 2021-01-01 RX ADMIN — REMDESIVIR 100 MG: 100 INJECTION, POWDER, LYOPHILIZED, FOR SOLUTION INTRAVENOUS at 11:20

## 2021-01-01 RX ADMIN — DEXAMETHASONE SODIUM PHOSPHATE 6 MG: 10 INJECTION INTRAMUSCULAR; INTRAVENOUS at 22:22

## 2021-01-01 RX ADMIN — ENOXAPARIN SODIUM 40 MG: 100 INJECTION SUBCUTANEOUS at 22:22

## 2021-01-01 RX ADMIN — Medication 30 MG: at 09:19

## 2021-01-01 RX ADMIN — Medication 30 MG: at 22:24

## 2021-01-01 RX ADMIN — DEXAMETHASONE SODIUM PHOSPHATE 6 MG: 10 INJECTION INTRAMUSCULAR; INTRAVENOUS at 20:51

## 2021-01-01 RX ADMIN — ACETAMINOPHEN 650 MG: 325 TABLET ORAL at 08:55

## 2021-01-01 RX ADMIN — DOCUSATE SODIUM 100 MG: 50 LIQUID ORAL at 10:08

## 2021-01-01 RX ADMIN — ENOXAPARIN SODIUM 40 MG: 100 INJECTION SUBCUTANEOUS at 09:32

## 2021-01-01 RX ADMIN — METOPROLOL TARTRATE 50 MG: 50 TABLET, FILM COATED ORAL at 08:55

## 2021-01-01 RX ADMIN — PREDNISONE 20 MG: 20 TABLET ORAL at 18:16

## 2021-01-01 RX ADMIN — Medication 10 ML: at 21:20

## 2021-01-01 RX ADMIN — ACETAMINOPHEN 650 MG: 325 TABLET ORAL at 00:57

## 2021-01-01 RX ADMIN — HYDROXYZINE PAMOATE 25 MG: 25 CAPSULE ORAL at 03:04

## 2021-01-01 RX ADMIN — PIPERACILLIN SODIUM AND TAZOBACTAM SODIUM 3.38 G: 3; .375 INJECTION, POWDER, LYOPHILIZED, FOR SOLUTION INTRAVENOUS at 14:33

## 2021-01-01 RX ADMIN — ASPIRIN 81 MG CHEWABLE TABLET 81 MG: 81 TABLET CHEWABLE at 10:04

## 2021-01-01 RX ADMIN — METOPROLOL TARTRATE 50 MG: 50 TABLET, FILM COATED ORAL at 23:17

## 2021-01-01 RX ADMIN — ACETAMINOPHEN 650 MG: 325 TABLET ORAL at 05:37

## 2021-01-01 RX ADMIN — BUDESONIDE AND FORMOTEROL FUMARATE DIHYDRATE 2 PUFF: 160; 4.5 AEROSOL RESPIRATORY (INHALATION) at 19:53

## 2021-01-01 RX ADMIN — Medication 10 ML: at 22:42

## 2021-01-01 RX ADMIN — GUAIFENESIN AND CODEINE PHOSPHATE 5 ML: 10; 100 LIQUID ORAL at 07:00

## 2021-01-01 RX ADMIN — DEXAMETHASONE SODIUM PHOSPHATE 6 MG: 10 INJECTION INTRAMUSCULAR; INTRAVENOUS at 20:17

## 2021-01-01 RX ADMIN — BARICITINIB 4 MG: 2 TABLET, FILM COATED ORAL at 11:14

## 2021-01-01 RX ADMIN — HYDROXYZINE PAMOATE 25 MG: 25 CAPSULE ORAL at 11:56

## 2021-01-01 RX ADMIN — ALBUTEROL SULFATE 2 PUFF: 108 INHALANT RESPIRATORY (INHALATION) at 07:47

## 2021-01-01 RX ADMIN — METOPROLOL TARTRATE 50 MG: 50 TABLET, FILM COATED ORAL at 22:46

## 2021-01-01 RX ADMIN — METOPROLOL TARTRATE 50 MG: 50 TABLET, FILM COATED ORAL at 09:19

## 2021-01-01 RX ADMIN — Medication 10 ML: at 21:21

## 2021-01-01 RX ADMIN — ENOXAPARIN SODIUM 40 MG: 100 INJECTION SUBCUTANEOUS at 21:09

## 2021-01-01 RX ADMIN — GUAIFENESIN 400 MG: 200 SOLUTION ORAL at 08:59

## 2021-01-01 RX ADMIN — ENOXAPARIN SODIUM 40 MG: 100 INJECTION SUBCUTANEOUS at 08:33

## 2021-01-01 RX ADMIN — FAMOTIDINE 20 MG: 10 INJECTION, SOLUTION INTRAVENOUS at 09:34

## 2021-01-01 RX ADMIN — PIPERACILLIN SODIUM AND TAZOBACTAM SODIUM 3.38 G: 3; .375 INJECTION, POWDER, LYOPHILIZED, FOR SOLUTION INTRAVENOUS at 19:40

## 2021-01-01 RX ADMIN — BUDESONIDE AND FORMOTEROL FUMARATE DIHYDRATE 2 PUFF: 160; 4.5 AEROSOL RESPIRATORY (INHALATION) at 07:54

## 2021-01-01 RX ADMIN — HYDROXYZINE PAMOATE 25 MG: 25 CAPSULE ORAL at 00:57

## 2021-01-01 RX ADMIN — DOCUSATE SODIUM 100 MG: 50 LIQUID ORAL at 21:06

## 2021-01-01 RX ADMIN — ENOXAPARIN SODIUM 40 MG: 100 INJECTION SUBCUTANEOUS at 21:08

## 2021-01-01 RX ADMIN — ENOXAPARIN SODIUM 40 MG: 100 INJECTION SUBCUTANEOUS at 11:20

## 2021-01-01 RX ADMIN — Medication 30 MG: at 22:23

## 2021-01-01 RX ADMIN — BUDESONIDE AND FORMOTEROL FUMARATE DIHYDRATE 2 PUFF: 160; 4.5 AEROSOL RESPIRATORY (INHALATION) at 08:19

## 2021-01-01 RX ADMIN — REMDESIVIR 100 MG: 100 INJECTION, POWDER, LYOPHILIZED, FOR SOLUTION INTRAVENOUS at 09:14

## 2021-01-01 RX ADMIN — PIPERACILLIN SODIUM AND TAZOBACTAM SODIUM 3.38 G: 3; .375 INJECTION, POWDER, LYOPHILIZED, FOR SOLUTION INTRAVENOUS at 14:43

## 2021-01-01 RX ADMIN — DILTIAZEM HYDROCHLORIDE 120 MG: 120 CAPSULE, COATED, EXTENDED RELEASE ORAL at 08:44

## 2021-01-01 RX ADMIN — ALPRAZOLAM 0.5 MG: 0.25 TABLET ORAL at 07:49

## 2021-01-01 RX ADMIN — GUAIFENESIN 600 MG: 600 TABLET, EXTENDED RELEASE ORAL at 08:55

## 2021-01-01 RX ADMIN — Medication 10 ML: at 14:00

## 2021-01-01 RX ADMIN — PROPOFOL 40 MG: 10 INJECTION, EMULSION INTRAVENOUS at 15:22

## 2021-01-01 RX ADMIN — FUROSEMIDE 20 MG: 10 INJECTION INTRAMUSCULAR; INTRAVENOUS at 17:22

## 2021-01-01 RX ADMIN — FUROSEMIDE 40 MG: 10 INJECTION, SOLUTION INTRAMUSCULAR; INTRAVENOUS at 11:28

## 2021-01-01 RX ADMIN — METOPROLOL TARTRATE 50 MG: 50 TABLET, FILM COATED ORAL at 10:23

## 2021-01-01 RX ADMIN — DEXAMETHASONE SODIUM PHOSPHATE 4 MG: 10 INJECTION INTRAMUSCULAR; INTRAVENOUS at 22:35

## 2021-01-01 RX ADMIN — SODIUM CHLORIDE 100 ML/HR: 9 INJECTION, SOLUTION INTRAVENOUS at 11:52

## 2021-01-01 RX ADMIN — ACETAMINOPHEN 650 MG: 325 TABLET ORAL at 22:40

## 2021-01-01 RX ADMIN — OXYCODONE HYDROCHLORIDE AND ACETAMINOPHEN 500 MG: 500 TABLET ORAL at 09:19

## 2021-01-01 RX ADMIN — FAMOTIDINE 20 MG: 20 TABLET ORAL at 22:36

## 2021-01-01 RX ADMIN — POTASSIUM & SODIUM PHOSPHATES POWDER PACK 280-160-250 MG 2 PACKET: 280-160-250 PACK at 15:28

## 2021-01-01 RX ADMIN — GUAIFENESIN AND CODEINE PHOSPHATE 5 ML: 10; 100 LIQUID ORAL at 22:41

## 2021-01-01 RX ADMIN — ACETAMINOPHEN 650 MG: 325 TABLET ORAL at 21:53

## 2021-01-01 RX ADMIN — GUAIFENESIN 600 MG: 600 TABLET, EXTENDED RELEASE ORAL at 08:01

## 2021-01-01 RX ADMIN — HYDROXYZINE PAMOATE 25 MG: 25 CAPSULE ORAL at 19:06

## 2021-01-01 RX ADMIN — ALPRAZOLAM 1 MG: 1 TABLET ORAL at 04:29

## 2021-01-01 RX ADMIN — ACETAMINOPHEN 650 MG: 325 TABLET ORAL at 08:57

## 2021-01-01 RX ADMIN — Medication 10 ML: at 05:18

## 2021-01-01 RX ADMIN — Medication 10 ML: at 14:42

## 2021-01-01 RX ADMIN — Medication 10 ML: at 06:35

## 2021-01-01 RX ADMIN — ENOXAPARIN SODIUM 40 MG: 100 INJECTION SUBCUTANEOUS at 08:55

## 2021-01-01 RX ADMIN — METOPROLOL TARTRATE 50 MG: 50 TABLET, FILM COATED ORAL at 20:18

## 2021-01-01 RX ADMIN — BARICITINIB 4 MG: 2 TABLET, FILM COATED ORAL at 16:57

## 2021-01-01 RX ADMIN — Medication 10 ML: at 05:12

## 2021-01-01 RX ADMIN — OXYCODONE HYDROCHLORIDE AND ACETAMINOPHEN 500 MG: 500 TABLET ORAL at 11:18

## 2021-01-01 RX ADMIN — HYDROXYZINE PAMOATE 25 MG: 25 CAPSULE ORAL at 15:29

## 2021-01-01 RX ADMIN — DEXAMETHASONE SODIUM PHOSPHATE 6 MG: 10 INJECTION INTRAMUSCULAR; INTRAVENOUS at 07:16

## 2021-01-01 RX ADMIN — ALPRAZOLAM 0.5 MG: 0.25 TABLET ORAL at 17:07

## 2021-01-01 RX ADMIN — GUAIFENESIN 1200 MG: 600 TABLET, EXTENDED RELEASE ORAL at 21:00

## 2021-01-01 RX ADMIN — ACETAMINOPHEN 650 MG: 325 TABLET ORAL at 11:31

## 2021-01-01 RX ADMIN — BENZONATATE 200 MG: 100 CAPSULE ORAL at 17:58

## 2021-01-01 RX ADMIN — DOXYCYCLINE 100 MG: 100 INJECTION, POWDER, LYOPHILIZED, FOR SOLUTION INTRAVENOUS at 17:44

## 2021-01-01 RX ADMIN — METOPROLOL TARTRATE 50 MG: 50 TABLET, FILM COATED ORAL at 10:04

## 2021-01-01 RX ADMIN — ACETAMINOPHEN 650 MG: 325 TABLET ORAL at 18:42

## 2021-01-01 RX ADMIN — GUAIFENESIN 600 MG: 600 TABLET, EXTENDED RELEASE ORAL at 10:07

## 2021-01-01 RX ADMIN — DEXAMETHASONE SODIUM PHOSPHATE 6 MG: 10 INJECTION INTRAMUSCULAR; INTRAVENOUS at 09:07

## 2021-01-01 RX ADMIN — PIPERACILLIN SODIUM AND TAZOBACTAM SODIUM 3.38 G: 3; .375 INJECTION, POWDER, LYOPHILIZED, FOR SOLUTION INTRAVENOUS at 22:45

## 2021-01-01 RX ADMIN — ALPRAZOLAM 1 MG: 1 TABLET ORAL at 11:56

## 2021-01-01 RX ADMIN — Medication 30 MG: at 08:00

## 2021-01-01 RX ADMIN — Medication 30 MG: at 08:44

## 2021-01-01 RX ADMIN — METOPROLOL TARTRATE 50 MG: 50 TABLET, FILM COATED ORAL at 22:36

## 2021-01-01 RX ADMIN — BUDESONIDE AND FORMOTEROL FUMARATE DIHYDRATE 2 PUFF: 160; 4.5 AEROSOL RESPIRATORY (INHALATION) at 19:46

## 2021-01-01 RX ADMIN — PIPERACILLIN SODIUM AND TAZOBACTAM SODIUM 3.38 G: 3; .375 INJECTION, POWDER, LYOPHILIZED, FOR SOLUTION INTRAVENOUS at 14:45

## 2021-01-01 RX ADMIN — FAMOTIDINE 20 MG: 10 INJECTION, SOLUTION INTRAVENOUS at 09:22

## 2021-01-01 RX ADMIN — AMLODIPINE BESYLATE 5 MG: 5 TABLET ORAL at 10:04

## 2021-01-01 RX ADMIN — OXYCODONE HYDROCHLORIDE AND ACETAMINOPHEN 500 MG: 500 TABLET ORAL at 11:05

## 2021-01-01 RX ADMIN — PIPERACILLIN SODIUM AND TAZOBACTAM SODIUM 3.38 G: 3; .375 INJECTION, POWDER, LYOPHILIZED, FOR SOLUTION INTRAVENOUS at 22:27

## 2021-01-01 RX ADMIN — GUAIFENESIN AND CODEINE PHOSPHATE 5 ML: 10; 100 LIQUID ORAL at 20:01

## 2021-01-01 RX ADMIN — GUAIFENESIN AND CODEINE PHOSPHATE 5 ML: 10; 100 LIQUID ORAL at 15:29

## 2021-01-01 RX ADMIN — PIPERACILLIN SODIUM AND TAZOBACTAM SODIUM 3.38 G: 3; .375 INJECTION, POWDER, LYOPHILIZED, FOR SOLUTION INTRAVENOUS at 02:13

## 2021-01-01 RX ADMIN — DEXAMETHASONE SODIUM PHOSPHATE 6 MG: 10 INJECTION INTRAMUSCULAR; INTRAVENOUS at 10:58

## 2021-01-01 RX ADMIN — Medication 10 ML: at 13:07

## 2021-01-01 RX ADMIN — Medication 2.5 MG/HR: at 07:13

## 2021-01-01 RX ADMIN — PIPERACILLIN SODIUM AND TAZOBACTAM SODIUM 3.38 G: 3; .375 INJECTION, POWDER, LYOPHILIZED, FOR SOLUTION INTRAVENOUS at 21:00

## 2021-01-01 RX ADMIN — MORPHINE SULFATE 2 MG: 2 INJECTION, SOLUTION INTRAMUSCULAR; INTRAVENOUS at 15:28

## 2021-01-01 RX ADMIN — Medication 10 ML: at 18:41

## 2021-01-01 RX ADMIN — ALBUTEROL SULFATE 2 PUFF: 108 INHALANT RESPIRATORY (INHALATION) at 02:41

## 2021-01-01 RX ADMIN — LISINOPRIL 5 MG: 5 TABLET ORAL at 09:31

## 2021-01-01 RX ADMIN — HYDROXYZINE PAMOATE 25 MG: 25 CAPSULE ORAL at 22:18

## 2021-01-01 RX ADMIN — DEXAMETHASONE SODIUM PHOSPHATE 4 MG: 10 INJECTION INTRAMUSCULAR; INTRAVENOUS at 08:55

## 2021-01-01 RX ADMIN — Medication 10 ML: at 22:45

## 2021-01-01 RX ADMIN — TIOTROPIUM BROMIDE INHALATION SPRAY 2 PUFF: 3.12 SPRAY, METERED RESPIRATORY (INHALATION) at 08:46

## 2021-01-01 RX ADMIN — HYDROXYZINE PAMOATE 25 MG: 25 CAPSULE ORAL at 09:31

## 2021-01-01 RX ADMIN — GUAIFENESIN 1200 MG: 600 TABLET, EXTENDED RELEASE ORAL at 20:42

## 2021-01-01 RX ADMIN — METOPROLOL TARTRATE 50 MG: 50 TABLET, FILM COATED ORAL at 08:29

## 2021-01-01 RX ADMIN — OXYCODONE HYDROCHLORIDE AND ACETAMINOPHEN 500 MG: 500 TABLET ORAL at 22:30

## 2021-01-01 RX ADMIN — ACETAMINOPHEN 650 MG: 325 TABLET ORAL at 15:02

## 2021-01-01 RX ADMIN — BARICITINIB 4 MG: 2 TABLET, FILM COATED ORAL at 08:58

## 2021-01-01 RX ADMIN — PRAVASTATIN SODIUM 10 MG: 10 TABLET ORAL at 17:44

## 2021-01-01 RX ADMIN — HYDROXYZINE PAMOATE 25 MG: 25 CAPSULE ORAL at 22:41

## 2021-01-01 RX ADMIN — Medication 30 MG: at 22:22

## 2021-01-01 RX ADMIN — LISINOPRIL 5 MG: 5 TABLET ORAL at 10:08

## 2021-01-01 RX ADMIN — HYDROXYZINE PAMOATE 25 MG: 25 CAPSULE ORAL at 22:36

## 2021-01-01 RX ADMIN — DILTIAZEM HYDROCHLORIDE 120 MG: 120 CAPSULE, COATED, EXTENDED RELEASE ORAL at 09:26

## 2021-01-01 RX ADMIN — ALBUTEROL SULFATE 2 PUFF: 108 INHALANT RESPIRATORY (INHALATION) at 01:05

## 2021-01-01 RX ADMIN — PRAVASTATIN SODIUM 10 MG: 10 TABLET ORAL at 18:15

## 2021-01-01 RX ADMIN — METOPROLOL TARTRATE 50 MG: 50 TABLET, FILM COATED ORAL at 08:01

## 2021-01-01 RX ADMIN — PIPERACILLIN SODIUM AND TAZOBACTAM SODIUM 3.38 G: 3; .375 INJECTION, POWDER, LYOPHILIZED, FOR SOLUTION INTRAVENOUS at 14:51

## 2021-01-01 RX ADMIN — Medication 5 ML: at 06:00

## 2021-01-01 RX ADMIN — GUAIFENESIN 400 MG: 200 SOLUTION ORAL at 17:40

## 2021-01-01 RX ADMIN — DILTIAZEM HYDROCHLORIDE 120 MG: 120 CAPSULE, COATED, EXTENDED RELEASE ORAL at 12:04

## 2021-01-01 RX ADMIN — Medication 10 ML: at 14:47

## 2021-01-01 RX ADMIN — DEXAMETHASONE SODIUM PHOSPHATE 6 MG: 10 INJECTION INTRAMUSCULAR; INTRAVENOUS at 08:01

## 2021-01-01 RX ADMIN — Medication 30 MG: at 13:05

## 2021-01-01 RX ADMIN — Medication 10 ML: at 06:16

## 2021-01-01 RX ADMIN — FAMOTIDINE 20 MG: 20 TABLET, FILM COATED ORAL at 21:53

## 2021-01-01 RX ADMIN — GUAIFENESIN AND CODEINE PHOSPHATE 5 ML: 10; 100 LIQUID ORAL at 00:57

## 2021-01-01 RX ADMIN — METOPROLOL TARTRATE 50 MG: 50 TABLET, FILM COATED ORAL at 11:05

## 2021-01-01 RX ADMIN — DILTIAZEM HYDROCHLORIDE 120 MG: 120 CAPSULE, COATED, EXTENDED RELEASE ORAL at 11:18

## 2021-01-01 RX ADMIN — DEXAMETHASONE SODIUM PHOSPHATE 6 MG: 10 INJECTION INTRAMUSCULAR; INTRAVENOUS at 21:06

## 2021-01-01 RX ADMIN — AZITHROMYCIN MONOHYDRATE 500 MG: 500 TABLET ORAL at 11:00

## 2021-01-01 RX ADMIN — PREDNISONE 20 MG: 20 TABLET ORAL at 11:24

## 2021-01-01 RX ADMIN — ACETAMINOPHEN 650 MG: 325 TABLET ORAL at 21:36

## 2021-01-01 RX ADMIN — ACETAMINOPHEN 650 MG: 325 TABLET ORAL at 14:45

## 2021-01-01 RX ADMIN — DOXYCYCLINE 100 MG: 100 INJECTION, POWDER, LYOPHILIZED, FOR SOLUTION INTRAVENOUS at 08:59

## 2021-01-01 RX ADMIN — Medication 10 ML: at 22:14

## 2021-01-01 RX ADMIN — BARICITINIB 4 MG: 2 TABLET, FILM COATED ORAL at 10:09

## 2021-01-01 RX ADMIN — POTASSIUM & SODIUM PHOSPHATES POWDER PACK 280-160-250 MG 2 PACKET: 280-160-250 PACK at 18:23

## 2021-01-01 RX ADMIN — Medication 10 ML: at 21:56

## 2021-01-01 RX ADMIN — GUAIFENESIN 600 MG: 600 TABLET, EXTENDED RELEASE ORAL at 10:24

## 2021-01-01 RX ADMIN — BUDESONIDE AND FORMOTEROL FUMARATE DIHYDRATE 2 PUFF: 160; 4.5 AEROSOL RESPIRATORY (INHALATION) at 19:32

## 2021-01-01 RX ADMIN — BISACODYL 5 MG: 5 TABLET, COATED ORAL at 11:25

## 2021-01-01 RX ADMIN — THIAMINE HYDROCHLORIDE 100 MG: 100 INJECTION, SOLUTION INTRAMUSCULAR; INTRAVENOUS at 09:23

## 2021-01-01 RX ADMIN — AZITHROMYCIN 500 MG: 500 INJECTION, POWDER, LYOPHILIZED, FOR SOLUTION INTRAVENOUS at 15:44

## 2021-01-01 RX ADMIN — BUDESONIDE AND FORMOTEROL FUMARATE DIHYDRATE 2 PUFF: 160; 4.5 AEROSOL RESPIRATORY (INHALATION) at 20:14

## 2021-01-01 RX ADMIN — METOPROLOL TARTRATE 50 MG: 50 TABLET, FILM COATED ORAL at 08:59

## 2021-01-01 RX ADMIN — METOPROLOL TARTRATE 50 MG: 50 TABLET, FILM COATED ORAL at 09:26

## 2021-01-01 RX ADMIN — Medication 10 ML: at 22:23

## 2021-01-01 RX ADMIN — ACETAMINOPHEN 650 MG: 325 TABLET ORAL at 11:47

## 2021-01-01 RX ADMIN — Medication 10 ML: at 22:46

## 2021-01-01 RX ADMIN — ALBUTEROL SULFATE 2 PUFF: 108 INHALANT RESPIRATORY (INHALATION) at 20:29

## 2021-01-01 RX ADMIN — Medication 10 ML: at 20:20

## 2021-01-01 RX ADMIN — FAMOTIDINE 20 MG: 20 TABLET ORAL at 23:14

## 2021-01-01 RX ADMIN — ACETAMINOPHEN 650 MG: 325 TABLET ORAL at 17:59

## 2021-01-01 RX ADMIN — Medication 30 MG: at 22:47

## 2021-01-01 RX ADMIN — Medication 10 ML: at 08:45

## 2021-01-01 RX ADMIN — PROPOFOL 20 MG: 10 INJECTION, EMULSION INTRAVENOUS at 15:30

## 2021-01-01 RX ADMIN — PRAVASTATIN SODIUM 10 MG: 10 TABLET ORAL at 18:20

## 2021-01-01 RX ADMIN — BENZONATATE 200 MG: 100 CAPSULE ORAL at 16:00

## 2021-01-01 RX ADMIN — DEXAMETHASONE SODIUM PHOSPHATE 6 MG: 10 INJECTION INTRAMUSCULAR; INTRAVENOUS at 22:47

## 2021-01-01 RX ADMIN — BENZONATATE 200 MG: 100 CAPSULE ORAL at 22:14

## 2021-01-01 RX ADMIN — PREDNISONE 20 MG: 20 TABLET ORAL at 10:30

## 2021-01-01 RX ADMIN — DEXAMETHASONE SODIUM PHOSPHATE 6 MG: 10 INJECTION INTRAMUSCULAR; INTRAVENOUS at 10:08

## 2021-01-01 RX ADMIN — BUDESONIDE AND FORMOTEROL FUMARATE DIHYDRATE 2 PUFF: 160; 4.5 AEROSOL RESPIRATORY (INHALATION) at 09:23

## 2021-01-01 RX ADMIN — ALBUTEROL SULFATE 2 PUFF: 108 INHALANT RESPIRATORY (INHALATION) at 20:58

## 2021-01-01 RX ADMIN — BUDESONIDE AND FORMOTEROL FUMARATE DIHYDRATE 2 PUFF: 160; 4.5 AEROSOL RESPIRATORY (INHALATION) at 21:15

## 2021-01-01 RX ADMIN — BARICITINIB 4 MG: 2 TABLET, FILM COATED ORAL at 12:03

## 2021-01-01 RX ADMIN — Medication 2000 UNITS: at 08:01

## 2021-01-01 RX ADMIN — DILTIAZEM HYDROCHLORIDE 120 MG: 120 CAPSULE, COATED, EXTENDED RELEASE ORAL at 08:01

## 2021-01-01 RX ADMIN — Medication 10 ML: at 05:45

## 2021-01-01 RX ADMIN — GUAIFENESIN 400 MG: 200 SOLUTION ORAL at 18:16

## 2021-01-01 RX ADMIN — PIPERACILLIN SODIUM AND TAZOBACTAM SODIUM 3.38 G: 3; .375 INJECTION, POWDER, LYOPHILIZED, FOR SOLUTION INTRAVENOUS at 05:49

## 2021-01-01 RX ADMIN — Medication 10 ML: at 07:52

## 2021-01-01 RX ADMIN — METOPROLOL TARTRATE 50 MG: 50 TABLET, FILM COATED ORAL at 21:07

## 2021-01-01 RX ADMIN — FAMOTIDINE 20 MG: 20 TABLET ORAL at 20:36

## 2021-01-01 RX ADMIN — Medication 30 MG: at 21:07

## 2021-01-01 RX ADMIN — DILTIAZEM HYDROCHLORIDE 120 MG: 120 CAPSULE, COATED, EXTENDED RELEASE ORAL at 10:23

## 2021-01-01 RX ADMIN — Medication 2000 UNITS: at 09:27

## 2021-01-01 RX ADMIN — PREDNISONE 20 MG: 20 TABLET ORAL at 08:55

## 2021-01-01 RX ADMIN — GUAIFENESIN 400 MG: 200 SOLUTION ORAL at 18:19

## 2021-01-01 RX ADMIN — DEXAMETHASONE SODIUM PHOSPHATE 6 MG: 10 INJECTION INTRAMUSCULAR; INTRAVENOUS at 23:09

## 2021-01-01 RX ADMIN — Medication 10 ML: at 16:32

## 2021-01-01 RX ADMIN — BENZONATATE 200 MG: 100 CAPSULE ORAL at 17:44

## 2021-01-01 RX ADMIN — ALBUTEROL SULFATE 2.5 MG: 2.5 SOLUTION RESPIRATORY (INHALATION) at 11:25

## 2021-01-01 RX ADMIN — METOPROLOL TARTRATE 50 MG: 50 TABLET, FILM COATED ORAL at 20:01

## 2021-01-01 RX ADMIN — ALBUTEROL SULFATE 2 PUFF: 108 INHALANT RESPIRATORY (INHALATION) at 02:57

## 2021-01-01 RX ADMIN — PRAVASTATIN SODIUM 10 MG: 10 TABLET ORAL at 18:00

## 2021-01-01 RX ADMIN — BUDESONIDE AND FORMOTEROL FUMARATE DIHYDRATE 2 PUFF: 160; 4.5 AEROSOL RESPIRATORY (INHALATION) at 08:11

## 2021-01-01 RX ADMIN — ACETAMINOPHEN 650 MG: 325 TABLET ORAL at 17:57

## 2021-01-01 RX ADMIN — LISINOPRIL 5 MG: 5 TABLET ORAL at 08:55

## 2021-01-01 RX ADMIN — ACETAMINOPHEN 650 MG: 325 TABLET ORAL at 14:51

## 2021-01-01 RX ADMIN — DEXAMETHASONE SODIUM PHOSPHATE 6 MG: 10 INJECTION INTRAMUSCULAR; INTRAVENOUS at 07:49

## 2021-01-01 RX ADMIN — ALBUTEROL SULFATE 2 PUFF: 108 INHALANT RESPIRATORY (INHALATION) at 19:54

## 2021-01-01 RX ADMIN — PIPERACILLIN SODIUM AND TAZOBACTAM SODIUM 3.38 G: 3; .375 INJECTION, POWDER, LYOPHILIZED, FOR SOLUTION INTRAVENOUS at 05:10

## 2021-01-01 RX ADMIN — DEXAMETHASONE SODIUM PHOSPHATE 6 MG: 10 INJECTION INTRAMUSCULAR; INTRAVENOUS at 21:08

## 2021-01-01 RX ADMIN — DOCUSATE SODIUM 100 MG: 50 LIQUID ORAL at 08:55

## 2021-01-01 RX ADMIN — GUAIFENESIN 400 MG: 200 SOLUTION ORAL at 12:20

## 2021-01-01 RX ADMIN — GUAIFENESIN AND CODEINE PHOSPHATE 5 ML: 10; 100 LIQUID ORAL at 14:45

## 2021-01-01 RX ADMIN — MORPHINE SULFATE 2 MG: 2 INJECTION, SOLUTION INTRAMUSCULAR; INTRAVENOUS at 23:27

## 2021-01-01 RX ADMIN — ENOXAPARIN SODIUM 40 MG: 100 INJECTION SUBCUTANEOUS at 08:44

## 2021-01-01 RX ADMIN — MULTIVITAMIN TABLET 1 TABLET: TABLET at 09:50

## 2021-01-01 RX ADMIN — AMLODIPINE BESYLATE 5 MG: 5 TABLET ORAL at 09:50

## 2021-01-01 RX ADMIN — METOPROLOL TARTRATE 2.5 MG: 5 INJECTION INTRAVENOUS at 03:41

## 2021-01-01 RX ADMIN — DEXAMETHASONE SODIUM PHOSPHATE 6 MG: 10 INJECTION INTRAMUSCULAR; INTRAVENOUS at 22:25

## 2021-01-01 RX ADMIN — Medication 10 ML: at 13:12

## 2021-01-01 RX ADMIN — ACETAMINOPHEN 650 MG: 325 TABLET ORAL at 06:06

## 2021-01-01 RX ADMIN — PIPERACILLIN SODIUM AND TAZOBACTAM SODIUM 3.38 G: 3; .375 INJECTION, POWDER, LYOPHILIZED, FOR SOLUTION INTRAVENOUS at 07:52

## 2021-01-01 RX ADMIN — ALBUTEROL SULFATE 2 PUFF: 108 INHALANT RESPIRATORY (INHALATION) at 13:10

## 2021-01-01 RX ADMIN — LISINOPRIL 5 MG: 5 TABLET ORAL at 08:58

## 2021-01-01 RX ADMIN — ACETAMINOPHEN 650 MG: 325 TABLET ORAL at 15:54

## 2021-01-01 RX ADMIN — POTASSIUM & SODIUM PHOSPHATES POWDER PACK 280-160-250 MG 2 PACKET: 280-160-250 PACK at 20:17

## 2021-01-01 RX ADMIN — THIAMINE HYDROCHLORIDE 100 MG: 100 INJECTION, SOLUTION INTRAMUSCULAR; INTRAVENOUS at 11:51

## 2021-01-01 RX ADMIN — ALBUTEROL SULFATE 2 PUFF: 108 INHALANT RESPIRATORY (INHALATION) at 01:35

## 2021-01-01 RX ADMIN — Medication 30 MG: at 13:21

## 2021-01-01 RX ADMIN — GUAIFENESIN 600 MG: 600 TABLET, EXTENDED RELEASE ORAL at 20:18

## 2021-01-01 RX ADMIN — FAMOTIDINE 20 MG: 10 INJECTION, SOLUTION INTRAVENOUS at 15:36

## 2021-01-01 RX ADMIN — Medication 10 ML: at 14:46

## 2021-01-01 RX ADMIN — FAMOTIDINE 20 MG: 20 TABLET ORAL at 22:19

## 2021-01-01 RX ADMIN — HYDROXYZINE PAMOATE 25 MG: 25 CAPSULE ORAL at 20:01

## 2021-01-01 RX ADMIN — METOPROLOL TARTRATE 5 MG: 1 INJECTION, SOLUTION INTRAVENOUS at 09:34

## 2021-01-01 RX ADMIN — BUDESONIDE AND FORMOTEROL FUMARATE DIHYDRATE 2 PUFF: 160; 4.5 AEROSOL RESPIRATORY (INHALATION) at 08:54

## 2021-01-01 RX ADMIN — FAMOTIDINE 20 MG: 10 INJECTION, SOLUTION INTRAVENOUS at 10:59

## 2021-01-01 RX ADMIN — Medication 30 MG: at 22:41

## 2021-01-01 RX ADMIN — SODIUM CHLORIDE 75 ML/HR: 9 INJECTION, SOLUTION INTRAVENOUS at 17:22

## 2021-01-01 RX ADMIN — ALBUTEROL SULFATE 2 PUFF: 108 INHALANT RESPIRATORY (INHALATION) at 14:10

## 2021-01-01 RX ADMIN — ENOXAPARIN SODIUM 40 MG: 100 INJECTION SUBCUTANEOUS at 22:56

## 2021-01-01 RX ADMIN — Medication 30 MG: at 22:25

## 2021-01-01 RX ADMIN — ACETAMINOPHEN 650 MG: 325 TABLET ORAL at 10:29

## 2021-01-01 RX ADMIN — FAMOTIDINE 20 MG: 20 TABLET ORAL at 09:31

## 2021-01-01 RX ADMIN — Medication 2000 UNITS: at 11:06

## 2021-01-01 RX ADMIN — PIPERACILLIN SODIUM AND TAZOBACTAM SODIUM 3.38 G: 3; .375 INJECTION, POWDER, LYOPHILIZED, FOR SOLUTION INTRAVENOUS at 06:19

## 2021-01-01 RX ADMIN — PIPERACILLIN SODIUM AND TAZOBACTAM SODIUM 3.38 G: 3; .375 INJECTION, POWDER, LYOPHILIZED, FOR SOLUTION INTRAVENOUS at 02:57

## 2021-01-01 RX ADMIN — BUDESONIDE AND FORMOTEROL FUMARATE DIHYDRATE 2 PUFF: 160; 4.5 AEROSOL RESPIRATORY (INHALATION) at 20:48

## 2021-01-01 RX ADMIN — Medication 10 ML: at 17:53

## 2021-01-01 RX ADMIN — Medication 10 ML: at 22:25

## 2021-01-01 RX ADMIN — GUAIFENESIN 400 MG: 200 SOLUTION ORAL at 18:42

## 2021-01-01 RX ADMIN — BARICITINIB 4 MG: 2 TABLET, FILM COATED ORAL at 09:00

## 2021-01-01 RX ADMIN — PIPERACILLIN SODIUM AND TAZOBACTAM SODIUM 3.38 G: 3; .375 INJECTION, POWDER, LYOPHILIZED, FOR SOLUTION INTRAVENOUS at 18:57

## 2021-01-01 RX ADMIN — Medication 30 MG: at 09:25

## 2021-01-01 RX ADMIN — GUAIFENESIN 400 MG: 200 SOLUTION ORAL at 06:27

## 2021-01-01 RX ADMIN — GUAIFENESIN 600 MG: 600 TABLET, EXTENDED RELEASE ORAL at 09:31

## 2021-01-01 RX ADMIN — OXYCODONE HYDROCHLORIDE AND ACETAMINOPHEN 500 MG: 500 TABLET ORAL at 11:56

## 2021-01-01 RX ADMIN — ACETAMINOPHEN 650 MG: 325 TABLET ORAL at 04:29

## 2021-01-01 RX ADMIN — PREDNISONE 20 MG: 20 TABLET ORAL at 17:44

## 2021-01-01 RX ADMIN — BUDESONIDE AND FORMOTEROL FUMARATE DIHYDRATE 2 PUFF: 160; 4.5 AEROSOL RESPIRATORY (INHALATION) at 20:03

## 2021-01-01 RX ADMIN — OXYCODONE HYDROCHLORIDE AND ACETAMINOPHEN 500 MG: 500 TABLET ORAL at 22:36

## 2021-01-01 RX ADMIN — Medication 10 ML: at 14:21

## 2021-01-01 RX ADMIN — ACETAMINOPHEN 650 MG: 325 TABLET ORAL at 17:55

## 2021-01-01 RX ADMIN — GUAIFENESIN 400 MG: 200 SOLUTION ORAL at 17:57

## 2021-01-01 RX ADMIN — TIOTROPIUM BROMIDE INHALATION SPRAY 2 PUFF: 3.12 SPRAY, METERED RESPIRATORY (INHALATION) at 08:53

## 2021-01-01 RX ADMIN — BARICITINIB 4 MG: 2 TABLET, FILM COATED ORAL at 18:54

## 2021-01-01 RX ADMIN — TIOTROPIUM BROMIDE INHALATION SPRAY 2 PUFF: 3.12 SPRAY, METERED RESPIRATORY (INHALATION) at 09:23

## 2021-01-01 RX ADMIN — DEXAMETHASONE SODIUM PHOSPHATE 6 MG: 10 INJECTION INTRAMUSCULAR; INTRAVENOUS at 22:34

## 2021-01-01 RX ADMIN — ALBUTEROL SULFATE 2 PUFF: 108 INHALANT RESPIRATORY (INHALATION) at 03:53

## 2021-01-01 RX ADMIN — ACETAMINOPHEN 650 MG: 325 TABLET ORAL at 08:01

## 2021-01-01 RX ADMIN — LISINOPRIL 5 MG: 5 TABLET ORAL at 08:44

## 2021-01-01 RX ADMIN — MULTIVITAMIN TABLET 1 TABLET: TABLET at 11:24

## 2021-01-01 RX ADMIN — PRAVASTATIN SODIUM 10 MG: 10 TABLET ORAL at 18:42

## 2021-01-01 RX ADMIN — DILTIAZEM HYDROCHLORIDE 120 MG: 120 CAPSULE, COATED, EXTENDED RELEASE ORAL at 08:55

## 2021-01-01 RX ADMIN — Medication 10 ML: at 13:42

## 2021-01-01 RX ADMIN — Medication 10 ML: at 19:00

## 2021-01-01 RX ADMIN — HYDROXYZINE PAMOATE 25 MG: 25 CAPSULE ORAL at 19:57

## 2021-01-01 RX ADMIN — PIPERACILLIN SODIUM AND TAZOBACTAM SODIUM 3.38 G: 3; .375 INJECTION, POWDER, LYOPHILIZED, FOR SOLUTION INTRAVENOUS at 13:06

## 2021-01-01 RX ADMIN — METOPROLOL TARTRATE 50 MG: 50 TABLET, FILM COATED ORAL at 20:36

## 2021-01-01 RX ADMIN — METOPROLOL TARTRATE 50 MG: 50 TABLET, FILM COATED ORAL at 22:24

## 2021-01-01 RX ADMIN — Medication 10 ML: at 14:43

## 2021-01-01 RX ADMIN — BUDESONIDE AND FORMOTEROL FUMARATE DIHYDRATE 2 PUFF: 160; 4.5 AEROSOL RESPIRATORY (INHALATION) at 19:31

## 2021-01-01 RX ADMIN — PIPERACILLIN SODIUM AND TAZOBACTAM SODIUM 3.38 G: 3; .375 INJECTION, POWDER, LYOPHILIZED, FOR SOLUTION INTRAVENOUS at 06:06

## 2021-01-01 RX ADMIN — GUAIFENESIN 400 MG: 200 SOLUTION ORAL at 13:24

## 2021-01-01 RX ADMIN — LISINOPRIL 5 MG: 5 TABLET ORAL at 16:58

## 2021-01-01 RX ADMIN — GUAIFENESIN 600 MG: 600 TABLET ORAL at 04:31

## 2021-01-01 RX ADMIN — BUDESONIDE AND FORMOTEROL FUMARATE DIHYDRATE 2 PUFF: 160; 4.5 AEROSOL RESPIRATORY (INHALATION) at 20:59

## 2021-01-01 RX ADMIN — Medication 30 MG: at 14:42

## 2021-01-01 RX ADMIN — GUAIFENESIN 1200 MG: 600 TABLET, EXTENDED RELEASE ORAL at 21:21

## 2021-01-01 RX ADMIN — IOPAMIDOL 100 ML: 755 INJECTION, SOLUTION INTRAVENOUS at 12:38

## 2021-01-01 RX ADMIN — HYDROXYZINE PAMOATE 25 MG: 25 CAPSULE ORAL at 03:16

## 2021-01-01 RX ADMIN — THIAMINE HYDROCHLORIDE 100 MG: 100 INJECTION, SOLUTION INTRAMUSCULAR; INTRAVENOUS at 09:35

## 2021-01-01 RX ADMIN — AZITHROMYCIN 500 MG: 500 INJECTION, POWDER, LYOPHILIZED, FOR SOLUTION INTRAVENOUS at 12:20

## 2021-01-01 RX ADMIN — FAMOTIDINE 20 MG: 10 INJECTION, SOLUTION INTRAVENOUS at 09:51

## 2021-01-01 RX ADMIN — ALPRAZOLAM 0.5 MG: 0.25 TABLET ORAL at 22:23

## 2021-01-01 RX ADMIN — GUAIFENESIN 400 MG: 200 SOLUTION ORAL at 05:48

## 2021-01-01 RX ADMIN — ACETAMINOPHEN 650 MG: 325 TABLET ORAL at 22:23

## 2021-01-01 RX ADMIN — BARICITINIB 4 MG: 2 TABLET, FILM COATED ORAL at 08:33

## 2021-01-01 RX ADMIN — GUAIFENESIN AND CODEINE PHOSPHATE 5 ML: 10; 100 LIQUID ORAL at 21:32

## 2021-01-01 RX ADMIN — ALPRAZOLAM 0.5 MG: 0.25 TABLET ORAL at 01:15

## 2021-01-01 RX ADMIN — OXYCODONE HYDROCHLORIDE AND ACETAMINOPHEN 500 MG: 500 TABLET ORAL at 10:23

## 2021-01-01 RX ADMIN — METHYLPREDNISOLONE SODIUM SUCCINATE 40 MG: 40 INJECTION, POWDER, FOR SOLUTION INTRAMUSCULAR; INTRAVENOUS at 21:53

## 2021-01-01 RX ADMIN — GUAIFENESIN AND CODEINE PHOSPHATE 5 ML: 10; 100 LIQUID ORAL at 14:42

## 2021-01-01 RX ADMIN — ENOXAPARIN SODIUM 40 MG: 40 INJECTION SUBCUTANEOUS at 08:56

## 2021-01-01 RX ADMIN — Medication 10 ML: at 23:01

## 2021-01-01 RX ADMIN — Medication 10 ML: at 20:02

## 2021-01-01 RX ADMIN — BUDESONIDE AND FORMOTEROL FUMARATE DIHYDRATE 2 PUFF: 160; 4.5 AEROSOL RESPIRATORY (INHALATION) at 20:16

## 2021-01-01 RX ADMIN — FAMOTIDINE 20 MG: 20 TABLET ORAL at 11:05

## 2021-01-01 RX ADMIN — METOPROLOL TARTRATE 50 MG: 50 TABLET, FILM COATED ORAL at 22:16

## 2021-01-01 RX ADMIN — Medication 10 ML: at 12:05

## 2021-01-01 RX ADMIN — OXYCODONE HYDROCHLORIDE AND ACETAMINOPHEN 500 MG: 500 TABLET ORAL at 21:07

## 2021-01-01 RX ADMIN — FAMOTIDINE 20 MG: 20 TABLET ORAL at 22:23

## 2021-01-01 RX ADMIN — OXYCODONE HYDROCHLORIDE AND ACETAMINOPHEN 500 MG: 500 TABLET ORAL at 22:24

## 2021-01-01 RX ADMIN — ACETAMINOPHEN 650 MG: 325 TABLET ORAL at 05:48

## 2021-01-01 RX ADMIN — TIOTROPIUM BROMIDE INHALATION SPRAY 2 PUFF: 3.12 SPRAY, METERED RESPIRATORY (INHALATION) at 09:52

## 2021-01-01 RX ADMIN — BARICITINIB 4 MG: 2 TABLET, FILM COATED ORAL at 18:00

## 2021-01-01 RX ADMIN — ALBUTEROL SULFATE 2 PUFF: 108 INHALANT RESPIRATORY (INHALATION) at 20:48

## 2021-01-01 RX ADMIN — BARIUM SULFATE 10 ML: 400 PASTE ORAL at 14:00

## 2021-01-01 RX ADMIN — FAMOTIDINE 20 MG: 20 TABLET ORAL at 10:07

## 2021-01-01 RX ADMIN — PIPERACILLIN SODIUM AND TAZOBACTAM SODIUM 3.38 G: 3; .375 INJECTION, POWDER, LYOPHILIZED, FOR SOLUTION INTRAVENOUS at 22:24

## 2021-01-01 RX ADMIN — Medication 2000 UNITS: at 08:55

## 2021-01-01 RX ADMIN — METOPROLOL TARTRATE 50 MG: 50 TABLET, FILM COATED ORAL at 09:31

## 2021-01-01 RX ADMIN — BUDESONIDE AND FORMOTEROL FUMARATE DIHYDRATE 2 PUFF: 160; 4.5 AEROSOL RESPIRATORY (INHALATION) at 08:46

## 2021-01-01 RX ADMIN — BENZONATATE 200 MG: 100 CAPSULE ORAL at 08:55

## 2021-01-01 RX ADMIN — ALBUTEROL SULFATE 2 PUFF: 108 INHALANT RESPIRATORY (INHALATION) at 07:34

## 2021-01-01 RX ADMIN — ENOXAPARIN SODIUM 40 MG: 100 INJECTION SUBCUTANEOUS at 09:19

## 2021-01-01 RX ADMIN — OXYCODONE HYDROCHLORIDE AND ACETAMINOPHEN 500 MG: 500 TABLET ORAL at 08:01

## 2021-01-01 RX ADMIN — Medication 10 ML: at 05:34

## 2021-01-01 RX ADMIN — HYDROXYZINE PAMOATE 25 MG: 25 CAPSULE ORAL at 04:16

## 2021-01-01 RX ADMIN — CEFTRIAXONE 1 G: 1 INJECTION, POWDER, FOR SOLUTION INTRAMUSCULAR; INTRAVENOUS at 07:17

## 2021-01-01 RX ADMIN — DILTIAZEM HYDROCHLORIDE 120 MG: 120 CAPSULE, COATED, EXTENDED RELEASE ORAL at 16:58

## 2021-01-01 RX ADMIN — Medication 10 ML: at 05:48

## 2021-01-01 RX ADMIN — GUAIFENESIN 600 MG: 600 TABLET, EXTENDED RELEASE ORAL at 11:19

## 2021-01-01 RX ADMIN — OXYCODONE HYDROCHLORIDE AND ACETAMINOPHEN 500 MG: 500 TABLET ORAL at 10:08

## 2021-01-01 RX ADMIN — DOCUSATE SODIUM 100 MG: 50 LIQUID ORAL at 22:18

## 2021-01-01 RX ADMIN — DOCUSATE SODIUM 100 MG: 50 LIQUID ORAL at 20:17

## 2021-01-01 RX ADMIN — ACETAMINOPHEN 650 MG: 325 TABLET ORAL at 11:05

## 2021-01-01 RX ADMIN — TIOTROPIUM BROMIDE INHALATION SPRAY 2 PUFF: 3.12 SPRAY, METERED RESPIRATORY (INHALATION) at 08:16

## 2021-01-01 RX ADMIN — ACETAMINOPHEN 650 MG: 325 TABLET ORAL at 11:24

## 2021-01-01 RX ADMIN — BUDESONIDE AND FORMOTEROL FUMARATE DIHYDRATE 2 PUFF: 160; 4.5 AEROSOL RESPIRATORY (INHALATION) at 20:28

## 2021-01-01 RX ADMIN — Medication 10 ML: at 06:03

## 2021-01-01 RX ADMIN — ALBUTEROL SULFATE 2 PUFF: 108 INHALANT RESPIRATORY (INHALATION) at 04:28

## 2021-01-01 RX ADMIN — ALBUTEROL SULFATE 2 PUFF: 108 INHALANT RESPIRATORY (INHALATION) at 13:43

## 2021-01-01 RX ADMIN — GUAIFENESIN 1200 MG: 600 TABLET, EXTENDED RELEASE ORAL at 22:32

## 2021-01-01 RX ADMIN — DOCUSATE SODIUM 100 MG: 50 LIQUID ORAL at 11:08

## 2021-01-01 RX ADMIN — HYDROXYZINE PAMOATE 25 MG: 25 CAPSULE ORAL at 14:45

## 2021-01-01 RX ADMIN — ACETAMINOPHEN 650 MG: 325 TABLET ORAL at 16:32

## 2021-01-01 RX ADMIN — HYDROXYZINE HYDROCHLORIDE 25 MG: 25 INJECTION, SOLUTION INTRAMUSCULAR at 14:32

## 2021-01-01 RX ADMIN — METHYLPREDNISOLONE SODIUM SUCCINATE 40 MG: 40 INJECTION, POWDER, FOR SOLUTION INTRAMUSCULAR; INTRAVENOUS at 15:16

## 2021-01-01 RX ADMIN — Medication 2000 UNITS: at 09:31

## 2021-01-01 RX ADMIN — ALBUTEROL SULFATE 2 PUFF: 108 INHALANT RESPIRATORY (INHALATION) at 01:10

## 2021-01-01 RX ADMIN — ENOXAPARIN SODIUM 40 MG: 100 INJECTION SUBCUTANEOUS at 11:28

## 2021-01-01 RX ADMIN — Medication 10 ML: at 17:32

## 2021-01-01 RX ADMIN — HYDROXYZINE PAMOATE 25 MG: 25 CAPSULE ORAL at 13:21

## 2021-01-01 RX ADMIN — DEXAMETHASONE SODIUM PHOSPHATE 6 MG: 10 INJECTION INTRAMUSCULAR; INTRAVENOUS at 20:01

## 2021-01-01 RX ADMIN — ALBUTEROL SULFATE 2 PUFF: 108 INHALANT RESPIRATORY (INHALATION) at 08:10

## 2021-01-01 RX ADMIN — GUAIFENESIN 400 MG: 200 SOLUTION ORAL at 13:03

## 2021-01-01 RX ADMIN — ALPRAZOLAM 1 MG: 1 TABLET ORAL at 19:06

## 2021-01-01 RX ADMIN — THIAMINE HYDROCHLORIDE 100 MG: 100 INJECTION, SOLUTION INTRAMUSCULAR; INTRAVENOUS at 10:58

## 2021-01-01 RX ADMIN — Medication 10 ML: at 02:15

## 2021-01-01 RX ADMIN — BUDESONIDE AND FORMOTEROL FUMARATE DIHYDRATE 2 PUFF: 160; 4.5 AEROSOL RESPIRATORY (INHALATION) at 08:07

## 2021-01-01 RX ADMIN — PIPERACILLIN SODIUM AND TAZOBACTAM SODIUM 3.38 G: 3; .375 INJECTION, POWDER, LYOPHILIZED, FOR SOLUTION INTRAVENOUS at 18:40

## 2021-01-01 RX ADMIN — GUAIFENESIN 1200 MG: 600 TABLET, EXTENDED RELEASE ORAL at 08:55

## 2021-01-01 RX ADMIN — HYDROXYZINE PAMOATE 25 MG: 25 CAPSULE ORAL at 11:29

## 2021-01-01 RX ADMIN — Medication 30 MG: at 10:23

## 2021-01-01 RX ADMIN — ALBUTEROL SULFATE 2 PUFF: 108 INHALANT RESPIRATORY (INHALATION) at 13:19

## 2021-01-01 RX ADMIN — ENOXAPARIN SODIUM 40 MG: 100 INJECTION SUBCUTANEOUS at 08:02

## 2021-01-01 RX ADMIN — DOCUSATE SODIUM 100 MG: 50 LIQUID ORAL at 20:37

## 2021-01-01 RX ADMIN — ALBUTEROL SULFATE 2 PUFF: 108 INHALANT RESPIRATORY (INHALATION) at 07:54

## 2021-01-01 RX ADMIN — FAMOTIDINE 20 MG: 20 TABLET ORAL at 20:01

## 2021-01-01 RX ADMIN — ENOXAPARIN SODIUM 40 MG: 100 INJECTION SUBCUTANEOUS at 11:06

## 2021-01-01 RX ADMIN — DOCUSATE SODIUM 100 MG: 50 LIQUID ORAL at 11:19

## 2021-01-01 RX ADMIN — BENZONATATE 200 MG: 100 CAPSULE ORAL at 11:22

## 2021-01-01 RX ADMIN — TIOTROPIUM BROMIDE INHALATION SPRAY 2 PUFF: 3.12 SPRAY, METERED RESPIRATORY (INHALATION) at 08:27

## 2021-01-01 RX ADMIN — THIAMINE HYDROCHLORIDE 100 MG: 100 INJECTION, SOLUTION INTRAMUSCULAR; INTRAVENOUS at 16:58

## 2021-01-01 RX ADMIN — Medication 10 ML: at 13:27

## 2021-01-01 RX ADMIN — ALBUTEROL SULFATE 2 PUFF: 108 INHALANT RESPIRATORY (INHALATION) at 22:53

## 2021-01-01 RX ADMIN — Medication 10 ML: at 05:20

## 2021-01-01 RX ADMIN — ALPRAZOLAM 0.5 MG: 0.25 TABLET ORAL at 18:51

## 2021-01-01 RX ADMIN — AMLODIPINE BESYLATE 5 MG: 5 TABLET ORAL at 08:55

## 2021-01-01 RX ADMIN — FAMOTIDINE 20 MG: 20 TABLET ORAL at 11:55

## 2021-01-01 RX ADMIN — ACETAMINOPHEN 650 MG: 325 TABLET ORAL at 06:23

## 2021-01-01 RX ADMIN — Medication 10 ML: at 05:06

## 2021-01-01 RX ADMIN — GUAIFENESIN 1200 MG: 600 TABLET, EXTENDED RELEASE ORAL at 22:00

## 2021-01-01 RX ADMIN — HYDROXYZINE PAMOATE 25 MG: 25 CAPSULE ORAL at 07:00

## 2021-01-01 RX ADMIN — DOXYCYCLINE 100 MG: 100 INJECTION, POWDER, LYOPHILIZED, FOR SOLUTION INTRAVENOUS at 05:18

## 2021-01-01 RX ADMIN — GUAIFENESIN AND CODEINE PHOSPHATE 5 ML: 10; 100 LIQUID ORAL at 23:18

## 2021-01-01 RX ADMIN — DILTIAZEM HYDROCHLORIDE 120 MG: 120 CAPSULE, COATED, EXTENDED RELEASE ORAL at 11:05

## 2021-01-01 RX ADMIN — ALPRAZOLAM 0.5 MG: 0.25 TABLET ORAL at 17:01

## 2021-01-01 RX ADMIN — BARICITINIB 4 MG: 2 TABLET, FILM COATED ORAL at 11:55

## 2021-01-01 RX ADMIN — ALBUTEROL SULFATE 2 PUFF: 108 INHALANT RESPIRATORY (INHALATION) at 20:00

## 2021-01-01 RX ADMIN — ALBUTEROL SULFATE 2 PUFF: 108 INHALANT RESPIRATORY (INHALATION) at 08:46

## 2021-01-01 RX ADMIN — DEXAMETHASONE SODIUM PHOSPHATE 4 MG: 10 INJECTION INTRAMUSCULAR; INTRAVENOUS at 22:18

## 2021-01-01 RX ADMIN — PIPERACILLIN SODIUM AND TAZOBACTAM SODIUM 3.38 G: 3; .375 INJECTION, POWDER, LYOPHILIZED, FOR SOLUTION INTRAVENOUS at 22:34

## 2021-01-01 RX ADMIN — ALBUTEROL SULFATE 2 PUFF: 108 INHALANT RESPIRATORY (INHALATION) at 14:29

## 2021-01-01 RX ADMIN — BENZONATATE 200 MG: 100 CAPSULE ORAL at 21:21

## 2021-01-01 RX ADMIN — LORAZEPAM 2 MG: 2 INJECTION INTRAMUSCULAR; INTRAVENOUS at 05:14

## 2021-01-01 RX ADMIN — BENZONATATE 200 MG: 100 CAPSULE ORAL at 22:00

## 2021-01-01 RX ADMIN — METOPROLOL TARTRATE 50 MG: 50 TABLET, FILM COATED ORAL at 08:44

## 2021-01-01 RX ADMIN — LISINOPRIL 5 MG: 5 TABLET ORAL at 09:19

## 2021-01-01 RX ADMIN — OXYCODONE HYDROCHLORIDE AND ACETAMINOPHEN 500 MG: 500 TABLET ORAL at 08:55

## 2021-01-01 RX ADMIN — OXYCODONE HYDROCHLORIDE AND ACETAMINOPHEN 500 MG: 500 TABLET ORAL at 22:18

## 2021-01-01 RX ADMIN — Medication 10 ML: at 05:11

## 2021-01-01 RX ADMIN — BUDESONIDE AND FORMOTEROL FUMARATE DIHYDRATE 2 PUFF: 160; 4.5 AEROSOL RESPIRATORY (INHALATION) at 22:09

## 2021-01-01 RX ADMIN — METOPROLOL TARTRATE 50 MG: 50 TABLET, FILM COATED ORAL at 11:23

## 2021-01-01 RX ADMIN — GUAIFENESIN AND CODEINE PHOSPHATE 5 ML: 10; 100 LIQUID ORAL at 14:30

## 2021-01-01 RX ADMIN — Medication 10 ML: at 22:00

## 2021-01-01 RX ADMIN — GUAIFENESIN 400 MG: 200 SOLUTION ORAL at 00:34

## 2021-01-01 RX ADMIN — ALPRAZOLAM 0.5 MG: 0.25 TABLET ORAL at 07:46

## 2021-01-01 RX ADMIN — METOPROLOL TARTRATE 50 MG: 50 TABLET, FILM COATED ORAL at 22:31

## 2021-01-01 RX ADMIN — DILTIAZEM HYDROCHLORIDE 120 MG: 120 CAPSULE, COATED, EXTENDED RELEASE ORAL at 09:19

## 2021-01-01 RX ADMIN — ENOXAPARIN SODIUM 40 MG: 100 INJECTION SUBCUTANEOUS at 09:06

## 2021-01-01 RX ADMIN — GUAIFENESIN 400 MG: 200 SOLUTION ORAL at 01:37

## 2021-01-01 RX ADMIN — PREDNISONE 20 MG: 20 TABLET ORAL at 17:58

## 2021-01-01 RX ADMIN — ASPIRIN 81 MG CHEWABLE TABLET 81 MG: 81 TABLET CHEWABLE at 09:50

## 2021-01-01 RX ADMIN — Medication 10 ML: at 22:40

## 2021-01-01 RX ADMIN — ALBUTEROL SULFATE 2 PUFF: 108 INHALANT RESPIRATORY (INHALATION) at 22:08

## 2021-01-01 RX ADMIN — Medication 30 MG: at 08:55

## 2021-01-01 RX ADMIN — BUDESONIDE AND FORMOTEROL FUMARATE DIHYDRATE 2 PUFF: 160; 4.5 AEROSOL RESPIRATORY (INHALATION) at 07:13

## 2021-01-01 RX ADMIN — FAMOTIDINE 20 MG: 20 TABLET ORAL at 08:55

## 2021-01-01 RX ADMIN — PRAVASTATIN SODIUM 10 MG: 10 TABLET ORAL at 17:58

## 2021-01-01 RX ADMIN — DOCUSATE SODIUM 100 MG: 50 LIQUID ORAL at 20:01

## 2021-01-01 RX ADMIN — DEXAMETHASONE SODIUM PHOSPHATE 4 MG: 10 INJECTION INTRAMUSCULAR; INTRAVENOUS at 11:17

## 2021-01-01 RX ADMIN — HYDROXYZINE PAMOATE 25 MG: 25 CAPSULE ORAL at 03:13

## 2021-01-01 RX ADMIN — REMDESIVIR 100 MG: 100 INJECTION, POWDER, LYOPHILIZED, FOR SOLUTION INTRAVENOUS at 08:43

## 2021-01-01 RX ADMIN — GUAIFENESIN AND CODEINE PHOSPHATE 5 ML: 10; 100 LIQUID ORAL at 14:51

## 2021-01-01 RX ADMIN — REMDESIVIR 100 MG: 100 INJECTION, POWDER, LYOPHILIZED, FOR SOLUTION INTRAVENOUS at 09:46

## 2021-01-01 RX ADMIN — BUDESONIDE AND FORMOTEROL FUMARATE DIHYDRATE 2 PUFF: 160; 4.5 AEROSOL RESPIRATORY (INHALATION) at 20:41

## 2021-01-01 RX ADMIN — ENOXAPARIN SODIUM 40 MG: 40 INJECTION SUBCUTANEOUS at 10:00

## 2021-01-01 RX ADMIN — DOCUSATE SODIUM 100 MG: 50 LIQUID ORAL at 11:56

## 2021-01-01 RX ADMIN — AMLODIPINE BESYLATE 5 MG: 5 TABLET ORAL at 11:00

## 2021-01-01 RX ADMIN — METOPROLOL TARTRATE 50 MG: 50 TABLET, FILM COATED ORAL at 10:08

## 2021-01-01 RX ADMIN — METOPROLOL TARTRATE 50 MG: 50 TABLET, FILM COATED ORAL at 20:51

## 2021-01-01 RX ADMIN — ASPIRIN 81 MG CHEWABLE TABLET 81 MG: 81 TABLET CHEWABLE at 08:55

## 2021-01-01 RX ADMIN — BARIUM SULFATE 10 ML: 400 SUSPENSION ORAL at 14:00

## 2021-01-01 RX ADMIN — Medication 5 ML: at 22:00

## 2021-01-01 RX ADMIN — ACETAMINOPHEN 650 MG: 325 TABLET ORAL at 22:47

## 2021-01-01 RX ADMIN — FAMOTIDINE 20 MG: 20 TABLET ORAL at 20:17

## 2021-01-01 RX ADMIN — DILTIAZEM HYDROCHLORIDE 120 MG: 120 CAPSULE, COATED, EXTENDED RELEASE ORAL at 10:08

## 2021-01-01 RX ADMIN — HYDROXYZINE PAMOATE 25 MG: 25 CAPSULE ORAL at 13:20

## 2021-01-01 RX ADMIN — METOPROLOL TARTRATE 5 MG: 5 INJECTION, SOLUTION INTRAVENOUS at 18:20

## 2021-01-01 RX ADMIN — Medication 10 ML: at 06:58

## 2021-01-01 RX ADMIN — AZITHROMYCIN MONOHYDRATE 500 MG: 500 TABLET ORAL at 11:22

## 2021-01-01 RX ADMIN — OXYCODONE HYDROCHLORIDE AND ACETAMINOPHEN 500 MG: 500 TABLET ORAL at 23:14

## 2021-01-01 RX ADMIN — ALBUTEROL SULFATE 2 PUFF: 108 INHALANT RESPIRATORY (INHALATION) at 08:32

## 2021-01-01 RX ADMIN — AZITHROMYCIN 500 MG: 500 INJECTION, POWDER, LYOPHILIZED, FOR SOLUTION INTRAVENOUS at 13:35

## 2021-01-01 RX ADMIN — OXYCODONE HYDROCHLORIDE AND ACETAMINOPHEN 500 MG: 500 TABLET ORAL at 20:18

## 2021-01-01 RX ADMIN — ALPRAZOLAM 1 MG: 1 TABLET ORAL at 18:57

## 2021-01-01 RX ADMIN — Medication 10 ML: at 22:36

## 2021-01-01 RX ADMIN — Medication 10 ML: at 05:10

## 2021-01-01 RX ADMIN — ACETAMINOPHEN 650 MG: 325 TABLET ORAL at 18:57

## 2021-01-01 RX ADMIN — PIPERACILLIN SODIUM AND TAZOBACTAM SODIUM 3.38 G: 3; .375 INJECTION, POWDER, LYOPHILIZED, FOR SOLUTION INTRAVENOUS at 13:19

## 2021-01-01 RX ADMIN — ALBUTEROL SULFATE 2 PUFF: 108 INHALANT RESPIRATORY (INHALATION) at 01:39

## 2021-01-01 RX ADMIN — GUAIFENESIN 400 MG: 200 SOLUTION ORAL at 05:38

## 2021-01-01 RX ADMIN — LISINOPRIL 5 MG: 5 TABLET ORAL at 10:23

## 2021-01-01 RX ADMIN — ASPIRIN 81 MG CHEWABLE TABLET 81 MG: 81 TABLET CHEWABLE at 11:21

## 2021-01-01 RX ADMIN — DILTIAZEM HYDROCHLORIDE 120 MG: 120 CAPSULE, COATED, EXTENDED RELEASE ORAL at 08:58

## 2021-01-01 RX ADMIN — Medication 2000 UNITS: at 09:19

## 2021-01-01 RX ADMIN — METHYLPREDNISOLONE SODIUM SUCCINATE 40 MG: 40 INJECTION, POWDER, FOR SOLUTION INTRAMUSCULAR; INTRAVENOUS at 14:15

## 2021-01-01 RX ADMIN — ALBUTEROL SULFATE 2 PUFF: 108 INHALANT RESPIRATORY (INHALATION) at 01:22

## 2021-01-01 RX ADMIN — Medication 10 ML: at 14:31

## 2021-01-01 RX ADMIN — Medication 2000 UNITS: at 10:23

## 2021-01-01 RX ADMIN — BUDESONIDE AND FORMOTEROL FUMARATE DIHYDRATE 2 PUFF: 160; 4.5 AEROSOL RESPIRATORY (INHALATION) at 20:29

## 2021-01-01 RX ADMIN — ENOXAPARIN SODIUM 40 MG: 100 INJECTION SUBCUTANEOUS at 20:01

## 2021-01-01 RX ADMIN — GUAIFENESIN 600 MG: 600 TABLET, EXTENDED RELEASE ORAL at 20:01

## 2021-01-01 RX ADMIN — DOCUSATE SODIUM 100 MG: 50 LIQUID ORAL at 20:51

## 2021-01-01 RX ADMIN — BARICITINIB 4 MG: 2 TABLET, FILM COATED ORAL at 08:01

## 2021-01-01 RX ADMIN — BUDESONIDE AND FORMOTEROL FUMARATE DIHYDRATE 2 PUFF: 160; 4.5 AEROSOL RESPIRATORY (INHALATION) at 07:56

## 2021-01-01 RX ADMIN — THIAMINE HYDROCHLORIDE 100 MG: 100 INJECTION, SOLUTION INTRAMUSCULAR; INTRAVENOUS at 09:00

## 2021-01-01 RX ADMIN — ACETAMINOPHEN 650 MG: 325 TABLET ORAL at 11:00

## 2021-01-01 RX ADMIN — FAMOTIDINE 20 MG: 20 TABLET ORAL at 22:46

## 2021-01-01 RX ADMIN — DEXAMETHASONE SODIUM PHOSPHATE 6 MG: 10 INJECTION INTRAMUSCULAR; INTRAVENOUS at 09:19

## 2021-01-01 RX ADMIN — METOPROLOL TARTRATE 50 MG: 50 TABLET, FILM COATED ORAL at 08:58

## 2021-01-01 RX ADMIN — CEFAZOLIN SODIUM 1 G: 1 INJECTION, POWDER, FOR SOLUTION INTRAMUSCULAR; INTRAVENOUS at 15:22

## 2021-01-01 RX ADMIN — AMLODIPINE BESYLATE 5 MG: 5 TABLET ORAL at 11:22

## 2021-01-01 RX ADMIN — GUAIFENESIN AND CODEINE PHOSPHATE 5 ML: 10; 100 LIQUID ORAL at 03:16

## 2021-01-01 RX ADMIN — OXYCODONE HYDROCHLORIDE AND ACETAMINOPHEN 500 MG: 500 TABLET ORAL at 20:35

## 2021-01-01 RX ADMIN — POLYETHYLENE GLYCOL 3350 17 G: 17 POWDER, FOR SOLUTION ORAL at 11:21

## 2021-01-01 RX ADMIN — Medication 10 ML: at 13:21

## 2021-01-01 RX ADMIN — GUAIFENESIN AND CODEINE PHOSPHATE 5 ML: 10; 100 LIQUID ORAL at 16:57

## 2021-01-01 RX ADMIN — FAMOTIDINE 20 MG: 20 TABLET ORAL at 09:25

## 2021-01-01 RX ADMIN — GUAIFENESIN 400 MG: 200 SOLUTION ORAL at 00:00

## 2021-01-01 RX ADMIN — GUAIFENESIN 1200 MG: 600 TABLET, EXTENDED RELEASE ORAL at 10:58

## 2021-01-01 RX ADMIN — LIDOCAINE HYDROCHLORIDE 60 MG: 20 INJECTION, SOLUTION EPIDURAL; INFILTRATION; INTRACAUDAL; PERINEURAL at 15:22

## 2021-01-01 RX ADMIN — LISINOPRIL 5 MG: 5 TABLET ORAL at 09:27

## 2021-01-01 RX ADMIN — GUAIFENESIN 400 MG: 200 SOLUTION ORAL at 12:00

## 2021-01-01 RX ADMIN — GUAIFENESIN 1200 MG: 600 TABLET, EXTENDED RELEASE ORAL at 09:51

## 2021-01-01 RX ADMIN — Medication 10 ML: at 06:27

## 2021-01-01 RX ADMIN — METOPROLOL TARTRATE 50 MG: 50 TABLET, FILM COATED ORAL at 21:21

## 2021-01-01 RX ADMIN — HYDROXYZINE PAMOATE 25 MG: 25 CAPSULE ORAL at 11:05

## 2021-01-01 RX ADMIN — ACETAMINOPHEN 650 MG: 325 TABLET ORAL at 13:19

## 2021-01-01 RX ADMIN — Medication 30 MG: at 09:00

## 2021-01-01 RX ADMIN — OXYCODONE HYDROCHLORIDE AND ACETAMINOPHEN 500 MG: 500 TABLET ORAL at 22:47

## 2021-01-01 RX ADMIN — FAMOTIDINE 20 MG: 20 TABLET ORAL at 11:20

## 2021-01-01 RX ADMIN — BENZONATATE 200 MG: 100 CAPSULE ORAL at 22:32

## 2021-01-01 RX ADMIN — BARICITINIB 4 MG: 2 TABLET, FILM COATED ORAL at 09:55

## 2021-01-01 RX ADMIN — METOPROLOL TARTRATE 50 MG: 50 TABLET, FILM COATED ORAL at 22:14

## 2021-01-01 RX ADMIN — METOPROLOL TARTRATE 5 MG: 5 INJECTION, SOLUTION INTRAVENOUS at 00:00

## 2021-01-01 RX ADMIN — GUAIFENESIN AND CODEINE PHOSPHATE 5 ML: 10; 100 LIQUID ORAL at 19:57

## 2021-01-01 RX ADMIN — MULTIVITAMIN TABLET 1 TABLET: TABLET at 11:00

## 2021-01-01 RX ADMIN — ACETAMINOPHEN 650 MG: 325 TABLET ORAL at 06:16

## 2021-01-01 RX ADMIN — GUAIFENESIN 1200 MG: 600 TABLET, EXTENDED RELEASE ORAL at 22:16

## 2021-01-01 RX ADMIN — Medication 30 MG: at 20:01

## 2021-01-01 RX ADMIN — ALBUTEROL SULFATE 2 PUFF: 108 INHALANT RESPIRATORY (INHALATION) at 13:08

## 2021-01-01 RX ADMIN — BENZONATATE 200 MG: 100 CAPSULE ORAL at 21:20

## 2021-01-01 RX ADMIN — Medication 10 ML: at 21:00

## 2021-01-01 RX ADMIN — ENOXAPARIN SODIUM 40 MG: 100 INJECTION SUBCUTANEOUS at 20:17

## 2021-01-01 RX ADMIN — MULTIVITAMIN TABLET 1 TABLET: TABLET at 08:55

## 2021-01-01 RX ADMIN — LISINOPRIL 5 MG: 5 TABLET ORAL at 08:02

## 2021-01-01 RX ADMIN — FAMOTIDINE 20 MG: 20 TABLET ORAL at 10:23

## 2021-01-01 RX ADMIN — Medication 2000 UNITS: at 11:19

## 2021-01-01 RX ADMIN — DOXYCYCLINE 100 MG: 100 INJECTION, POWDER, LYOPHILIZED, FOR SOLUTION INTRAVENOUS at 18:34

## 2021-01-01 RX ADMIN — GUAIFENESIN AND CODEINE PHOSPHATE 5 ML: 10; 100 LIQUID ORAL at 06:17

## 2021-01-01 RX ADMIN — ALBUTEROL SULFATE 2 PUFF: 108 INHALANT RESPIRATORY (INHALATION) at 20:41

## 2021-01-01 RX ADMIN — ASPIRIN 81 MG CHEWABLE TABLET 81 MG: 81 TABLET CHEWABLE at 11:00

## 2021-01-01 RX ADMIN — GLYCOPYRROLATE 0.2 MG: 0.2 INJECTION, SOLUTION INTRAMUSCULAR; INTRAVENOUS at 15:22

## 2021-01-01 RX ADMIN — GUAIFENESIN 400 MG: 200 SOLUTION ORAL at 11:51

## 2021-01-01 RX ADMIN — ALBUTEROL SULFATE 2 PUFF: 108 INHALANT RESPIRATORY (INHALATION) at 07:56

## 2021-01-01 RX ADMIN — DEXAMETHASONE SODIUM PHOSPHATE 6 MG: 10 INJECTION INTRAMUSCULAR; INTRAVENOUS at 22:24

## 2021-01-01 RX ADMIN — BUDESONIDE AND FORMOTEROL FUMARATE DIHYDRATE 2 PUFF: 160; 4.5 AEROSOL RESPIRATORY (INHALATION) at 08:26

## 2021-01-01 RX ADMIN — GUAIFENESIN 600 MG: 600 TABLET, EXTENDED RELEASE ORAL at 21:07

## 2021-01-01 RX ADMIN — IPRATROPIUM BROMIDE AND ALBUTEROL SULFATE 3 ML: .5; 2.5 SOLUTION RESPIRATORY (INHALATION) at 08:56

## 2021-01-01 RX ADMIN — FUROSEMIDE 20 MG: 10 INJECTION, SOLUTION INTRAMUSCULAR; INTRAVENOUS at 15:28

## 2021-01-01 RX ADMIN — BARIUM SULFATE 10 ML: 0.81 POWDER, FOR SUSPENSION ORAL at 14:00

## 2021-01-01 RX ADMIN — DOCUSATE SODIUM 100 MG: 50 LIQUID ORAL at 22:35

## 2021-01-01 RX ADMIN — DOXYCYCLINE 100 MG: 100 INJECTION, POWDER, LYOPHILIZED, FOR SOLUTION INTRAVENOUS at 17:55

## 2021-01-01 RX ADMIN — REMDESIVIR 200 MG: 100 INJECTION, POWDER, LYOPHILIZED, FOR SOLUTION INTRAVENOUS at 11:45

## 2021-01-01 RX ADMIN — BARICITINIB 4 MG: 2 TABLET, FILM COATED ORAL at 08:44

## 2021-01-01 RX ADMIN — ALPRAZOLAM 0.5 MG: 0.25 TABLET ORAL at 22:29

## 2021-01-01 RX ADMIN — ALBUTEROL SULFATE 2 PUFF: 108 INHALANT RESPIRATORY (INHALATION) at 19:32

## 2021-01-01 RX ADMIN — ALPRAZOLAM 1 MG: 1 TABLET ORAL at 06:38

## 2021-01-01 RX ADMIN — PIPERACILLIN SODIUM AND TAZOBACTAM SODIUM 3.38 G: 3; .375 INJECTION, POWDER, LYOPHILIZED, FOR SOLUTION INTRAVENOUS at 15:03

## 2021-01-01 RX ADMIN — FAMOTIDINE 20 MG: 20 TABLET ORAL at 22:30

## 2021-01-01 RX ADMIN — HYDROXYZINE PAMOATE 25 MG: 25 CAPSULE ORAL at 06:17

## 2021-01-01 RX ADMIN — AMLODIPINE BESYLATE 5 MG: 5 TABLET ORAL at 08:59

## 2021-01-01 RX ADMIN — HYDROXYZINE PAMOATE 25 MG: 25 CAPSULE ORAL at 10:23

## 2021-01-01 RX ADMIN — ACETAMINOPHEN 650 MG: 325 TABLET ORAL at 01:52

## 2021-01-01 RX ADMIN — OXYCODONE HYDROCHLORIDE AND ACETAMINOPHEN 500 MG: 500 TABLET ORAL at 09:31

## 2021-01-01 RX ADMIN — Medication 30 MG: at 22:33

## 2021-01-01 RX ADMIN — ENOXAPARIN SODIUM 40 MG: 30 INJECTION SUBCUTANEOUS at 08:59

## 2021-01-01 RX ADMIN — ENOXAPARIN SODIUM 40 MG: 100 INJECTION SUBCUTANEOUS at 09:46

## 2021-01-01 RX ADMIN — BUDESONIDE AND FORMOTEROL FUMARATE DIHYDRATE 2 PUFF: 160; 4.5 AEROSOL RESPIRATORY (INHALATION) at 08:33

## 2021-01-01 RX ADMIN — ALBUTEROL SULFATE 2 PUFF: 108 INHALANT RESPIRATORY (INHALATION) at 19:20

## 2021-01-01 RX ADMIN — DEXAMETHASONE SODIUM PHOSPHATE 6 MG: 10 INJECTION INTRAMUSCULAR; INTRAVENOUS at 09:26

## 2021-01-01 RX ADMIN — METHYLPREDNISOLONE SODIUM SUCCINATE 40 MG: 40 INJECTION, POWDER, FOR SOLUTION INTRAMUSCULAR; INTRAVENOUS at 05:06

## 2021-01-01 RX ADMIN — AZITHROMYCIN MONOHYDRATE 500 MG: 500 TABLET ORAL at 08:55

## 2021-01-01 RX ADMIN — BISACODYL 10 MG: 10 SUPPOSITORY RECTAL at 16:58

## 2021-01-01 RX ADMIN — LISINOPRIL 5 MG: 5 TABLET ORAL at 12:01

## 2021-01-01 RX ADMIN — BENZONATATE 200 MG: 100 CAPSULE ORAL at 21:53

## 2021-01-01 RX ADMIN — HYDROXYZINE PAMOATE 25 MG: 25 CAPSULE ORAL at 21:32

## 2021-01-01 RX ADMIN — BUDESONIDE AND FORMOTEROL FUMARATE DIHYDRATE 2 PUFF: 160; 4.5 AEROSOL RESPIRATORY (INHALATION) at 20:00

## 2021-01-01 RX ADMIN — ALPRAZOLAM 0.5 MG: 0.25 TABLET ORAL at 04:07

## 2021-01-01 RX ADMIN — PIPERACILLIN SODIUM AND TAZOBACTAM SODIUM 3.38 G: 3; .375 INJECTION, POWDER, LYOPHILIZED, FOR SOLUTION INTRAVENOUS at 20:20

## 2021-01-01 RX ADMIN — DOXYCYCLINE 100 MG: 100 INJECTION, POWDER, LYOPHILIZED, FOR SOLUTION INTRAVENOUS at 06:56

## 2021-01-01 RX ADMIN — METHYLPREDNISOLONE SODIUM SUCCINATE 40 MG: 40 INJECTION, POWDER, FOR SOLUTION INTRAMUSCULAR; INTRAVENOUS at 05:05

## 2021-01-01 RX ADMIN — DEXAMETHASONE SODIUM PHOSPHATE 6 MG: 10 INJECTION INTRAMUSCULAR; INTRAVENOUS at 08:35

## 2021-01-01 RX ADMIN — FAMOTIDINE 20 MG: 20 TABLET ORAL at 09:19

## 2021-01-01 RX ADMIN — ACETAMINOPHEN 650 MG: 325 TABLET ORAL at 23:13

## 2021-01-01 RX ADMIN — Medication 10 ML: at 21:08

## 2021-01-01 RX ADMIN — DOXYCYCLINE 100 MG: 100 INJECTION, POWDER, LYOPHILIZED, FOR SOLUTION INTRAVENOUS at 06:22

## 2021-01-01 RX ADMIN — METOPROLOL TARTRATE 50 MG: 50 TABLET, FILM COATED ORAL at 12:01

## 2021-01-01 RX ADMIN — GUAIFENESIN 600 MG: 600 TABLET, EXTENDED RELEASE ORAL at 11:05

## 2021-01-01 RX ADMIN — LORAZEPAM 2 MG: 2 INJECTION INTRAMUSCULAR; INTRAVENOUS at 19:14

## 2021-01-01 RX ADMIN — ACETAMINOPHEN 650 MG: 325 TABLET ORAL at 18:24

## 2021-01-01 RX ADMIN — Medication 10 ML: at 13:50

## 2021-01-01 RX ADMIN — HYDROXYZINE PAMOATE 25 MG: 25 CAPSULE ORAL at 11:54

## 2021-01-01 RX ADMIN — DOCUSATE SODIUM 100 MG: 50 LIQUID ORAL at 08:01

## 2021-01-01 RX ADMIN — METOPROLOL TARTRATE 5 MG: 5 INJECTION, SOLUTION INTRAVENOUS at 05:05

## 2021-01-01 RX ADMIN — GUAIFENESIN 1200 MG: 600 TABLET, EXTENDED RELEASE ORAL at 21:53

## 2021-01-01 RX ADMIN — PIPERACILLIN SODIUM AND TAZOBACTAM SODIUM 3.38 G: 3; .375 INJECTION, POWDER, LYOPHILIZED, FOR SOLUTION INTRAVENOUS at 20:01

## 2021-01-01 RX ADMIN — GUAIFENESIN 400 MG: 200 SOLUTION ORAL at 23:31

## 2021-01-01 RX ADMIN — HYDROXYZINE PAMOATE 25 MG: 25 CAPSULE ORAL at 04:35

## 2021-01-01 RX ADMIN — LORAZEPAM 2 MG: 2 INJECTION INTRAMUSCULAR; INTRAVENOUS at 23:28

## 2021-01-01 RX ADMIN — FAMOTIDINE 20 MG: 20 TABLET ORAL at 08:02

## 2021-01-01 RX ADMIN — PIPERACILLIN SODIUM AND TAZOBACTAM SODIUM 3.38 G: 3; .375 INJECTION, POWDER, LYOPHILIZED, FOR SOLUTION INTRAVENOUS at 11:54

## 2021-11-09 PROBLEM — J47.9 BRONCHIECTASIS (HCC): Status: ACTIVE | Noted: 2021-01-01

## 2021-11-09 PROBLEM — J44.1 COPD WITH ACUTE EXACERBATION (HCC): Status: ACTIVE | Noted: 2021-01-01

## 2021-11-09 PROBLEM — I10 HYPERTENSION: Status: ACTIVE | Noted: 2021-01-01

## 2021-11-09 PROBLEM — M81.0 OSTEOPOROSIS: Status: ACTIVE | Noted: 2021-01-01

## 2021-11-09 PROBLEM — J96.01 ACUTE RESPIRATORY FAILURE WITH HYPOXIA (HCC): Status: ACTIVE | Noted: 2021-01-01

## 2021-11-09 PROBLEM — J18.9 COMMUNITY ACQUIRED PNEUMONIA: Status: ACTIVE | Noted: 2021-01-01

## 2021-11-09 PROBLEM — Z72.0 TOBACCO ABUSE: Status: ACTIVE | Noted: 2021-01-01

## 2021-11-09 NOTE — ACP (ADVANCE CARE PLANNING)
Advance Care Planning   Healthcare Decision Maker:       Primary Decision Maker: Lauren Haley - Daughter - [phone]

## 2021-11-09 NOTE — CONSULTS
PULMONARY CONSULT  [facility]    Name: Mariajose Lugo MRN: [id number]   : 1945 Hospital: [address] [place] [address]   Date: 2021  Admission date: 2021 Hospital Day: 1       HPI:     Hospital Problems  Date Reviewed: 2018          Codes Class Noted POA    COPD with acute exacerbation ([facility] ICD-10-CM: J44.1  ICD-9-CM: 491.21  2021 Unknown        Acute respiratory failure with hypoxia [facility]) ICD-10-CM: J96.01  ICD-9-CM: 518.81  2021 Unknown        Hypertension ICD-10-CM: I10  ICD-9-CM: 401.9  2021 Unknown        Tobacco abuse ICD-10-CM: Z72.0  ICD-9-CM: 305.1  2021 Unknown        Osteoporosis ICD-10-CM: M81.0  ICD-9-CM: 733.00  2021 Unknown        Community acquired pneumonia ICD-10-CM: J18.9  ICD-9-CM: 516  2021 Unknown        Bronchiectasis ([facility] ICD-10-CM: J47.9  ICD-9-CM: 494.0  2021 Unknown                   [x] High complexity decision making was performed  [x] See my orders for details      Subjective/Initial History:     I was asked by Jose Hinson MD to see Mariajose Lugo  a 77 y.o.  female in consultation     Excerpts from admission 2021 or consult notes as follows:   78-year-old lady came in because of shortness of breath and dyspnea she has significant past medical history of COPD hypertension and osteoporosis she is a chronic smoker quit smoking this morning before coming to the hospital. She was complaining about dyspnea and cough seen by primary care physician recommended prednisone did not seem to help saturation was in the 80s she was put on oxygen 5 with a nasal cannula she is not on any oxygen at home CAT scan of the chest was done which shows right middle lobe atelectasis and 3 mm left upper lobe lung nodule negative, embolism so admitted and pulmonary consult was called for further evaluation.       No Known Allergies     MAR reviewed and pertinent medications noted or modified as needed Current Facility-Administered Medications   Medication    sodium chloride (NS) flush 5-40 mL    sodium chloride (NS) flush 5-40 mL    acetaminophen (TYLENOL) tablet 650 mg    Or    acetaminophen (TYLENOL) suppository 650 mg    polyethylene glycol (MIRALAX) packet 17 g    bisacodyL (DULCOLAX) tablet 5 mg    ondansetron (ZOFRAN ODT) tablet 4 mg    Or    ondansetron (ZOFRAN) injection 4 mg    famotidine (PEPCID) tablet 20 mg    [START ON 11/10/2021] enoxaparin (LOVENOX) injection 40 mg    [START ON 11/10/2021] nicotine (NICODERM CQ) 7 mg/24 hr patch 1 Patch    guaiFENesin ER (MUCINEX) tablet 1,200 mg    benzonatate (TESSALON) capsule 200 mg    [START ON 11/10/2021] azithromycin (ZITHROMAX) tablet 500 mg    [START ON 11/10/2021] thiamine mononitrate (B-1) tablet 100 mg    [START ON 11/10/2021] multivitamin with folic acid (ONE DAILY WITH FOLIC ACID) tablet 1 Tablet    [START ON 11/10/2021] tiotropium bromide (SPIRIVA RESPIMAT) 2.5 mcg /actuation    budesonide-formoteroL (SYMBICORT) 160-4.5 mcg/actuation HFA inhaler 2 Puff    methylPREDNISolone (PF) (SOLU-MEDROL) injection 40 mg     Current Outpatient Medications   Medication Sig    ibandronate (BONIVA) 150 mg tablet     hydroCHLOROthiazide (HYDRODIURIL) 12.5 mg tablet Take 12.5 mg by mouth daily.  metoprolol tartrate (LOPRESSOR) 50 mg tablet Take  by mouth two (2) times a day.  amLODIPine (NORVASC) 5 mg tablet Take 5 mg by mouth daily.  lovastatin (MEVACOR) 10 mg tablet Take  by mouth nightly.  ergocalciferol (VITAMIN D2) 50,000 unit capsule Take 50,000 Units by mouth.  ASPIRIN PO Take  by mouth. Patient PCP: Lea Rivas MD  PMH:  has a past medical history of COPD (chronic obstructive pulmonary disease) ([address] Essential hypertension, Family history of skin cancer, Osteoporosis, and Tobacco abuse. PSH:   has a past surgical history that includes hx mohs procedure (02/28/2018).    FHX: family history includes Hypertension in her mother. SHX:  reports that she has been smoking. She started smoking about 60 years ago. She has a 60.00 pack-year smoking history. She has never used smokeless tobacco.     ROS:    Review of Systems   Constitutional: Positive for malaise/fatigue and weight loss. HENT: Negative. Eyes: Negative. Respiratory: Positive for cough, sputum production, shortness of breath and wheezing. Cardiovascular: Positive for chest pain and orthopnea. Gastrointestinal: Negative. Genitourinary: Negative. Musculoskeletal: Negative. Skin: Negative. Neurological: Negative. Psychiatric/Behavioral: Negative. Objective:     Vital Signs: Telemetry:    normal sinus rhythm Intake/Output:   Visit Vitals  BP (!) 142/84 (BP 1 Location: Left upper arm, BP Patient Position: Semi fowlers)   Pulse (!) 109   Temp 98.3 °F (36.8 °C)   Resp 24   Ht 5' 4\" (1.626 m)   Wt 43.5 kg (96 lb)   SpO2 91% Comment: 4l o2   BMI 16.48 kg/m²       Temp (24hrs), Av.4 °F (36.9 °C), Min:98.3 °F (36.8 °C), Max:98.5 °F (36.9 °C)        O2 Device: Nasal cannula O2 Flow Rate (L/min): 4 l/min       Wt Readings from Last 4 Encounters:   21 43.5 kg (96 lb)   21 45.4 kg (100 lb)        No intake or output data in the 24 hours ending 21 1657    Last shift:      No intake/output data recorded. Last 3 shifts: No intake/output data recorded. Physical Exam:     Physical Exam  Constitutional:       Appearance: Normal appearance. She is ill-appearing and diaphoretic. HENT:      Head: Normocephalic and atraumatic. Nose: Nose normal.      Mouth/Throat:      Mouth: Mucous membranes are moist.   Eyes:      Pupils: Pupils are equal, round, and reactive to light. Cardiovascular:      Rate and Rhythm: Normal rate and regular rhythm. Pulses: Normal pulses. Pulmonary:      Effort: Respiratory distress present. Breath sounds: Wheezing and rhonchi present. Abdominal:      General: Abdomen is flat. Bowel sounds are normal.      Palpations: Abdomen is soft. Musculoskeletal:         General: Normal range of motion. Cervical back: Normal range of motion and neck supple. Skin:     General: Skin is warm. Neurological:      General: No focal deficit present. Mental Status: She is alert. Psychiatric:         Mood and Affect: Mood normal.          Labs:    Recent Labs     11/09/21  0719   WBC 11.8*   HGB 14.4   *     Recent Labs     11/09/21  0719      K 3.6      CO2 28   *   BUN 48*   CREA 0.90   CA 10.3*   ALB 3.8   ALT 20     No results for input(s): PH, PCO2, PO2, HCO3, FIO2 in the last 72 hours. No results for input(s): CPK, CKNDX, TROIQ in the last 72 hours. No lab exists for component: CPKMB  No results found for: BNPP, BNP   No results found for: CULTNo results found for: TSH, TSHEXT    Imaging:    CXR Results  (Last 48 hours)               11/09/21 0832  XR CHEST PORT Final result    Narrative:  Chest single view. Coarse reticulonodular markings through lungs. Less than 1 cm nodular density   right lung base. No gross interstitial or alveolar pulmonary edema. Cardiac   silhouette within normal limits. Elongated thoracic aorta with atherosclerotic   change. No pneumothorax or sizable pleural effusion. CT chest findings 3/4/2021 noted. Consider CT follow-up to demonstrate stability. Results from [place] encounter on 11/09/21    XR CHEST PORT    Narrative  Chest single view. Coarse reticulonodular markings through lungs. Less than 1 cm nodular density  right lung base. No gross interstitial or alveolar pulmonary edema. Cardiac  silhouette within normal limits. Elongated thoracic aorta with atherosclerotic  change. No pneumothorax or sizable pleural effusion. CT chest findings 3/4/2021 noted. Consider CT follow-up to demonstrate stability.     Results from [place] encounter on 11/09/21    CTA CHEST W OR W WO CONT    Narrative  CTA chest.    Axial images are reviewed along with reformatted sagittal/coronal/MIP images. 100 mL Isovue 370 administered. Dose reduction: All CT scans at this facility are performed using dose reduction  optimization techniques as appropriate to a performed exam including the  following-  automated exposure control, adjustments of mA and/or Kv according to patient  size, or use of iterative reconstructive technique. Apical pleural-parenchymal change. Emphysema. 3 mm lingular LINA nodule. Focal RML atelectasis; this likely accounts for recent plain film finding. Minor posteromedial basilar subsegmental atelectasis. Enhanced images reveal advanced atherosclerotic change thoracic aorta with  extension and great vessels. Normal caliber pulmonary arterial trunk. Normal  contrast opacification pulmonary arterial trunk and its branch vessels; no CT  evidence for PE. Central dependent bronchi are incompletely aerated suggesting mucous content. No  mediastinal lymphadenopathy. No pericardial effusion. Atherosclerosis continues into the imaged upper abdomen. Impression  Emphysema. RML focal atelectasis. Minor posteromedial basilar subsegmental atelectasis. 3 mm LINA nodule. No CT evidence for PE. Thoracoabdominal aorta atherosclerosis. Likely mucous central dependent bronchi. IMPRESSION:   1. Acute hypoxic respiratory failure  2. Chronic Obstructive Pulmonary Disease with Severe Acute Exacerbation requiring inpatient hospitalization and management; has very poor airway clearance. Increased work of breathing  3. Body mass index is 16.48 kg/m². 4. Left upper lobe 3 mm lung nodule  5. Bronchiectasis  6. Community-acquired pneumonia  7. COPD cachexia  8. Pt is requiring Drug therapy requiring intensive monitoring for toxicity  9.  Pt is unstable, unpredictable needing inpatient monitoring; is acutely ill and at high risk of sudden decline and decompensation with severe consequenses and continued end organ dysfunction and failure  10. Prognosis guarded       RECOMMENDATIONS/PLAN:   1. On 5 liter  nasal Cannula oxygen as salvage oxygen delivery device to provide high concentration of oxygen to overcome refractory hypoxia;  2. Will start patient IV Solu-Medrol nebulizer treatment  3. Start wean oxygen per protocol  4. Patient on Zithromax I will add Zosyn  5. Nebulizer treatment along with Symbicort and Spiriva  6. Intubate and place on vent if NIV fails  7. Supplemental O2 to keep sats > 93%  8. Aspiration precautions  9. Labs to follow electrolytes, renal function and and blood counts  10. Glucose monitoring and SSI  11. Bronchial hygiene with respiratory therapy techniques, bronchodilators  12. DVT, SUP prophylaxis  13. Smoking cessation counseling done  14. Pt needs IV fluids with additives and Drug therapy requiring intensive monitoring for toxicity  15. Prescription drug management with home med reconciliation reviewed       This care involved high complexity medical decision making: I personally:  · Reviewed the flowsheet and previous days notes  · Reviewed and summarized records or history from previous days note or discussions with staff, family  · High Risk Drug therapy requiring intensive monitoring for toxicity: eg steroids, pressors, antibiotics  · Reviewed and/or ordered Clinical lab tests  · Reviewed images and/or ordered Radiology tests  · Reviewed the patients ECG / Telemetry  · Reviewed and/or adjusted NiPPV settings  · Called and arranged for Radiologic procedures or interventions  · performed or ordered Diagnostic endoscopies with identified risk factors.   · discussed my assessment/management with : Nursing, Hospitalist and Family for coordination of care          Lionel Perkins MD

## 2021-11-09 NOTE — H&P
History and Physical    Patient: Ruchi Rizzo MRN: [id number]  SSN: [id number]    YOB: 1945  Age: 77 y.o. Sex: female      Subjective:      Ruchi Rizzo is a 77 y.o. female who has PMH significant for HTN, COPD, osteoporosis and chronic smoker. Brought into the emergency room for progressive shortness of breath with associated cough and weakness. Diagnosed by PCP 1 week PTA with bronchitis and placed on prednisone. Symptoms not improving. In the emergency room she is hypoxic on room air and placed on 5 L nasal cannula for saturations of 91%. She is then, frail, failure to thrive malnourished. She lives with family otherwise independent of ADLs. CT of chest revealing right middle lobe focal atelectasis, segmental basilar atelectasis, mucus likely central dependent bronchi, 3 mm left upper lobe nodule. Negative for PE. Significant labs on admission , calcium 10.3, WBC 11.8, D-dimer 1.03. Will test for COVID-19. Will admit for further management respiratory failure with hypoxia, COPD exacerbation, pneumonia and bronchiectasis. Will consult pulmonary for recommendations. Will start on azithromycin, folic acid, thiamine, IV Solu-Medrol, Symbicort and Spiriva, Mucinex and Tessalon.           Past Medical History:   Diagnosis Date    COPD (chronic obstructive pulmonary disease) ([address]     Essential hypertension     Family history of skin cancer     Osteoporosis     Tobacco abuse      Past Surgical History:   Procedure Laterality Date    HX MOHS PROCEDURES  02/28/2018    BCC L lateral forehead by Dr. Aparna Palomares       Family History   Problem Relation Age of Onset    Hypertension Mother      Social History     Tobacco Use    Smoking status: Current Every Day Smoker     Packs/day: 1.00     Years: 60.00     Pack years: 60.00     Start date: 3/4/1961    Smokeless tobacco: Never Used   Substance Use Topics    Alcohol use: Not on file      Prior to Admission medications    Medication Sig Start Date End Date Taking? Authorizing Provider   ibandronate (BONIVA) 150 mg tablet  2/12/18  Yes Provider, Historical   hydroCHLOROthiazide (HYDRODIURIL) 12.5 mg tablet Take 12.5 mg by mouth daily. Yes Provider, Historical   metoprolol tartrate (LOPRESSOR) 50 mg tablet Take  by mouth two (2) times a day. Yes Provider, Historical   amLODIPine (NORVASC) 5 mg tablet Take 5 mg by mouth daily. Yes Provider, Historical   lovastatin (MEVACOR) 10 mg tablet Take  by mouth nightly. Yes Provider, Historical   ergocalciferol (VITAMIN D2) 50,000 unit capsule Take 50,000 Units by mouth. Yes Provider, Historical   ASPIRIN PO Take  by mouth. Yes Provider, Historical        No Known Allergies    Review of Systems   Constitutional: Positive for malaise/fatigue. HENT: Positive for congestion. Respiratory: Positive for cough, sputum production and shortness of breath. Cardiovascular: Negative for chest pain. Gastrointestinal: Negative for abdominal pain. Musculoskeletal: Positive for back pain. Neurological: Positive for weakness. Objective:     Vitals:    11/09/21 1221 11/09/21 1238 11/09/21 1418 11/09/21 1455   BP:  (!) 152/65  (!) 142/84   Pulse:  99  (!) 109   Resp:    24   Temp:       SpO2: 91% 90% (!) 86% 91%   Weight:       Height:            Physical Exam  Constitutional:       Appearance: She is ill-appearing. Comments: Cachectic   HENT:      Nose: Congestion present. Cardiovascular:      Rate and Rhythm: Regular rhythm. Tachycardia present. Pulmonary:      Effort: Respiratory distress present. Breath sounds: Rhonchi present. Abdominal:      General: There is no distension. Musculoskeletal:      Right lower leg: No edema. Left lower leg: No edema. Skin:     Findings: Lesion present. Comments: Large bridge of the nose actinic keratosis  Pale grayish   Neurological:      Mental Status: She is oriented to person, place, and time. Motor: Weakness present. Recent Results (from the past 24 hour(s))   METABOLIC PANEL, COMPREHENSIVE    Collection Time: 11/09/21  7:19 AM   Result Value Ref Range    Sodium 140 136 - 145 mmol/L    Potassium 3.6 3.5 - 5.1 mmol/L    Chloride 104 97 - 108 mmol/L    CO2 28 21 - 32 mmol/L    Anion gap 8 5 - 15 mmol/L    Glucose 134 (H) 65 - 100 mg/dL    BUN 48 (H) 6 - 20 mg/dL    Creatinine 0.90 0.55 - 1.02 mg/dL    BUN/Creatinine ratio 53 (H) 12 - 20      GFR est AA >60 >60 ml/min/1.73m2    GFR est non-AA >60 >60 ml/min/1.73m2    Calcium 10.3 (H) 8.5 - 10.1 mg/dL    Bilirubin, total 0.7 0.2 - 1.0 mg/dL    AST (SGOT) 21 15 - 37 U/L    ALT (SGPT) 20 12 - 78 U/L    Alk. phosphatase 72 45 - 117 U/L    Protein, total 7.5 6.4 - 8.2 g/dL    Albumin 3.8 3.5 - 5.0 g/dL    Globulin 3.7 2.0 - 4.0 g/dL    A-G Ratio 1.0 (L) 1.1 - 2.2     CBC WITH AUTOMATED DIFF    Collection Time: 11/09/21  7:19 AM   Result Value Ref Range    WBC 11.8 (H) 3.6 - 11.0 K/uL    RBC 4.69 3.80 - 5.20 M/uL    HGB 14.4 11.5 - 16.0 g/dL    HCT 43.0 35.0 - 47.0 %    MCV 91.7 80.0 - 99.0 FL    MCH 30.7 26.0 - 34.0 PG    MCHC 33.5 30.0 - 36.5 g/dL    RDW 14.8 (H) 11.5 - 14.5 %    PLATELET 357 (H) 937 - 400 K/uL    MPV 10.3 8.9 - 12.9 FL    NRBC 0.0 0.0  WBC    ABSOLUTE NRBC 0.00 0.00 - 0.01 K/uL    NEUTROPHILS 66 32 - 75 %    LYMPHOCYTES 25 12 - 49 %    MONOCYTES 8 5 - 13 %    EOSINOPHILS 1 0 - 7 %    BASOPHILS 0 0 - 1 %    IMMATURE GRANULOCYTES 0 0 - 0.5 %    ABS. NEUTROPHILS 7.7 1.8 - 8.0 K/UL    ABS. LYMPHOCYTES 3.0 0.8 - 3.5 K/UL    ABS. MONOCYTES 0.9 0.0 - 1.0 K/UL    ABS. EOSINOPHILS 0.1 0.0 - 0.4 K/UL    ABS. BASOPHILS 0.1 0.0 - 0.1 K/UL    ABS. IMM.  GRANS. 0.0 0.00 - 0.04 K/UL    DF AUTOMATED     NT-PRO BNP    Collection Time: 11/09/21  7:19 AM   Result Value Ref Range    NT pro- <450 pg/mL   EKG, 12 LEAD, INITIAL    Collection Time: 11/09/21  7:35 AM   Result Value Ref Range    Ventricular Rate 97 BPM    Atrial Rate 97 BPM P-R Interval 132 ms    QRS Duration 78 ms    Q-T Interval 358 ms    QTC Calculation (Bezet) 454 ms    Calculated P Axis 81 degrees    Calculated R Axis 68 degrees    Calculated T Axis 81 degrees    Diagnosis       Sinus rhythm with Premature atrial complexes  Left ventricular hypertrophy with repolarization abnormality  Abnormal ECG  No previous ECGs available  Confirmed by Harrison Loving MD, Aarti Rivas ([id number]) on 11/9/2021 8:29:42 AM     TROPONIN-HIGH SENSITIVITY    Collection Time: 11/09/21  8:05 AM   Result Value Ref Range    Troponin-High Sensitivity 34 0 - 51 ng/L   D DIMER    Collection Time: 11/09/21 10:21 AM   Result Value Ref Range    D DIMER 1.03 (H) <0.50 ug/ml(FEU)   TROPONIN-HIGH SENSITIVITY    Collection Time: 11/09/21  2:29 PM   Result Value Ref Range    Troponin-High Sensitivity 43 0 - 51 ng/L       XR Results (maximum last 3): Results from [place] encounter on 11/09/21    XR CHEST PORT    Narrative  Chest single view. Coarse reticulonodular markings through lungs. Less than 1 cm nodular density  right lung base. No gross interstitial or alveolar pulmonary edema. Cardiac  silhouette within normal limits. Elongated thoracic aorta with atherosclerotic  change. No pneumothorax or sizable pleural effusion. CT chest findings 3/4/2021 noted. Consider CT follow-up to demonstrate stability. CT Results (maximum last 3): Results from [place] encounter on 11/09/21    CTA CHEST W OR W WO CONT    Narrative  CTA chest.    Axial images are reviewed along with reformatted sagittal/coronal/MIP images. 100 mL Isovue 370 administered. Dose reduction: All CT scans at this facility are performed using dose reduction  optimization techniques as appropriate to a performed exam including the  following-  automated exposure control, adjustments of mA and/or Kv according to patient  size, or use of iterative reconstructive technique. Apical pleural-parenchymal change. Emphysema.   3 mm lingular LINA nodule. Focal RML atelectasis; this likely accounts for recent plain film finding. Minor posteromedial basilar subsegmental atelectasis. Enhanced images reveal advanced atherosclerotic change thoracic aorta with  extension and great vessels. Normal caliber pulmonary arterial trunk. Normal  contrast opacification pulmonary arterial trunk and its branch vessels; no CT  evidence for PE. Central dependent bronchi are incompletely aerated suggesting mucous content. No  mediastinal lymphadenopathy. No pericardial effusion. Atherosclerosis continues into the imaged upper abdomen. Impression  Emphysema. RML focal atelectasis. Minor posteromedial basilar subsegmental atelectasis. 3 mm LINA nodule. No CT evidence for PE. Thoracoabdominal aorta atherosclerosis. Likely mucous central dependent bronchi. Results from [place] encounter on 03/04/21    CT LOW DOSE LUNG CANCER SCREENING    Narrative  CT chest, 3/4/2021    History: Low dose CT lung screening. Current smoker. Nicotine dependence  cigarettes. Comparison: Limited images from the CT chest 10/22/2018. Other CT chest studies  not electronically accessible. Technique: No intravenous contrast was administered. Multiple contiguous axial  images were acquired from the thoracic inlet to the diaphragm using a low dose  screening protocol. Coronal, sagittal and MIP reconstruction of images was  made. All CT scans at this facility are performed using dose reduction optimization  techniques as appropriate to perform the exam including the following: Automated  exposure control, adjustments of the mA and/or kV according to patient size, or  use iterative reconstruction technique. CTDI 2.284 mGy. DLP 95.4 mGy*cm. Findings: The included thyroid gland has no focal abnormality.     There is a superficial, subcutaneous, hyperattenuating density in the left lower  axilla/lateral chest wall measuring 1.8 cm x 0.8 cm, possibly a sebaceous cyst.  Lymphadenopathy is less likely. This was reported and had similar measurements  on CT chest 10/22/2018. Images for this part of the chest are not  electronically accessible for direct comparison. There is no gross evidence of mediastinal or hilar lymphadenopathy on this  noncontrast enhanced study. The heart size is within normal limits. Coronary artery calcifications are  present. No significant pericardial effusion is seen. The thoracic aorta is  normal in caliber and has atherosclerotic plaque. The main pulmonary artery is  normal in caliber. There is density in the trachea, probably mucus or debris. The lungs are hyperexpanded with centrilobular emphysema. Apical pleural  thickening is noted. No focal consolidation, pleural effusion or pneumothorax  is identified. Solid juxtapleural nodules in the left lower lobe measuring 4 mm laterally and 2  mm posteriorly are benign-appearing and stable as compared to CT chest  10/22/2018. Degenerative changes are seen in the thoracic spine. The included liver, pancreas, kidneys demonstrate no focal abnormality on this  noncontrast enhanced study. The adrenal glands and spleen image normally. Impression  No specific CT evidence of malignancy. Lung RADS category: 2  Management as per ACR guidelines: Continue annual screening with low dose CT in  12 months. Other findings as above. MRI Results (maximum last 3): No results found for this or any previous visit. Nuclear Medicine Results (maximum last 3): No results found for this or any previous visit. US Results (maximum last 3): No results found for this or any previous visit.       Active Problems:    COPD with acute exacerbation ([address] (11/9/2021)      Acute respiratory failure with hypoxia ([address] (11/9/2021)      Hypertension (11/9/2021)      Tobacco abuse (11/9/2021)      Osteoporosis (11/9/2021)      Community acquired pneumonia (11/9/2021)      Bronchiectasis [facility]) (11/9/2021)        Assessment/Plan:     Pneumonia community-acquired  Bronchiectasis  COPD exacerbation  Respiratory failure with hypoxia  Test for COVID-19 pending  Chest CT negative for PE, positive for RML focal atelectasis, segmental basilar atelectasis, mucus likely in central dependent bronchi and 3 mm LINA nodule  Chronic smoker, not on home inhalers  We will start on Zithromax, IV Solu-Medrol, Mucinex and Tessalon  We will start Symbicort and Spiriva neb  Consult pulmonary    Hypertension  Continue home medication metoprolol 50 mg twice daily, amlodipine 5 mg/day    Osteoporosis with chronic back pain  Continue home medication vitamin D3, Boniva    Tobacco abuse  Counseled concerning risks associated with continued tobacco abuse  Initiated nicotine replacement therapy    Failure to thrive  Malnutritionsevere  We will consult nutrition  Add folic acid and thiamine        DVT Prophylaxis: Lovenox  Code Status: Full code  POA/NOK: Daughters  Total Time spent in direct and indirect care including assessment review of labs and coordination of services and consultations: Greater than 75 minutes      Signed By: Chacho Sousa NP     November 9, 2021

## 2021-11-09 NOTE — ED PROVIDER NOTES
EMERGENCY DEPARTMENT HISTORY AND PHYSICAL EXAM      Date: 11/9/2021  Patient Name: Beto Palomino      History of Presenting Illness     Chief Complaint   Patient presents with    Shortness of Breath       History Provided By: Patient    HPI: Beto Palomino, 77 y.o. female with PMH of HTN and COPD who presents emerged department for shortness of breath and coughing that started this morning. Symptoms are of moderate severity, no known triggers, worsening or relieving factors associated with whitish colored sputum. There is no chest pain, fever, chills, runny nose, nasal congestion, dyspnea, ageusia, nausea, vomiting, abdominal pain, or chest pain. There is no lower extremity swelling or pain. Patient was noted to be hypoxic and was placed on oxygen. Upon arrival to emergency department patient oxygen saturation is 91 on 5 L. Was seen by her doctor last week and was diagnosed bronchitis and was placed on prednisone. Currently she is not taking any inhalers. There are no other complaints, changes, or physical findings at this time.     PCP: Krysta Nunez MD    Current Facility-Administered Medications   Medication Dose Route Frequency Provider Last Rate Last Admin    sodium chloride (NS) flush 5-40 mL  5-40 mL IntraVENous Q8H Rajiv Calderon NP        sodium chloride (NS) flush 5-40 mL  5-40 mL IntraVENous PRN Rajiv Mancera NP        acetaminophen (TYLENOL) tablet 650 mg  650 mg Oral Q6H PRN Rajiv Mancera NP        Or   [address] [place] [address] acetaminophen (TYLENOL) suppository 650 mg  650 mg Rectal Q6H PRN Rajiv Mancera NP        polyethylene glycol (MIRALAX) packet 17 g  17 g Oral DAILY PRN Rajiv Mancera NP        bisacodyL (DULCOLAX) tablet 5 mg  5 mg Oral DAILY PRN Rajiv Mancera NP        ondansetron (ZOFRAN ODT) tablet 4 mg  4 mg Oral Q6H PRN Rajiv Mancera NP        Or    ondansetron [facility]) injection 4 mg  4 mg IntraVENous Q6H PRN Rajiv Mancera NP        famotidine (PEPCID) tablet 20 mg  20 mg Oral BID SHIRA Godoy [START ON 11/10/2021] enoxaparin (LOVENOX) injection 40 mg  40 mg SubCUTAneous DAILY SHIRA Godoy [START ON 11/10/2021] nicotine (NICODERM CQ) 7 mg/24 hr patch 1 Patch  1 Patch TransDERmal DAILY Kody Santos NP        guaiFENesin ER [facility]) tablet 1,200 mg  1,200 mg Oral Q12H Kody Santos NP        benzonatate (TESSALON) capsule 200 mg  200 mg Oral TID SHIRA Godoy Aretta Duffel ON 11/10/2021] azithromycin (ZITHROMAX) tablet 500 mg  500 mg Oral DAILY SHIRA Godoy [START ON 11/10/2021] thiamine mononitrate (B-1) tablet 100 mg  100 mg Oral DAILY SHIRA Godoy [START ON 11/10/2021] multivitamin with folic acid (ONE DAILY WITH FOLIC ACID) tablet 1 Tablet  1 Tablet Oral DAILY SHIRA Godoy [START ON 11/10/2021] tiotropium bromide (SPIRIVA RESPIMAT) 2.5 mcg /actuation  2 Puff Inhalation DAILY Kody Santos NP        budesonide-formoteroL (SYMBICORT) 160-4.5 mcg/actuation HFA inhaler 2 Puff  2 Puff Inhalation BID RT Kody Santos NP        methylPREDNISolone (PF) (SOLU-MEDROL) injection 40 mg  40 mg IntraVENous Q8H Kody Santos NP         Current Outpatient Medications   Medication Sig Dispense Refill    ibandronate (BONIVA) 150 mg tablet       hydroCHLOROthiazide (HYDRODIURIL) 12.5 mg tablet Take 12.5 mg by mouth daily.  metoprolol tartrate (LOPRESSOR) 50 mg tablet Take  by mouth two (2) times a day.  amLODIPine (NORVASC) 5 mg tablet Take 5 mg by mouth daily.  lovastatin (MEVACOR) 10 mg tablet Take  by mouth nightly.  ergocalciferol (VITAMIN D2) 50,000 unit capsule Take 50,000 Units by mouth.  ASPIRIN PO Take  by mouth.          Past History     Past Medical History:  Past Medical History:   Diagnosis Date    COPD (chronic obstructive pulmonary disease) ([facility] [address]     Essential hypertension     Family history of skin cancer     Osteoporosis  Tobacco abuse        Past Surgical History:  Past Surgical History:   Procedure Laterality Date    HX MOHS PROCEDURES  02/28/2018    BCC L lateral forehead by Dr. Reena Rodriguez        Family History:  Family History   Problem Relation Age of Onset    Hypertension Mother        Social History:  Social History     Tobacco Use    Smoking status: Current Every Day Smoker     Packs/day: 1.00     Years: 60.00     Pack years: 60.00     Start date: 3/4/1961    Smokeless tobacco: Never Used   Substance Use Topics    Alcohol use: Not on file    Drug use: Not on file       Allergies:  No Known Allergies      Review of Systems     Review of Systems   Constitutional: Negative for activity change and fever. HENT: Negative for congestion and facial swelling. Eyes: Negative for discharge and visual disturbance. Respiratory: Positive for cough and shortness of breath. Negative for chest tightness. Cardiovascular: Negative for chest pain and leg swelling. Gastrointestinal: Negative for abdominal pain and vomiting. Genitourinary: Negative for dysuria and flank pain. Musculoskeletal: Negative for back pain and gait problem. Skin: Negative for rash and wound. Neurological: Negative for dizziness and weakness. Physical Exam     Physical Exam  Constitutional:       Appearance: She is underweight. She is not ill-appearing. HENT:      Head: Normocephalic and atraumatic. Eyes:      Extraocular Movements: Extraocular movements intact. Pupils: Pupils are equal, round, and reactive to light. Cardiovascular:      Rate and Rhythm: Normal rate and regular rhythm. Pulmonary:      Effort: Tachypnea present. No accessory muscle usage. Breath sounds: Wheezing present. Musculoskeletal:      Right lower leg: No tenderness. No edema. Left lower leg: No tenderness. No edema. Skin:     General: Skin is warm and dry.    Neurological:      Mental Status: She is alert and oriented to person, place, and time.         Lab and Diagnostic Study Results     Labs -     Recent Results (from the past 12 hour(s))   METABOLIC PANEL, COMPREHENSIVE    Collection Time: 11/09/21  7:19 AM   Result Value Ref Range    Sodium 140 136 - 145 mmol/L    Potassium 3.6 3.5 - 5.1 mmol/L    Chloride 104 97 - 108 mmol/L    CO2 28 21 - 32 mmol/L    Anion gap 8 5 - 15 mmol/L    Glucose 134 (H) 65 - 100 mg/dL    BUN 48 (H) 6 - 20 mg/dL    Creatinine 0.90 0.55 - 1.02 mg/dL    BUN/Creatinine ratio 53 (H) 12 - 20      GFR est AA >60 >60 ml/min/1.73m2    GFR est non-AA >60 >60 ml/min/1.73m2    Calcium 10.3 (H) 8.5 - 10.1 mg/dL    Bilirubin, total 0.7 0.2 - 1.0 mg/dL    AST (SGOT) 21 15 - 37 U/L    ALT (SGPT) 20 12 - 78 U/L    Alk. phosphatase 72 45 - 117 U/L    Protein, total 7.5 6.4 - 8.2 g/dL    Albumin 3.8 3.5 - 5.0 g/dL    Globulin 3.7 2.0 - 4.0 g/dL    A-G Ratio 1.0 (L) 1.1 - 2.2     CBC WITH AUTOMATED DIFF    Collection Time: 11/09/21  7:19 AM   Result Value Ref Range    WBC 11.8 (H) 3.6 - 11.0 K/uL    RBC 4.69 3.80 - 5.20 M/uL    HGB 14.4 11.5 - 16.0 g/dL    HCT 43.0 35.0 - 47.0 %    MCV 91.7 80.0 - 99.0 FL    MCH 30.7 26.0 - 34.0 PG    MCHC 33.5 30.0 - 36.5 g/dL    RDW 14.8 (H) 11.5 - 14.5 %    PLATELET 863 (H) 936 - 400 K/uL    MPV 10.3 8.9 - 12.9 FL    NRBC 0.0 0.0  WBC    ABSOLUTE NRBC 0.00 0.00 - 0.01 K/uL    NEUTROPHILS 66 32 - 75 %    LYMPHOCYTES 25 12 - 49 %    MONOCYTES 8 5 - 13 %    EOSINOPHILS 1 0 - 7 %    BASOPHILS 0 0 - 1 %    IMMATURE GRANULOCYTES 0 0 - 0.5 %    ABS. NEUTROPHILS 7.7 1.8 - 8.0 K/UL    ABS. LYMPHOCYTES 3.0 0.8 - 3.5 K/UL    ABS. MONOCYTES 0.9 0.0 - 1.0 K/UL    ABS. EOSINOPHILS 0.1 0.0 - 0.4 K/UL    ABS. BASOPHILS 0.1 0.0 - 0.1 K/UL    ABS. IMM.  GRANS. 0.0 0.00 - 0.04 K/UL    DF AUTOMATED     NT-PRO BNP    Collection Time: 11/09/21  7:19 AM   Result Value Ref Range    NT pro- <450 pg/mL   EKG, 12 LEAD, INITIAL    Collection Time: 11/09/21  7:35 AM   Result Value Ref Range    Ventricular Rate 97 BPM Atrial Rate 97 BPM    P-R Interval 132 ms    QRS Duration 78 ms    Q-T Interval 358 ms    QTC Calculation (Bezet) 454 ms    Calculated P Axis 81 degrees    Calculated R Axis 68 degrees    Calculated T Axis 81 degrees    Diagnosis       Sinus rhythm with Premature atrial complexes  Left ventricular hypertrophy with repolarization abnormality  Abnormal ECG  No previous ECGs available  Confirmed by Joe Gallego MD ([id number]) on 11/9/2021 8:29:42 AM     TROPONIN-HIGH SENSITIVITY    Collection Time: 11/09/21  8:05 AM   Result Value Ref Range    Troponin-High Sensitivity 34 0 - 51 ng/L   D DIMER    Collection Time: 11/09/21 10:21 AM   Result Value Ref Range    D DIMER 1.03 (H) <0.50 ug/ml(FEU)   TROPONIN-HIGH SENSITIVITY    Collection Time: 11/09/21  2:29 PM   Result Value Ref Range    Troponin-High Sensitivity 43 0 - 51 ng/L       Radiologic Studies -   [unfilled]  CT Results  (Last 48 hours)               11/09/21 1238  CTA CHEST W OR W WO CONT Final result    Impression:  Emphysema. RML focal atelectasis. Minor posteromedial basilar subsegmental atelectasis. 3 mm LINA nodule. No CT evidence for PE. Thoracoabdominal aorta atherosclerosis. Likely mucous central dependent bronchi. Narrative:  CTA chest.       Axial images are reviewed along with reformatted sagittal/coronal/MIP images. 100 mL Isovue 370 administered. Dose reduction: All CT scans at this facility are performed using dose reduction   optimization techniques as appropriate to a performed exam including the   following-   automated exposure control, adjustments of mA and/or Kv according to patient   size, or use of iterative reconstructive technique. Apical pleural-parenchymal change. Emphysema. 3 mm lingular LINA nodule. Focal RML atelectasis; this likely accounts for recent plain film finding. Minor posteromedial basilar subsegmental atelectasis.        Enhanced images reveal advanced atherosclerotic change thoracic aorta with   extension and great vessels. Normal caliber pulmonary arterial trunk. Normal   contrast opacification pulmonary arterial trunk and its branch vessels; no CT   evidence for PE. Central dependent bronchi are incompletely aerated suggesting mucous content. No   mediastinal lymphadenopathy. No pericardial effusion. Atherosclerosis continues into the imaged upper abdomen. CXR Results  (Last 48 hours)               11/09/21 0832  XR CHEST PORT Final result    Narrative:  Chest single view. Coarse reticulonodular markings through lungs. Less than 1 cm nodular density   right lung base. No gross interstitial or alveolar pulmonary edema. Cardiac   silhouette within normal limits. Elongated thoracic aorta with atherosclerotic   change. No pneumothorax or sizable pleural effusion. CT chest findings 3/4/2021 noted. Consider CT follow-up to demonstrate stability. Medical Decision Making and ED Course   - I am the first and primary provider for this patient AND AM THE PRIMARY PROVIDER OF RECORD. - I reviewed the vital signs, available nursing notes, past medical history, past surgical history, family history and social history. - Initial assessment performed. The patients presenting problems have been discussed, and the staff are in agreement with the care plan formulated and outlined with them. I have encouraged them to ask questions as they arise throughout their visit. Vital Signs-Reviewed the patient's vital signs.     Patient Vitals for the past 24 hrs:   Temp Pulse Resp BP SpO2   11/09/21 1455  (!) 109 24 (!) 142/84 91 %   11/09/21 1418     (!) 86 %   11/09/21 1238  99  (!) 152/65 90 %   11/09/21 1221     91 %   11/09/21 1200 98.3 °F (36.8 °C) 97 24 (!) 149/62 93 %   11/09/21 0856     91 %   11/09/21 0811  98 (!) 31 134/75 91 %   11/09/21 0743     90 %   11/09/21 0738     (!) 89 %   11/09/21 0713 98.5 °F (36.9 °C) 96 27 (!) 146/63 91 %       Records Reviewed: Nursing Notes    Provider Notes (Medical Decision Making):     Presenting with shortness of breath and coughing. Differential diagnoses include COPD, pneumonia, PE and CHF. Will obtain labs, chest x-ray, continue oxygen supplementation and give the patient nebulizer bronchodilators. ED Course:       ED Course as of 11/09/21 1553   Tue Nov 09, 2021   0745 EKG was done at 7:35 AM interpreted by me as normal sinus rhythm rate of 97, AR within normal, QRS within normal, normal axis, no ST elevation or depression. No signs of dysrhythmia or blocks. Baseline motion artifact. No old EKG for comparison. [AA]      ED Course User Index  [AA] Gabriel Quintanilla MD     Received DuoNeb and albuterol and has some improvement. Her work-up so far is unrevealing for her tachypnea and hypoxia. She had CTA which was negative for acute pulmonary embolus. Patient was ambulated around the emerge department and was noted to be very tachypneic. Thus, patient is admitted to the hospital for shortness of breath and hypoxia. Disposition     Disposition: Condition stable  Admitted to Floor Medical Floor the case was discussed with the admitting physician Dr. Lico Regalado    Admitted    Diagnosis     Clinical Impression:   1. SOB (shortness of breath)    2. Chronic obstructive pulmonary disease, unspecified COPD type ([address]        Attestations: Alondra Ruiz MD    Please note that this dictation was completed with [employer], the computer voice recognition software. Quite often unanticipated grammatical, syntax, homophones, and other interpretive errors are inadvertently transcribed by the computer software. Please disregard these errors. Please excuse any errors that have escaped final proofreading. Thank you.

## 2021-11-09 NOTE — PROGRESS NOTES
Reason for Admission:  Respiratory Failure                     RUR Score: 10%                    Plan for utilizing home health:   Uses no DME/will await rehab evals/recommendations. PCP: First and Last name:  Anjum Qureshi MD     Name of Practice:    Are you a current patient: Yes/No: Yes   Approximate date of last visit: 11/3/21. Can you participate in a virtual visit with your PCP: Yes/call. Current Advanced Directive/Advance Care Plan: Full Code      Healthcare Decision Maker:                Primary Decision Maker: Josh Eagle - Daughter - [phone]                  Transition of Care Plan:   D/C Plan is home with daughter/family member  & daughter will transport home upon discharge.

## 2021-11-09 NOTE — ED TRIAGE NOTES
Pt woke up having a \"coughing fit\" and became short of breath. Dyspnea on exertion. Hx of emphysema /COPD. 91%on 5l. 20g L AC. Dx withbronchitis last Wednesday.

## 2021-11-09 NOTE — PROGRESS NOTES
11/9/21. D/C Plan is home with daughter & daughter/family member will transport home. Uses no DME/no home health 2 this time /will await rehab evlas/recommendations.

## 2021-11-10 PROBLEM — R62.51 FAILURE TO THRIVE (CHILD): Status: ACTIVE | Noted: 2021-01-01

## 2021-11-10 PROBLEM — E43 SEVERE PROTEIN-CALORIE MALNUTRITION (HCC): Status: ACTIVE | Noted: 2021-01-01

## 2021-11-10 PROBLEM — R13.10 DYSPHAGIA: Status: ACTIVE | Noted: 2021-01-01

## 2021-11-10 PROBLEM — R63.4 UNINTENTIONAL WEIGHT LOSS: Status: ACTIVE | Noted: 2021-01-01

## 2021-11-10 NOTE — PROGRESS NOTES
Hospitalist Progress Note    Subjective:   Daily Progress Note: 11/10/2021 11:57 AM    Hospital Course:     Kevin Landry is a 77 y.o. female who has PMH significant for HTN, COPD, osteoporosis and chronic smoker. Brought into the emergency room for progressive shortness of breath with associated cough and weakness. Diagnosed by PCP 1 week PTA with bronchitis and placed on prednisone. Symptoms not improving. In the emergency room she is hypoxic on room air and placed on 5 L nasal cannula for saturations of 91%. She is then, frail, failure to thrive malnourished. She lives with family otherwise independent of ADLs. CT of chest revealing right middle lobe focal atelectasis, segmental basilar atelectasis, mucus likely central dependent bronchi, 3 mm left upper lobe nodule. Negative for PE. Significant labs on admission , calcium 10.3, WBC 11.8, D-dimer 1.03. Will test for COVID-19. Will admit for further management respiratory failure with hypoxia, COPD exacerbation, pneumonia and bronchiectasis, failure to thrive, weight loss, dysphagia. Will consult pulmonary for recommendations. Will start on azithromycin, folic acid, thiamine, IV Solu-Medrol, Symbicort and Spiriva, Mucinex and Tessalon. Subjective: Follow-up examination patient at the bedside. Family members and patient confirms she has not been able to eat for quite a while due to coughing and choking with food. Modified. Swallow pending. Discussed possibility of EGD evaluation of esophagus and/or PEG tube if required. Both patient and family consent. Current Facility-Administered Medications   Medication Dose Route Frequency    amLODIPine (NORVASC) tablet 5 mg  5 mg Oral DAILY    aspirin chewable tablet 81 mg  81 mg Oral DAILY    ergocalciferol capsule 50,000 Units  50,000 Units Oral Q7D    . PHARMACY TO SUBSTITUTE PER PROTOCOL (Reordered from: ibandronate (BONIVA) 150 mg tablet)    Per Protocol    pravastatin (PRAVACHOL) tablet 10 mg  10 mg Oral QPM    metoprolol tartrate (LOPRESSOR) tablet 50 mg  50 mg Oral BID    sodium chloride (NS) flush 5-40 mL  5-40 mL IntraVENous Q8H    sodium chloride (NS) flush 5-40 mL  5-40 mL IntraVENous PRN    acetaminophen (TYLENOL) tablet 650 mg  650 mg Oral Q6H PRN    Or    acetaminophen (TYLENOL) suppository 650 mg  650 mg Rectal Q6H PRN    polyethylene glycol (MIRALAX) packet 17 g  17 g Oral DAILY PRN    bisacodyL (DULCOLAX) tablet 5 mg  5 mg Oral DAILY PRN    ondansetron (ZOFRAN ODT) tablet 4 mg  4 mg Oral Q6H PRN    Or    ondansetron (ZOFRAN) injection 4 mg  4 mg IntraVENous Q6H PRN    famotidine (PEPCID) tablet 20 mg  20 mg Oral BID    enoxaparin (LOVENOX) injection 40 mg  40 mg SubCUTAneous DAILY    nicotine (NICODERM CQ) 7 mg/24 hr patch 1 Patch  1 Patch TransDERmal DAILY    guaiFENesin ER (MUCINEX) tablet 1,200 mg  1,200 mg Oral Q12H    benzonatate (TESSALON) capsule 200 mg  200 mg Oral TID    azithromycin (ZITHROMAX) tablet 500 mg  500 mg Oral DAILY    thiamine mononitrate (B-1) tablet 100 mg  100 mg Oral DAILY    multivitamin with folic acid (ONE DAILY WITH FOLIC ACID) tablet 1 Tablet  1 Tablet Oral DAILY    tiotropium bromide (SPIRIVA RESPIMAT) 2.5 mcg /actuation  2 Puff Inhalation DAILY    budesonide-formoteroL (SYMBICORT) 160-4.5 mcg/actuation HFA inhaler 2 Puff  2 Puff Inhalation BID RT    methylPREDNISolone (PF) (SOLU-MEDROL) injection 40 mg  40 mg IntraVENous Q8H        REVIEW OF SYSTEMS    Review of Systems   Constitutional: Positive for malaise/fatigue. HENT: Positive for congestion. Respiratory: Positive for cough, sputum production and shortness of breath. Cardiovascular: Negative for chest pain. Gastrointestinal: Negative. Genitourinary: Negative. Musculoskeletal: Positive for back pain. Neurological: Positive for weakness.         Objective:     Visit Vitals  BP (!) 156/79 (BP 1 Location: Right upper arm, BP Patient Position: At rest)   Pulse (!) 104   Temp 97.8 °F (36.6 °C)   Resp 16   Ht 5' 4\" (1.626 m)   Wt 35 kg (77 lb 3.2 oz)   SpO2 93%   BMI 13.25 kg/m²    O2 Flow Rate (L/min): 4 l/min O2 Device: Nasal cannula    Temp (24hrs), Av.9 °F (36.6 °C), Min:97.7 °F (36.5 °C), Max:98.2 °F (36.8 °C)      No intake/output data recorded.  1901 - 11/10 0700  In: 61 [P.O.:60]  Out: 325 [Urine:325]    PHYSICAL EXAM:    Physical Exam  Constitutional:       Appearance: She is ill-appearing. Comments: Severe protein calorie cachexia   HENT:      Nose: Congestion present. Cardiovascular:      Rate and Rhythm: Normal rate and regular rhythm. Pulmonary:      Effort: No respiratory distress. Skin:     General: Skin is dry. Comments: Poor skin turgor   Neurological:      Mental Status: She is oriented to person, place, and time. Motor: Weakness present.           Data Review    Recent Results (from the past 24 hour(s))   TROPONIN-HIGH SENSITIVITY    Collection Time: 21  2:29 PM   Result Value Ref Range    Troponin-High Sensitivity 43 0 - 51 ng/L   SARS-COV-2    Collection Time: 21  3:44 PM   Result Value Ref Range    SARS-CoV-2 Please find results under separate order     COVID-19 RAPID TEST    Collection Time: 21  3:44 PM   Result Value Ref Range    Specimen source Nasopharyngeal      COVID-19 rapid test Not Detected Not Detected     METABOLIC PANEL, BASIC    Collection Time: 11/10/21  6:51 AM   Result Value Ref Range    Sodium 140 136 - 145 mmol/L    Potassium 3.7 3.5 - 5.1 mmol/L    Chloride 106 97 - 108 mmol/L    CO2 24 21 - 32 mmol/L    Anion gap 10 5 - 15 mmol/L    Glucose 142 (H) 65 - 100 mg/dL    BUN 39 (H) 6 - 20 mg/dL    Creatinine 0.77 0.55 - 1.02 mg/dL    BUN/Creatinine ratio 51 (H) 12 - 20      GFR est AA >60 >60 ml/min/1.73m2    GFR est non-AA >60 >60 ml/min/1.73m2    Calcium 10.5 (H) 8.5 - 10.1 mg/dL   CBC W/O DIFF    Collection Time: 11/10/21  6:51 AM   Result Value Ref Range    WBC 8.5 3.6 - 11.0 K/uL RBC 4.78 3.80 - 5.20 M/uL    HGB 14.5 11.5 - 16.0 g/dL    HCT 43.5 35.0 - 47.0 %    MCV 91.0 80.0 - 99.0 FL    MCH 30.3 26.0 - 34.0 PG    MCHC 33.3 30.0 - 36.5 g/dL    RDW 15.1 (H) 11.5 - 14.5 %    PLATELET 466 (H) 546 - 400 K/uL    MPV 10.5 8.9 - 12.9 FL    NRBC 0.0 0.0  WBC    ABSOLUTE NRBC 0.00 0.00 - 0.01 K/uL   GLUCOSE, POC    Collection Time: 11/10/21  7:41 AM   Result Value Ref Range    Glucose (POC) 173 (H) 65 - 117 mg/dL    Performed by Laura COLEY        CTA CHEST W OR W WO CONT   Final Result   Emphysema. RML focal atelectasis. Minor posteromedial basilar subsegmental atelectasis. 3 mm LINA nodule. No CT evidence for PE. Thoracoabdominal aorta atherosclerosis. Likely mucous central dependent bronchi.       XR CHEST PORT   Final Result      XR SWALLOW FUNC VIDEO    (Results Pending)       Active Problems:    COPD with acute exacerbation ([address] (11/9/2021)      Acute respiratory failure with hypoxia ([address] (11/9/2021)      Hypertension (11/9/2021)      Tobacco abuse (11/9/2021)      Osteoporosis (11/9/2021)      Community acquired pneumonia (11/9/2021)      Bronchiectasis ([address] (11/9/2021)      Dysphagia (11/10/2021)      Failure to thrive (child) (11/10/2021)      Severe protein-calorie malnutrition ([address] (11/10/2021)        Assessment/Plan:     Pneumonia community-acquired  Bronchiectasis  COPD exacerbation  Respiratory failure with hypoxia  Test for COVID-19 pending  Chest CT negative for PE, positive for RML focal atelectasis, segmental basilar atelectasis, mucus likely in central dependent bronchi and 3 mm LINA nodule  Chronic smoker, not on home inhalers  We will start on Zithromax, IV Solu-Medrol, Mucinex and Tessalon  We will start Symbicort and Spiriva neb  Consult pulmonary     Hypertension  Continue home medication metoprolol 50 mg twice daily, amlodipine 5 mg/day     Osteoporosis with chronic back pain  Continue home medication vitamin D3, Boniva     Tobacco abuse  Counseled concerning risks associated with continued tobacco abuse  Initiated nicotine replacement therapy     Failure to thrive  Malnutritionsevere  Dysphagia  Unintentional weight loss  We will consult nutrition  Add folic acid and thiamine  Speech recommending MBSpending  Discussed with patient and family would be willing for EGD evaluation if needed and PEG tube placement if needed      DVT Prophylaxis: Lovenox  Code Status: Full Code  POA/NOK: Daughters    Disposition and discharge barriers:   · Swallow eval, MBS  · Treatment for CAP  · Wean oxygen  Care Plan discussed with: Patient, daughter, RN, IDR team  _____________________________________________________________________________  Time spent in direct care including coordination of service, review of data and examination: > 35 minutes    ______________________________________________________________________________    Luis Sarahy, NP    This is dictation was done by [employer], computer voice recognition software. Quite often unanticipated grammatical, syntax, homophones and other interpretive errors or inadvertently transcribed by the computer software. Please excuse errors that have escaped final proofreading. Thank you.

## 2021-11-10 NOTE — CONSULTS
Comprehensive Nutrition Assessment    Type and Reason for Visit: Initial, Consult (Low BMI/ONS)    Nutrition Recommendations/Plan:   Continue full liquids/mildly thick per SLP    Add ensure enlive daily- midly thick (350kcal, 20g pro)  Add magic cup TID (870kcal, 27g pro)      Supplements providing 87% kcal and 89% pro needs- sufficient     Continue thiamin and folic acid    Consider addition of appetite stimulant  Consider PEG placement to meet long term nutrition goals/needs    Document % intakes and BM in I/O's    Nutrition Assessment:  Admitted for SOB and weakness. Diagnoses by PCP, 1 week ago, with bronchitis. On 4 L NC, per pulmonary. SLP rec full liquids/mildly thick. RD visited pt today. Reports no PO intake x2 days. Has hunger cues. Agreeable to magic cup and ensure (thickened) for wt gain purposes-vanilla flavor only. PTA pt with very poor PO. Eating 3x per day, but meals/snacks are very small and not substantial. Labs: Glu , BUN 39, Ca 10.5. Meds: pepcid, IVF, thiamin, MVI with folic acid, enoxaparin. Malnutrition Assessment:  Malnutrition Status:  Severe malnutrition    Context:  Chronic illness     Findings of the 6 clinical characteristics of malnutrition:   Energy Intake:  7 - 75% or less est energy requirements for 1 month or longer  Weight Loss:  7.0 - Greater than 7.5% over 3 months (-12% in 3 months)     Body Fat Loss:  7 - Severe body fat loss, Triceps, Orbital   Muscle Mass Loss:  7 - Severe muscle mass loss, Calf (gastrocnemius), Clavicles (pectoralis &deltoids), Hand (interosseous), Scapula (trapezius), Thigh (quadriceps), Temples (temporalis)  Fluid Accumulation:  No significant fluid accumulation,      Estimated Daily Nutrient Needs:  Energy (kcal): 1400kcal (40kcal/kg); Weight Used for Energy Requirements: Current  Protein (g): 53g (1,5g/kg);  Weight Used for Protein Requirements: Current  Fluid (ml/day): 1500ml; Method Used for Fluid Requirements: Other (comment) (adult minimum)    Nutrition Related Findings:  NFPE showing severe muscle wasting throughout body. Denies C/S issues PTA, SLP following. No n/v. No BM since admit. No edema. Wounds:    None       Current Nutrition Therapies:  ADULT DIET Full Liquid; Mildly Thick (Nectar)    Anthropometric Measures:  · Height:  5' 4\" (162.6 cm)  · Current Body Wt:  35 kg (77 lb 2.6 oz)     · Usual Body Wt:  39.9 kg (88 lb) (3 months ago)     · Ideal Body Wt:  120 lbs:  64.3 %   · BMI Category:  Underweight (BMI less than 22) age over 72       Nutrition Diagnosis:   · Increased nutrient needs related to increased demand for energy/nutrients as evidenced by BMI, weight loss 7.5% in 3 months, severe muscle loss, severe loss of subcutaneous fat    Nutrition Interventions:   Food and/or Nutrient Delivery: Continue current diet, Start oral nutrition supplement (advance diet as medically able)  Nutrition Education and Counseling: No recommendations at this time  Coordination of Nutrition Care: Continue to monitor while inpatient, Speech therapy    Goals:  Pt to meet >65% of EEN within 3 days. Wt gain 0.5kg per week. Genaro jain       Nutrition Monitoring and Evaluation:   Behavioral-Environmental Outcomes: None identified  Food/Nutrient Intake Outcomes: Food and nutrient intake, Supplement intake, Diet advancement/tolerance  Physical Signs/Symptoms Outcomes: Chewing or swallowing, Weight, Meal time behavior    Discharge Planning:     Too soon to determine     Electronically signed by Luther Gallego RD on 11/10/2021 at 9:25 AM    Contact: [phone]

## 2021-11-10 NOTE — PROGRESS NOTES
PHYSICAL THERAPY EVALUATION  Patient: Jace Rocha (34 y.o. female)  Date: 11/10/2021  Primary Diagnosis: Acute respiratory failure with hypoxia (HCC) [J96.01]  COPD with acute exacerbation (HCC) [J44.1]        Precautions: fall  ASSESSMENT  Jace Rocha is a 77 y.o. female who has PMH significant for HTN, COPD, osteoporosis and chronic smoker. Brought into the emergency room for progressive shortness of breath with associated cough and weakness. Diagnosed by PCP 1 week PTA with bronchitis and placed on prednisone. Symptoms not improving. In the emergency room she is hypoxic on room air and placed on 5 L nasal cannula for saturations of 91%. She is then, frail, failure to thrive malnourished. She lives with family otherwise independent of ADLs. CT of chest revealing right middle lobe focal atelectasis, segmental basilar atelectasis, mucus likely central dependent bronchi, 3 mm left upper lobe nodule. Negative for PE. Significant labs on admission , calcium 10.3, WBC 11.8, D-dimer 1.03. Will test for COVID-19. Will admit for further management respiratory failure with hypoxia, COPD exacerbation, pneumonia and bronchiectasis, failure to thrive, weight loss, dysphagia. Will consult pulmonary for recommendations. Will start on azithromycin, folic acid, thiamine, IV Solu-Medrol, Symbicort and Spiriva, Mucinex and Tessalon. Family members and patient confirms she has not been able to eat for quite a while due to coughing and choking with food. Modified. Swallow pending. Discussed possibility of EGD evaluation of esophagus and/or PEG tube if required. Pt A&O x 4. Per patient and daughter pt resides with daughter  in a 2 level home with 4 MANUEL,adelfo handrails, pt was min A from family  for ADLS/IADLS, please refer to flow sheet for DME at home.     Based on the objective data described below, the patient presents with generalized weakness, impaired functional mobility, impaired amb, impaired balance, and endurance to activities. Pt semisupine upon PT arrival, agreeable to evaluation. Pt required cg  for bed mobility, cg  supine <> sit, min A sit <> stand transfers. Patient was incontinent upon standing. Pt amb  3 feet with gt belt, RW, and min to mod assist; demonstrating shuffling  gt pattern with gen weakness  noted. Pt did fair with session today with PT. Pt will benefit from continued skilled PT to address above deficits and return to OF. Current PT DC recommendation HHPT due to patient declined to go to rehab. Met daughter in [facility] and she confirms that she and her sisters were wanting her to go home if its not absolutely necessary for her to go to rehab. They were ok with HH. Current Level of Function Impacting Discharge (mobility/balance): patient has decreased safety due to weakness. Other factors to consider for discharge: HHPT     PLAN :  Recommendations and Planned Interventions: bed mobility training, transfer training, gait training, therapeutic exercises, patient and family training/education, and therapeutic activities      Recommend with staff: BSC with min assist    Frequency/Duration: Patient will be followed by physical therapy:  3-5x/week to address goals. Recommendation for discharge: (in order for the patient to meet his/her long term goals)  Home with [address] [place] [address]    This discharge recommendation:  Has been made in collaboration with the attending provider and/or case management    IF patient discharges home will need the following DME: rolling walker         SUBJECTIVE:   Patient stated i am ok.     OBJECTIVE DATA SUMMARY:   HISTORY:    Past Medical History:   Diagnosis Date    COPD (chronic obstructive pulmonary disease) ([facility] [address]     Essential hypertension     Family history of skin cancer     Osteoporosis     Tobacco abuse      Past Surgical History:   Procedure Laterality Date    HX MOHS PROCEDURES  02/28/2018    BCC L lateral forehead by Dr. Marylou Mason Home Situation  Home Environment: (P) Private residence  # Steps to Enter: (P) 4  Rails to Enter: (P) Yes  Hand Rails : (P) Bilateral  One/Two Story Residence: (P) Two story, live on 1st floor  Living Alone: (P) No  Support Systems: (P) Child(frank)  Patient Expects to be Discharged to[de-identified] (P) House  Current DME Used/Available at Home: (P) None  Tub or Shower Type: (P) Tub/Shower combination    EXAMINATION/PRESENTATION/DECISION MAKING:   Critical Behavior:  Neurologic State: Alert  Orientation Level: Oriented X4  Cognition: Follows commands  Safety/Judgement: Decreased awareness of need for safety, Decreased insight into deficits  Hearing: Auditory  Auditory Impairment: None  Range Of Motion:  AROM: Generally decreased, functional                       Strength:    Strength: Generally decreased, functional                    Tone & Sensation:   Tone: Abnormal (ms wasting)                                   Functional Mobility:  Bed Mobility:  Rolling: Stand-by assistance  Supine to Sit: Contact guard assistance  Sit to Supine: Contact guard assistance  Scooting: Contact guard assistance  Transfers:  Sit to Stand: Minimum assistance  Stand to Sit: Minimum assistance                       Balance:   Sitting: Intact  Standing: Impaired; With support  Standing - Static: Fair; Occasional  Standing - Dynamic : Fair; Constant support  Ambulation/Gait Training:  Distance (ft): 4 Feet (ft)  Assistive Device: Gait belt; Walker  Ambulation - Level of Assistance: Minimal assistance; Moderate assistance     Gait Description (WDL): Exceptions to [facility]           Base of Support: Narrowed        Step Length: Right shortened; Left shortened                    Functional Measure:  [address] [facility] [address] [address] AM-PAC 6 Clicks         Basic Mobility Inpatient Short Form  How much difficulty does the patient currently have. .. Unable A Lot A Little None   1. Turning over in bed (including adjusting bedclothes, sheets and blankets)?    [] 1   [] 2   [x] 3   [] 4   2. Sitting down on and standing up from a chair with arms ( e.g., wheelchair, bedside commode, etc.)   [] 1   [] 2   [x] 3   [] 4   3. Moving from lying on back to sitting on the side of the bed? [] 1   [] 2   [x] 3   [] 4          How much help from another person does the patient currently need. .. Total A Lot A Little None   4. Moving to and from a bed to a chair (including a wheelchair)? [] 1   [] 2   [x] 3   [] 4   5. Need to walk in hospital room? [] 1   [x] 2   [] 3   [] 4   6. Climbing 3-5 steps with a railing? [] 1   [x] 2   [] 3   [] 4   © , Trustees of [address] [place] [address] [address] [address], under license to [employer] All rights reserved     Score:  Initial:  Most Recent: X (Date:11/10/2021 )   Interpretation of Tool:  Represents activities that are increasingly more difficult (i.e. Bed mobility, Transfers, Gait). Score 24 23 22-20 19-15 14-10 9-7 6   Modifier CH CI CJ CK CL CM CN         Physical Therapy Evaluation Charge Determination   History Examination Presentation Decision-Making   LOW Complexity : Zero comorbidities / personal factors that will impact the outcome / POC LOW Complexity : 1-2 Standardized tests and measures addressing body structure, function, activity limitation and / or participation in recreation  LOW Complexity : Stable, uncomplicated  Other outcome measures [facility] 6        Based on the above components, the patient evaluation is determined to be of the following complexity level: LOW     Pain Ratin/10     Activity Tolerance:   Fair    After treatment patient left in no apparent distress:   Supine in bed, Call bell within reach, and Bed / chair alarm activated and board updated. GOALS:    Problem: Mobility Impaired (Adult and Pediatric)  Goal: *Acute Goals and Plan of Care (Insert Text)  Description: Patient will move from supine to sit and sit to supine , scoot up and down, and roll side to side in bed with independence within 7 day(s). Patient will transfer from bed to chair and chair to bed with independence using the least restrictive device within 7 day(s). Patient will improve static standing balance to independence within 1 week(s). Patient will ambulate 40 feet with independence with least restrictive device within 1 weeks. Outcome: Progressing Towards Goal       COMMUNICATION/EDUCATION:   The patients plan of care was discussed with: Occupational therapist, Registered nurse, and Case management. Fall prevention education was provided and the patient/caregiver indicated understanding., Patient/family have participated as able in goal setting and plan of care. , and Patient/family agree to work toward stated goals and plan of care.          Thank you for this referral.  Alie Mccartney, PT   Time Calculation: 20 mins

## 2021-11-10 NOTE — PROGRESS NOTES
MBS completed. Rec full mildly thick liquids with strict asp/GERD precautions. Rec GI consult for further esophageal assessment. Concerns for adequate nutritional intake, she may benefit from PEG placement. Rec RD consult. Full report to follow.

## 2021-11-10 NOTE — PROGRESS NOTES
Patient has given choices for home health agencies for when she is discharged. Referrals sent through [facility]. [facility]. Referral also sent to [facility] for DME, rolling walker and 3 in 1 bedside commode. DC plan is home with [facility] and DME through [facility].

## 2021-11-10 NOTE — PROGRESS NOTES
SPEECH LANGUAGE PATHOLOGY BEDSIDE SWALLOW EVALUATIONS  Patient: Lauren Murray  (58 y.o. )  Date: 11/10/2021  Primary Diagnosis: Acute respiratory failure with hypoxia, COPD exacerbation     Precautions: Aspiration    ASSESSMENT :  Patient is alert, oriented x4, and follows basic commands. Patient is cachetic w/ reports of 15 pound weight loss and decreased tolerance to PO intake. Patient reports increased fear of choking and increased pharyngeal globus sensation. Weak vocal quality w/ reduced  respiratory drive and atypical resonance at time. Patient presents w/ moderate pharyngeal dysphagia. Oral phase is largely wfl. Pharyngeal phase c/b mild swallow delay and reduced HLE upon palpation. Atypical audible swallow, multiple swallows and reports of globus sensation. Overt s/s of pen/asp observe w/ thin c/b throat clear. However globus sensation w/ all consistencies. Patient will benefit from skilled intervention to address the above impairments. Patients rehabilitation potential is considered to be Fair     PLAN :  Recommendations and Planned Interventions:  Rec moderately thick liquids pending MBS. MBS planned for today. Crush meds in moderately thick liquids. Frequency/Duration: Patient will be followed by speech-language pathology 5 times a week to address goals. Discharge Recommendations: To Be Determined     SUBJECTIVE:   Patient reports 15 pound weight loss and decreased tolerance to PO intake mostly drinking ensures s/t fear of choking and pharyngeal globus sensation. OBJECTIVE:   Patient admitted w/ SOB and cough. Past Medical History:   Diagnosis Date    COPD (chronic obstructive pulmonary disease) ([facility] [address]     Essential hypertension     Family history of skin cancer     Osteoporosis     Tobacco abuse        CTA CHEST W OR W WO CONT   Final Result   Emphysema. RML focal atelectasis. Minor posteromedial basilar subsegmental atelectasis. 3 mm LINA nodule.    No CT evidence for PE. Thoracoabdominal aorta atherosclerosis. Likely mucous central dependent bronchi. XR CHEST PORT   Final Result      XR SWALLOW FUNC VIDEO    (Results Pending)           Diet prior to admission: liquids  Current Diet:  DIET ADULT     Cognitive and Communication Status:  Neurologic State: Alert  Orientation Level: Oriented X4  Cognition: Follows commands  Perception: Appears intact  Perseveration: No perseveration noted  Safety/Judgement: Decreased awareness of need for safety, Decreased insight into deficits  Swallowing Evaluation:   Oral Assessment:  Oral Assessment  Labial: No impairment  Dentition: Limited  Oral Hygiene: caries, broken teeth  Lingual: No impairment  Velum: No impairment  Mandible: No impairment  P.O. Trials:  Patient Position: upright in bed  Vocal quality prior to P.O.: Hoarse; Low volume  Consistency Presented: Puree; Solid; Thin liquid  How Presented: SLP-fed/presented; Spoon     Bolus Acceptance: No impairment  Bolus Formation/Control: No impairment     Propulsion: No impairment  Oral Residue: None  Initiation of Swallow: Delayed (# of seconds)  Laryngeal Elevation: Decreased  Aspiration Signs/Symptoms: Clear throat  Pharyngeal Phase Characteristics: Altered vocal quality; Audible swallow; Effortful swallow; Feeling of discomfort; Foreign body sensation; Multiple swallows; Suspected pharyngeal residue             Oral Phase Severity: Minimal  Pharyngeal Phase Severity : Moderate  Voice:     Vocal Quality: Hoarse; Low volume          Pain:  Pain Scale 1: Numeric (0 - 10)  Pain Intensity 1: 0         After treatment:   Patient left in no apparent distress in bed, Call bell within reach, Nursing notified, and Bed / chair alarm activated    COMMUNICATION/EDUCATION:   Patient was educated regarding diet recs, s/s aspiration, and aspiration precautions. She demonstrated Fair understanding as evidenced by engagement.     The patient's plan of care including recommendations, planned interventions, and recommended diet changes were discussed with: Registered nurse and NP . Patient/family agree to work toward stated goals and plan of care. Problem: Dysphagia (Adult)  Goal: *Acute Goals and Plan of Care (Insert Text)  Description: Speech Therapy Swallow Goals  Initiated 11/10/2021  -Patient will tolerate moderately thick liquids without clinical indicators of aspiration given minimal cues within 7 day(s). -Patient will tolerate PO trials without clinical indicators of aspiration given minimal cues within 7 day(s). -Patient will participate in modified barium swallow study within 7 day(s). -Patient will demonstrate understanding of swallow safety precautions and aspiration precautions, diet recs with minimal cues within 7 day(s).            Outcome: Not Met     Thank you for this referral.  Radha Banda M.S., M.Ed., CCC-SLP  Time Calculation: 15 mins

## 2021-11-10 NOTE — PROGRESS NOTES
SPEECH PATHOLOGY MODIFIED BARIUM SWALLOW STUDY  Patient: Eden Lee (82 y.o. female)  Date: 11/10/2021  Primary Diagnosis: Acute respiratory failure with hypoxia ([facility] [address] [J96.01]  COPD with acute exacerbation (HCC) [J44.1]        Precautions:    ASSESSMENT :  Based on the objective data described below, the patient presents with moderate pharyngeal dysphagia. Oral phase c/b slow bolus manipulation and A-P transit. All consistencies initiate at the level of the vallecula. Overall, minimal swallow delay appreciated. Moderate reduction in BOT retraction, HLE, and pharyngeal constriction. Limited hyoid protraction. Epiglottis w/ curled morphology w/ limited inversion and slow recoil. Laryngeal penetration w/ thin liquids s/t pharyngeal residue clears w/ independent throat clear. Flash trace penetration s/t pharyngeal residue w/ pudding trials. Moderate to significant pharyngeal residue increases w/ thicker consistencies. Reduced relaxation and duration of relaxation of UES s/t global weakness and concerns for esophageal dysphagia. Slow movement of bolus through proximal and mid esophagus w/ retrograde to level of the pyriforms. Patient will benefit from skilled intervention to address the above impairments. Patients rehabilitation potential is considered to be Guarded     PLAN :  Recommendations and Planned Interventions:  Rec full mildly thick liquids with strict asp/GERD precautions. Rec GI consult for further esophageal assessment. Concerns for adequate nutritional intake, she may benefit from PEG placement. Rec RD consult. Frequency/Duration: Patient will be followed by speech-language pathology 5 times a week to address goals. Discharge Recommendations: HH vs OP     SUBJECTIVE:   Patient reports 15 pound weight loss and decreased tolerance to PO intake mostly drinking ensures s/t fear of choking and pharyngeal globus sensation.      OBJECTIVE:     Past Medical History:   Diagnosis Date    COPD [facility]chronic obstructive pulmonary disease) ([facility] [address]     Essential hypertension     Family history of skin cancer     Osteoporosis     Tobacco abuse      Past Surgical History:   Procedure Laterality Date    HX MOHS PROCEDURES  02/28/2018    BCC L lateral forehead by Dr. Damian Odonnell         CXR Results  (Last 48 hours)                 11/09/21 0832  XR CHEST PORT Final result    Narrative:  Chest single view. Coarse reticulonodular markings through lungs. Less than 1 cm nodular density   right lung base. No gross interstitial or alveolar pulmonary edema. Cardiac   silhouette within normal limits. Elongated thoracic aorta with atherosclerotic   change. No pneumothorax or sizable pleural effusion. CT chest findings 3/4/2021 noted. Consider CT follow-up to demonstrate stability. Current Diet:  DIET ADULT    Radiologist: BK Kraft  Film Views: Lateral  Patient Position: upright in chair    Video Flouroscopic Procedures  [x] Lateral View   [] A-P View [] Scanned to level of Sternum    [] Seated at 90 deg. [] Other:    Presentation:   [x] Spoon   [x] Cup   [] Straw   [] Syringe   [] Consecutive Swallows  [] Other:    Consistencies:   [x] Ba+ liquid   [x] Ba+ liquid (nectar)   [x] Ba+ liquid (honey)     [x] Ba+ pudding   [] Ba+ crunched cookie   [] Ba+ cookie   [] Other:       Treatment Techniques Attempted     [] Head Turn: [] Right [] Left     [] Head Tilt: [] Right [] Left     [x] Chin Down:  [] Thermal Sensitization:  [] Supraglottic Swallow:  [] Mendelson's Maneuver:  [] Other:        Decreased Tongue Base Retraction?: Yes  Laryngeal Elevation: Inadequate epiglottic inversion; Reduced excursion with laryngeal vestibule gap  Aspiration/Penetration Score: 2 (Penetration/No residue-Contrast enters the airway penetrates, remains above the folds/cords, and is cleared)  Pharyngeal Symmetry: Symmetrical  Pharyngeal-Esophageal Segment: Decreased relaxation of upper esophageal segment;  Suspected esophageal dysphagia  Pharyngeal Dysfunction: Crico-pharyngeal dysfunction; Decreased tongue base retraction; Decreased strength; Decreased elevation/closure; Decreased pharyngeal wall constriction      COMMUNICATION/EDUCATION:   Patient was educated regarding diet recs, s/s aspiration, and aspiration precautions. She demonstrated Fair understanding as evidenced by engagement.     The patient's plan of care including recommendations, planned interventions, and recommended diet changes were discussed with: Registered nurse and NP .      Patient/family agree to work toward stated goals and plan of care. Problem: Dysphagia (Adult)  Goal: *Acute Goals and Plan of Care (Insert Text)  Description: Speech Therapy Swallow Goals  Initiated 11/10/2021  -Patient will tolerate moderately thick liquids without clinical indicators of aspiration given minimal cues within 7 day(s). -Patient will tolerate PO trials without clinical indicators of aspiration given minimal cues within 7 day(s). -Patient will participate in modified barium swallow study within 7 day(s). -Patient will demonstrate understanding of swallow safety precautions and aspiration precautions, diet recs with minimal cues within 7 day(s).            11/10/2021 1425 by Bard Albrecht  Outcome: Progressing Towards Goal     Thank you for this referral.  Day Gimenez M.S., M.Ed., CCC-SLP  Time Calculation: 20 mins

## 2021-11-10 NOTE — PROGRESS NOTES
Patient having difficulty swallowing. Stated she drinks mostly Ensure at home. Speech consult ordered per protocol.

## 2021-11-10 NOTE — PROGRESS NOTES
PULMONARY NOTE  [facility]    Name: Andres Thomas MRN: [id number]   : 1945 Hospital: [address] [place] [address]   Date: 11/10/2021  Admission date: 2021 Hospital Day: 2       HPI:     Hospital Problems  Date Reviewed: 2018          Codes Class Noted POA    COPD with acute exacerbation [facility]) ICD-10-CM: J44.1  ICD-9-CM: 491.21  2021 Unknown        Acute respiratory failure with hypoxia [facility]) ICD-10-CM: J96.01  ICD-9-CM: 518.81  2021 Unknown        Hypertension ICD-10-CM: I10  ICD-9-CM: 401.9  2021 Unknown        Tobacco abuse ICD-10-CM: Z72.0  ICD-9-CM: 305.1  2021 Unknown        Osteoporosis ICD-10-CM: M81.0  ICD-9-CM: 733.00  2021 Unknown        Community acquired pneumonia ICD-10-CM: J18.9  ICD-9-CM: 472  2021 Unknown        Bronchiectasis ([address] ICD-10-CM: J47.9  ICD-9-CM: 494.0  2021 Unknown                   [x] High complexity decision making was performed  [x] See my orders for details      Subjective/Initial History:     I was asked by Mitchell Ruano MD to see Andres Thomas  a 77 y.o.  female in consultation     Excerpts from admission 2021 or consult notes as follows:   76-year-old lady came in because of shortness of breath and dyspnea she has significant past medical history of COPD hypertension and osteoporosis she is a chronic smoker quit smoking this morning before coming to the hospital. She was complaining about dyspnea and cough seen by primary care physician recommended prednisone did not seem to help saturation was in the 80s she was put on oxygen 5 with a nasal cannula she is not on any oxygen at home CAT scan of the chest was done which shows right middle lobe atelectasis and 3 mm left upper lobe lung nodule negative, embolism so admitted and pulmonary consult was called for further evaluation.       No Known Allergies     MAR reviewed and pertinent medications noted or modified as needed Current Facility-Administered Medications   Medication    amLODIPine (NORVASC) tablet 5 mg    aspirin chewable tablet 81 mg    ergocalciferol capsule 50,000 Units    . PHARMACY TO SUBSTITUTE PER PROTOCOL (Reordered from: ibandronate (BONIVA) 150 mg tablet)    pravastatin (PRAVACHOL) tablet 10 mg    metoprolol tartrate (LOPRESSOR) tablet 50 mg    sodium chloride (NS) flush 5-40 mL    sodium chloride (NS) flush 5-40 mL    acetaminophen (TYLENOL) tablet 650 mg    Or    acetaminophen (TYLENOL) suppository 650 mg    polyethylene glycol (MIRALAX) packet 17 g    bisacodyL (DULCOLAX) tablet 5 mg    ondansetron (ZOFRAN ODT) tablet 4 mg    Or    ondansetron (ZOFRAN) injection 4 mg    famotidine (PEPCID) tablet 20 mg    enoxaparin (LOVENOX) injection 40 mg    nicotine (NICODERM CQ) 7 mg/24 hr patch 1 Patch    guaiFENesin ER (MUCINEX) tablet 1,200 mg    benzonatate (TESSALON) capsule 200 mg    azithromycin (ZITHROMAX) tablet 500 mg    thiamine mononitrate (B-1) tablet 100 mg    multivitamin with folic acid (ONE DAILY WITH FOLIC ACID) tablet 1 Tablet    tiotropium bromide (SPIRIVA RESPIMAT) 2.5 mcg /actuation    budesonide-formoteroL (SYMBICORT) 160-4.5 mcg/actuation HFA inhaler 2 Puff    methylPREDNISolone (PF) (SOLU-MEDROL) injection 40 mg      Patient PCP: Vishnu Child MD  PMH:  has a past medical history of COPD (chronic obstructive pulmonary disease) ([facility] Essential hypertension, Family history of skin cancer, Osteoporosis, and Tobacco abuse. PSH:   has a past surgical history that includes hx mohs procedure (02/28/2018). FHX: family history includes Hypertension in her mother. SHX:  reports that she has been smoking. She started smoking about 60 years ago. She has a 60.00 pack-year smoking history. She has never used smokeless tobacco.     ROS:    Review of Systems   Constitutional: Positive for malaise/fatigue and weight loss. HENT: Negative. Eyes: Negative.     Respiratory: Positive for cough, sputum production, shortness of breath and wheezing. Cardiovascular: Positive for chest pain and orthopnea. Gastrointestinal: Negative. Genitourinary: Negative. Musculoskeletal: Negative. Skin: Negative. Neurological: Negative. Psychiatric/Behavioral: Negative. Objective:     Vital Signs: Telemetry:    normal sinus rhythm Intake/Output:   Visit Vitals  BP (!) 156/79 (BP 1 Location: Right upper arm, BP Patient Position: At rest)   Pulse (!) 104   Temp 97.8 °F (36.6 °C)   Resp 16   Ht 5' 4\" (1.626 m)   Wt 35 kg (77 lb 3.2 oz)   SpO2 93%   BMI 13.25 kg/m²       Temp (24hrs), Av °F (36.7 °C), Min:97.7 °F (36.5 °C), Max:98.3 °F (36.8 °C)        O2 Device: Nasal cannula O2 Flow Rate (L/min): 4 l/min       Wt Readings from Last 4 Encounters:   11/10/21 35 kg (77 lb 3.2 oz)   21 45.4 kg (100 lb)          Intake/Output Summary (Last 24 hours) at 11/10/2021 1032  Last data filed at 11/10/2021 0511  Gross per 24 hour   Intake 60 ml   Output 325 ml   Net -265 ml       Last shift:      No intake/output data recorded. Last 3 shifts:  1901 - 11/10 0700  In: 61 [P.O.:60]  Out: 325 [Urine:325]       Physical Exam:     Physical Exam  Constitutional:       Appearance: Normal appearance. She is ill-appearing and diaphoretic. HENT:      Head: Normocephalic and atraumatic. Nose: Nose normal.      Mouth/Throat:      Mouth: Mucous membranes are moist.   Eyes:      Pupils: Pupils are equal, round, and reactive to light. Cardiovascular:      Rate and Rhythm: Normal rate and regular rhythm. Pulses: Normal pulses. Pulmonary:      Effort: Respiratory distress present. Breath sounds: Wheezing and rhonchi present. Abdominal:      General: Abdomen is flat. Bowel sounds are normal.      Palpations: Abdomen is soft. Musculoskeletal:         General: Normal range of motion. Cervical back: Normal range of motion and neck supple.    Skin:     General: Skin is warm.   Neurological:      General: No focal deficit present. Mental Status: She is alert. Psychiatric:         Mood and Affect: Mood normal.          Labs:    Recent Labs     11/10/21  0651 11/09/21  0719   WBC 8.5 11.8*   HGB 14.5 14.4   * 405*     Recent Labs     11/10/21  0651 11/09/21  0719    140   K 3.7 3.6    104   CO2 24 28   * 134*   BUN 39* 48*   CREA 0.77 0.90   CA 10.5* 10.3*   ALB  --  3.8   ALT  --  20     No results for input(s): PH, PCO2, PO2, HCO3, FIO2 in the last 72 hours. No results for input(s): CPK, CKNDX, TROIQ in the last 72 hours. No lab exists for component: CPKMB  No results found for: BNPP, BNP   No results found for: CULTNo results found for: TSH, TSHEXT, TSHEXT    Imaging:    CXR Results  (Last 48 hours)               11/09/21 0832  XR CHEST PORT Final result    Narrative:  Chest single view. Coarse reticulonodular markings through lungs. Less than 1 cm nodular density   right lung base. No gross interstitial or alveolar pulmonary edema. Cardiac   silhouette within normal limits. Elongated thoracic aorta with atherosclerotic   change. No pneumothorax or sizable pleural effusion. CT chest findings 3/4/2021 noted. Consider CT follow-up to demonstrate stability. Results from [place] encounter on 11/09/21    XR CHEST PORT    Narrative  Chest single view. Coarse reticulonodular markings through lungs. Less than 1 cm nodular density  right lung base. No gross interstitial or alveolar pulmonary edema. Cardiac  silhouette within normal limits. Elongated thoracic aorta with atherosclerotic  change. No pneumothorax or sizable pleural effusion. CT chest findings 3/4/2021 noted. Consider CT follow-up to demonstrate stability. Results from [place] encounter on 11/09/21    CTA CHEST W OR W WO CONT    Narrative  CTA chest.    Axial images are reviewed along with reformatted sagittal/coronal/MIP images.   100 mL Isovue 370 administered. Dose reduction: All CT scans at this facility are performed using dose reduction  optimization techniques as appropriate to a performed exam including the  following-  automated exposure control, adjustments of mA and/or Kv according to patient  size, or use of iterative reconstructive technique. Apical pleural-parenchymal change. Emphysema. 3 mm lingular LINA nodule. Focal RML atelectasis; this likely accounts for recent plain film finding. Minor posteromedial basilar subsegmental atelectasis. Enhanced images reveal advanced atherosclerotic change thoracic aorta with  extension and great vessels. Normal caliber pulmonary arterial trunk. Normal  contrast opacification pulmonary arterial trunk and its branch vessels; no CT  evidence for PE. Central dependent bronchi are incompletely aerated suggesting mucous content. No  mediastinal lymphadenopathy. No pericardial effusion. Atherosclerosis continues into the imaged upper abdomen. Impression  Emphysema. RML focal atelectasis. Minor posteromedial basilar subsegmental atelectasis. 3 mm LINA nodule. No CT evidence for PE. Thoracoabdominal aorta atherosclerosis. Likely mucous central dependent bronchi. IMPRESSION:   1. Acute hypoxic respiratory failure  2. Chronic Obstructive Pulmonary Disease   3. Left upper lobe 3 mm lung nodule  4. Bronchiectasis  5. Community-acquired pneumonia  6. COPD cachexia      RECOMMENDATIONS/PLAN:   1. On 4 liter  nasal Cannula oxygen as salvage oxygen delivery device to provide high concentration of oxygen to overcome refractory hypoxia;  2. Patient on IV Solu-Medrol and nebulizer treatment  3. Start wean oxygen per protocol  4. Patient on Zithromax and Zosyn  5.  Nebulizer treatment along with Symbicort and Spiriva       ·           Pito Olson MD

## 2021-11-10 NOTE — DISCHARGE INSTR - DIET

## 2021-11-11 NOTE — CONSULTS
Consult    Patient: Liberty Judd MRN: [id number]  SSN: [id number]    YOB: 1945  Age: 77 y.o. Sex: female      Subjective:      Liberty Judd is a 77 y.o. female who is being seen for dysphagia  Hx from chart mostly  She was brought into the emergency room for shortness of breath cough   CT of chest revealing right middle lobe focal atelectasis, segmental basilar atelectasis,  Was admited  for further management respiratory failure with hypoxia, COPD exacerbation, pneumonia and bronchiectasisill start on azithromycin, folic acid, thiamine, IV Solu-Medrol, Symbicort and Spiriva, Mucinex and Tessalon. her breath appeared to be better,   ,  Over last few months she had failure to thrive malnourished. failure to thrive, weight loss, dysphagia.  GI consult placed   Speech evaluation :MBS completed. Rec full mildly thick liquids with strict asp/GERD precautions. Rec GI consult for further esophageal assessment.  Concerns for adequate nutritional intake    Past Medical History:   Diagnosis Date    [facility] obstructive [facility] disease) [facility]     Essential hypertension     Family history of skin cancer     Osteoporosis     Tobacco abuse      Past Surgical History:   Procedure Laterality Date    HX MOHS PROCEDURES  02/28/2018    BCC L lateral forehead by Dr. Marine Alford       Family History   Problem Relation Age of Onset    Hypertension Mother      Social History     Tobacco Use    Smoking status: Current Every Day Smoker     Packs/day: 1.00     Years: 60.00     Pack years: 60.00     Start date: 3/4/1961    Smokeless tobacco: Never Used   Substance Use Topics    Alcohol use: Not on file      Current Facility-Administered Medications   Medication Dose Route Frequency Provider Last Rate Last Admin    amLODIPine (NORVASC) tablet 5 mg  5 mg Oral DAILY Cyrena Mattes, NP   5 mg at 11/10/21 1004    aspirin chewable tablet 81 mg  81 mg Oral DAILY Cyrena Mattes, NP   81 mg at 11/10/21 1004    ergocalciferol capsule 50,000 Units  50,000 Units Oral Q7D Katt Bernal NP        pravastatin (PRAVACHOL) tablet 10 mg  10 mg Oral QPM Katt Bernal NP   10 mg at 11/10/21 1820    azithromycin (ZITHROMAX) 500 mg in 0.9% sodium chloride 250 mL (VIAL-MATE)  500 mg IntraVENous Q24H Katt Bernal  mL/hr at 11/10/21 1544 500 mg at 11/10/21 1544    guaiFENesin (ROBITUSSIN) 100 mg/5 mL oral liquid 400 mg  400 mg Oral Q6H Katt Bernal NP   400 mg at 11/10/21 1819    famotidine (PF) (PEPCID) 20 mg in 0.9% sodium chloride 10 mL injection  20 mg IntraVENous DAILY Katt Bernal NP   20 mg at 11/10/21 1536    thiamine (B-1) injection 100 mg  100 mg IntraMUSCular DAILY Katt Bernal NP   100 mg at 11/10/21 1658    metoprolol (LOPRESSOR) injection 5 mg  5 mg IntraVENous Q6H Katt Bernal NP   5 mg at 11/10/21 1820    sodium chloride (NS) flush 5-40 mL  5-40 mL IntraVENous Q8H Katt Bernal NP   10 mL at 11/10/21 1446    sodium chloride (NS) flush 5-40 mL  5-40 mL IntraVENous PRN Katt Bernal NP        acetaminophen (TYLENOL) tablet 650 mg  650 mg Oral Q6H PRN Katt Bernal NP   650 mg at 11/09/21 2153    Or    acetaminophen (TYLENOL) suppository 650 mg  650 mg Rectal Q6H PRN Katt Bernal NP        polyethylene glycol (MIRALAX) packet 17 g  17 g Oral DAILY PRN Katt Bernal NP        bisacodyL (DULCOLAX) tablet 5 mg  5 mg Oral DAILY PRN Katt Bernal NP        ondansetron (ZOFRAN ODT) tablet 4 mg  4 mg Oral Q6H PRN Katt Bernal NP        Or    ondansetron [facility]) injection 4 mg  4 mg IntraVENous Q6H PRN Katt Bernal NP        enoxaparin (LOVENOX) injection 40 mg  40 mg SubCUTAneous DAILY Katt Bernal NP   40 mg at 11/10/21 1000    nicotine (NICODERM CQ) 7 mg/24 hr patch 1 Patch  1 Patch TransDERmal DAILY Katt Bernal NP   1 Patch at 11/10/21 1010    guaiFENesin ER (MUCINEX) tablet 1,200 mg  1,200 mg Oral Q12H Pauline Rothman Sherry You, NP   1,200 mg at 11/09/21 2153    benzonatate (TESSALON) capsule 200 mg  200 mg Oral TID Chapo Stagers, NP   200 mg at 11/09/21 2153    multivitamin with folic acid (ONE DAILY WITH FOLIC ACID) tablet 1 Tablet  1 Tablet Oral DAILY Chapo Stagers, NP        tiotropium bromide (SPIRIVA RESPIMAT) 2.5 mcg /actuation  2 Puff Inhalation DAILY Chapo Stagers, NP        budesonide-formoteroL (SYMBICORT) 160-4.5 mcg/actuation HFA inhaler 2 Puff  2 Puff Inhalation BID RT Chapo Stagers, NP   2 Puff at 11/10/21 1926    methylPREDNISolone (PF) (SOLU-MEDROL) injection 40 mg  40 mg IntraVENous Q8H Chapo Stagers, NP   40 mg at 11/10/21 1415        No Known Allergies    Review of Systems:  Review of Systems   Unable to perform ROS: Mental acuity        Objective:     Vitals:    11/10/21 1600 11/10/21 1928 11/10/21 1929 11/10/21 2000   BP:   119/61    Pulse: 97  78 72   Resp:   18    Temp:   97 °F (36.1 °C)    SpO2:  97% 96%    Weight:       Height:            Physical Exam:  Physical Exam  Constitutional:       Appearance: She is ill-appearing. HENT:      Head: Atraumatic. Mouth/Throat:      Mouth: Mucous membranes are dry. Eyes:      General:         Left eye: No discharge. Cardiovascular:      Rate and Rhythm: Rhythm irregular. Pulses: Normal pulses. Pulmonary:      Effort: Pulmonary effort is normal.   Abdominal:      General: Abdomen is flat. Palpations: Abdomen is soft. Tenderness: There is no abdominal tenderness. There is no guarding. Musculoskeletal:         General: No swelling. Normal range of motion. Cervical back: Normal range of motion. Neurological:      General: No focal deficit present. Motor: No weakness.           Recent Results (from the past 24 hour(s))   METABOLIC PANEL, BASIC    Collection Time: 11/10/21  6:51 AM   Result Value Ref Range    Sodium 140 136 - 145 mmol/L    Potassium 3.7 3.5 - 5.1 mmol/L    Chloride 106 97 - 108 mmol/L    CO2 24 21 - 32 mmol/L    Anion gap 10 5 - 15 mmol/L    Glucose 142 (H) 65 - 100 mg/dL    BUN 39 (H) 6 - 20 mg/dL    Creatinine 0.77 0.55 - 1.02 mg/dL    BUN/Creatinine ratio 51 (H) 12 - 20      GFR est AA >60 >60 ml/min/1.73m2    GFR est non-AA >60 >60 ml/min/1.73m2    Calcium 10.5 (H) 8.5 - 10.1 mg/dL   CBC W/O DIFF    Collection Time: 11/10/21  6:51 AM   Result Value Ref Range    WBC 8.5 3.6 - 11.0 K/uL    RBC 4.78 3.80 - 5.20 M/uL    HGB 14.5 11.5 - 16.0 g/dL    HCT 43.5 35.0 - 47.0 %    MCV 91.0 80.0 - 99.0 FL    MCH 30.3 26.0 - 34.0 PG    MCHC 33.3 30.0 - 36.5 g/dL    RDW 15.1 (H) 11.5 - 14.5 %    PLATELET 697 (H) 582 - 400 K/uL    MPV 10.5 8.9 - 12.9 FL    NRBC 0.0 0.0  WBC    ABSOLUTE NRBC 0.00 0.00 - 0.01 K/uL   GLUCOSE, POC    Collection Time: 11/10/21  7:41 AM   Result Value Ref Range    Glucose (POC) 173 (H) 65 - 117 mg/dL    Performed by Jojo COLEY         XR SWALLOW FUNC VIDEO   Final Result   Penetration with thin consistency. No verónica aspiration. Significant hypopharyngeal residue. CTA CHEST W OR W WO CONT   Final Result   Emphysema. RML focal atelectasis. Minor posteromedial basilar subsegmental atelectasis. 3 mm LINA nodule. No CT evidence for PE. Thoracoabdominal aorta atherosclerosis. Likely mucous central dependent bronchi.       XR CHEST PORT   Final Result         Assessment:     Hospital Problems  Date Reviewed: 2/28/2018          Codes Class Noted POA    Dysphagia ICD-10-CM: R13.10  ICD-9-CM: 787.20  11/10/2021 Unknown        Failure to thrive (child) ICD-10-CM: R62.51  ICD-9-CM: 783.41  11/10/2021 Unknown        Severe protein-calorie malnutrition ([address] ICD-10-CM: E43  ICD-9-CM: 262  11/10/2021 Unknown        Unintentional weight loss ICD-10-CM: R63.4  ICD-9-CM: 783.21  11/10/2021 Unknown        COPD with acute exacerbation ([facility] ICD-10-CM: J44.1  ICD-9-CM: 491.21  11/9/2021 Unknown        Acute respiratory failure with hypoxia ([facility]) ICD-10-CM: J96.01  ICD-9-CM: 518.81  11/9/2021 Unknown        Hypertension ICD-10-CM: I10  ICD-9-CM: 401.9  11/9/2021 Unknown        Tobacco abuse ICD-10-CM: Z72.0  ICD-9-CM: 305.1  11/9/2021 Unknown        Osteoporosis ICD-10-CM: M81.0  ICD-9-CM: 733.00  11/9/2021 Unknown        Community acquired pneumonia ICD-10-CM: J18.9  ICD-9-CM: 469  11/9/2021 Unknown        Bronchiectasis ([facility] ICD-10-CM: J47.9  ICD-9-CM: 494.0  11/9/2021 Unknown          Acute respiratory failure with hypoxia ([facility] (11/9/2021)    Tobacco abuse (11/9/2021)    Community acquired pneumonia (11/9/2021)              Dysphagia (11/10/2021)    Failure to thrive (child) (11/10/2021)    Severe protein-calorie malnutrition ([facility] [address]    Plan:   Continue on Zithromax, IV Solu-Medrol,   Add folic acid and thiamine      schedule for  EGD evaluation, likely need PEG tube placement unless severe esophagitis or stricture seen    indication, risks, option will be discussed with patient's power of  gain weight    Signed By: Curt Broderick MD     November 10, 2021         Thank you for allowing me to participate in this patients care  Cc Referring Physician   Lea Rivas MD

## 2021-11-11 NOTE — PROGRESS NOTES
PULMONARY NOTE  [facility]    Name: Jace Rocha MRN: [id number]   : 1945 Hospital: [address] [place] [address]   Date: 2021  Admission date: 2021 Hospital Day: 3       HPI:     Hospital Problems  Date Reviewed: 2018          Codes Class Noted POA    Dysphagia ICD-10-CM: R13.10  ICD-9-CM: 787.20  11/10/2021 Unknown        Failure to thrive (child) ICD-10-CM: R62.51  ICD-9-CM: 783.41  11/10/2021 Unknown        Severe protein-calorie malnutrition ([facility] ICD-10-CM: E43  ICD-9-CM: 262  11/10/2021 Unknown        Unintentional weight loss ICD-10-CM: R63.4  ICD-9-CM: 783.21  11/10/2021 Unknown        COPD with acute exacerbation ([facility] ICD-10-CM: J44.1  ICD-9-CM: 491.21  2021 Unknown        Acute respiratory failure with hypoxia ([facility] ICD-10-CM: J96.01  ICD-9-CM: 518.81  2021 Unknown        Hypertension ICD-10-CM: I10  ICD-9-CM: 401.9  2021 Unknown        Tobacco abuse ICD-10-CM: Z72.0  ICD-9-CM: 305.1  2021 Unknown        Osteoporosis ICD-10-CM: M81.0  ICD-9-CM: 733.00  2021 Unknown        Community acquired pneumonia ICD-10-CM: J18.9  ICD-9-CM: 741  2021 Unknown        Bronchiectasis ([facility] ICD-10-CM: J47.9  ICD-9-CM: 494.0  2021 Unknown                   [x] High complexity decision making was performed  [x] See my orders for details      Subjective/Initial History:     I was asked by Leidy Iraheta MD to see Jace Rocha  a 77 y.o.    female in consultation     Excerpts from admission 2021 or consult notes as follows:   71-year-old lady came in because of shortness of breath and dyspnea she has significant past medical history of COPD hypertension and osteoporosis she is a chronic smoker quit smoking this morning before coming to the hospital. She was complaining about dyspnea and cough seen by primary care physician recommended prednisone did not seem to help saturation was in the 80s she was put on oxygen 5 with a nasal cannula she is not on any oxygen at home CAT scan of the chest was done which shows right middle lobe atelectasis and 3 mm left upper lobe lung nodule negative, embolism so admitted and pulmonary consult was called for further evaluation. No Known Allergies     MAR reviewed and pertinent medications noted or modified as needed     Current Facility-Administered Medications   Medication    amLODIPine (NORVASC) tablet 5 mg    aspirin chewable tablet 81 mg    ergocalciferol capsule 50,000 Units    pravastatin (PRAVACHOL) tablet 10 mg    azithromycin (ZITHROMAX) 500 mg in 0.9% sodium chloride 250 mL (VIAL-MATE)    guaiFENesin (ROBITUSSIN) 100 mg/5 mL oral liquid 400 mg    famotidine (PF) (PEPCID) 20 mg in 0.9% sodium chloride 10 mL injection    thiamine (B-1) injection 100 mg    metoprolol (LOPRESSOR) injection 5 mg    sodium chloride (NS) flush 5-40 mL    sodium chloride (NS) flush 5-40 mL    acetaminophen (TYLENOL) tablet 650 mg    Or    acetaminophen (TYLENOL) suppository 650 mg    polyethylene glycol (MIRALAX) packet 17 g    bisacodyL (DULCOLAX) tablet 5 mg    ondansetron (ZOFRAN ODT) tablet 4 mg    Or    ondansetron (ZOFRAN) injection 4 mg    nicotine (NICODERM CQ) 7 mg/24 hr patch 1 Patch    guaiFENesin ER (MUCINEX) tablet 1,200 mg    benzonatate (TESSALON) capsule 200 mg    multivitamin with folic acid (ONE DAILY WITH FOLIC ACID) tablet 1 Tablet    tiotropium bromide (SPIRIVA RESPIMAT) 2.5 mcg /actuation    budesonide-formoteroL (SYMBICORT) 160-4.5 mcg/actuation HFA inhaler 2 Puff    methylPREDNISolone (PF) (SOLU-MEDROL) injection 40 mg      Patient PCP: Anjum Qureshi MD  PMH:  has a past medical history of COPD (chronic obstructive pulmonary disease) ([address] Essential hypertension, Family history of skin cancer, Osteoporosis, and Tobacco abuse. PSH:   has a past surgical history that includes hx mohs procedure (02/28/2018).    FHX: family history includes Hypertension in her mother. SHX:  reports that she has been smoking. She started smoking about 60 years ago. She has a 60.00 pack-year smoking history. She has never used smokeless tobacco.     ROS:    Review of Systems   Constitutional: Positive for malaise/fatigue and weight loss. HENT: Negative. Eyes: Negative. Respiratory: Positive for cough, sputum production, shortness of breath and wheezing. Cardiovascular: Positive for chest pain and orthopnea. Gastrointestinal: Negative. Genitourinary: Negative. Musculoskeletal: Negative. Skin: Negative. Neurological: Negative. Psychiatric/Behavioral: Negative. Objective:     Vital Signs: Telemetry:    normal sinus rhythm Intake/Output:   Visit Vitals  /60 (BP 1 Location: Right upper arm, BP Patient Position: At rest)   Pulse 74   Temp 97.6 °F (36.4 °C)   Resp 18   Ht 5' 4\" (1.626 m)   Wt 36.7 kg (80 lb 12.8 oz)   SpO2 97%   BMI 13.87 kg/m²       Temp (24hrs), Av.6 °F (36.4 °C), Min:97 °F (36.1 °C), Max:98.2 °F (36.8 °C)        O2 Device: Nasal cannula O2 Flow Rate (L/min): 3 l/min       Wt Readings from Last 4 Encounters:   21 36.7 kg (80 lb 12.8 oz)   21 45.4 kg (100 lb)          Intake/Output Summary (Last 24 hours) at 2021 1008  Last data filed at 2021 0546  Gross per 24 hour   Intake    Output 520 ml   Net -520 ml       Last shift:      No intake/output data recorded. Last 3 shifts:  1901 -  0700  In: 61 [P.O.:60]  Out: 845 [Urine:845]       Physical Exam:     Physical Exam  Constitutional:       Appearance: Normal appearance. She is ill-appearing and diaphoretic. HENT:      Head: Normocephalic and atraumatic. Nose: Nose normal.      Mouth/Throat:      Mouth: Mucous membranes are moist.   Eyes:      Pupils: Pupils are equal, round, and reactive to light. Cardiovascular:      Rate and Rhythm: Normal rate and regular rhythm. Pulses: Normal pulses.    Pulmonary:      Effort: Respiratory distress present. Breath sounds: Wheezing and rhonchi present. Abdominal:      General: Abdomen is flat. Bowel sounds are normal.      Palpations: Abdomen is soft. Musculoskeletal:         General: Normal range of motion. Cervical back: Normal range of motion and neck supple. Skin:     General: Skin is warm. Neurological:      General: No focal deficit present. Mental Status: She is alert. Psychiatric:         Mood and Affect: Mood normal.          Labs:    Recent Labs     11/11/21  0700 11/10/21  0651 11/09/21 0719   WBC 11.9* 8.5 11.8*   HGB 13.9 14.5 14.4    404* 405*     Recent Labs     11/11/21  0700 11/10/21  0651 11/09/21  0719    140 140   K 4.3 3.7 3.6    106 104   CO2 27 24 28   * 142* 134*   BUN 50* 39* 48*   CREA 0.78 0.77 0.90   CA 10.0 10.5* 10.3*   ALB  --   --  3.8   ALT  --   --  20     No results for input(s): PH, PCO2, PO2, HCO3, FIO2 in the last 72 hours. No results for input(s): CPK, CKNDX, TROIQ in the last 72 hours. No lab exists for component: CPKMB  No results found for: BNPP, BNP   No results found for: CULTNo results found for: TSH, TSHEXT, TSHEXT    Imaging:    CXR Results  (Last 48 hours)    None        Results from Hospital Encounter encounter on 11/09/21    XR SWALLOW FUNC VIDEO    Narrative  Modified barium swallow, 11/10/2021    History: R 13.13    Procedure: This study was performed in conjunction with the speech pathologist.  Fluoroscopic evaluation was performed as the patient ingested various  consistencies of barium. An episode of penetration was seen with thin consistency. No verónica aspiration  occurred. The patient ingested nectar, honey and pudding consistencies without penetration  or aspiration. There was significant hypopharyngeal residue which the patient was not able to  fully clear with repeated swallowing. Fluoroscopy time: 1:28 minutes. Dose: 10 mGy. Number of images: 1.     Impression  Penetration with thin consistency. No verónica aspiration. Significant hypopharyngeal residue. XR CHEST PORT    Narrative  Chest single view. Coarse reticulonodular markings through lungs. Less than 1 cm nodular density  right lung base. No gross interstitial or alveolar pulmonary edema. Cardiac  silhouette within normal limits. Elongated thoracic aorta with atherosclerotic  change. No pneumothorax or sizable pleural effusion. CT chest findings 3/4/2021 noted. Consider CT follow-up to demonstrate stability. Results from [place] encounter on 11/09/21    CTA CHEST W OR W WO CONT    Narrative  CTA chest.    Axial images are reviewed along with reformatted sagittal/coronal/MIP images. 100 mL Isovue 370 administered. Dose reduction: All CT scans at this facility are performed using dose reduction  optimization techniques as appropriate to a performed exam including the  following-  automated exposure control, adjustments of mA and/or Kv according to patient  size, or use of iterative reconstructive technique. Apical pleural-parenchymal change. Emphysema. 3 mm lingular LINA nodule. Focal RML atelectasis; this likely accounts for recent plain film finding. Minor posteromedial basilar subsegmental atelectasis. Enhanced images reveal advanced atherosclerotic change thoracic aorta with  extension and great vessels. Normal caliber pulmonary arterial trunk. Normal  contrast opacification pulmonary arterial trunk and its branch vessels; no CT  evidence for PE. Central dependent bronchi are incompletely aerated suggesting mucous content. No  mediastinal lymphadenopathy. No pericardial effusion. Atherosclerosis continues into the imaged upper abdomen. Impression  Emphysema. RML focal atelectasis. Minor posteromedial basilar subsegmental atelectasis. 3 mm LINA nodule. No CT evidence for PE. Thoracoabdominal aorta atherosclerosis. Likely mucous central dependent bronchi. IMPRESSION:   1.  Acute hypoxic respiratory failure  2. Chronic Obstructive Pulmonary Disease   3. Left upper lobe 3 mm lung nodule  4. Bronchiectasis  5. Community-acquired pneumonia  6. COPD cachexia      RECOMMENDATIONS/PLAN:   1. On 4 liter  nasal Cannula oxygen as salvage oxygen delivery device to provide high concentration of oxygen to overcome refractory hypoxia;  2. Patient on IV Solu-Medrol and nebulizer treatment will discontinue Solu-Medrol start patient on prednisone  3. Start wean oxygen per protocol  4. Patient on Zithromax and Zosyn  5.  Nebulizer treatment along with Symbicort and Eric Baum MD

## 2021-11-11 NOTE — PROGRESS NOTES
Problem: Self Care Deficits Care Plan (Adult)  Goal: *Acute Goals and Plan of Care (Insert Text)  Description: Pt will be MI sup<->sit in prep for EOB ADL's  Pt will be MI  LB dressing sitting/standing level  Pt will be MI  sit<-> prep for toilet transfer  Pt will be MI  toilet transfer with LRAD  Pt will be MI  toileting/cloth mgmt LRAD  Pt will be MI  grooming standing sink  Pt will be MI bathing sitting/standing sink LRAD  Pt will be MI adelfo UE HEP in prep for self care tasks    Outcome: Not Met     OCCUPATIONAL THERAPY EVALUATION  Patient: Marie Bryson (89 y.o. female)  Date: 11/11/2021  Primary Diagnosis: Acute respiratory failure with hypoxia ([address] [J96.01]  COPD with acute exacerbation ([facility] [J44.1]  Procedure(s) (LRB):  ESOPHAGOGASTRODUODENOSCOPY (EGD) (N/A)  PERCUTANEOUS ENDOSCOPIC GASTROSTOMY TUBE INSERTION (N/A)     Precautions:        ASSESSMENT  Pt is 76 y/o female came [facility] with dyspnea on exertion, progressive weakness/SOB and adm 11/9/2021 for acute COPD exacerbation, acute respiratory failure with hypoxia (on new 3L O2 via NC), bronchiectasis, CAP, failure to thrive, severe malnutrition (awaiting EGD and possible PEG placement), left upper lobe 3mm nodule, dysphagia, unintentional weight loss. Pt has hx of COPD, emphysema, bronchitis dx prior Wednesday, HTN, osteoporosis, chronic smoker. Pt received semi supine in bed A&Ox 4 and agreeable for OT eval/tx. Per pt report, pt lives with daughter (has 24/7 care)  in two story home (stays on first floor) with 4 steps adelfo rails to enter and is MI for self care (daugther assists with bathing back as needed) and functional transfers/mobility.      Pt currently presents with decreased balance, decreased activity tolerance (on new 3L O2 via NC), generalized weakness and increased need for assist with self care (SBA LB dressing EOB, SBA toileting/cloth mgmt simulated, SBA simple grooming seated EOB) and functional transfers/mobility (SBA sup<->sit, scooting EOB, sit<->Stand and toilet transfer with RW and gait belt). Pt would benefit from skilled OT services while at [facility] in order to increase safety and independence with self care and functional transfers/mobility. Recommend discharge to [facility] with 24/7 family care when medically appropriate. Other factors to consider for discharge: time since onset, close to baseline functional status, excellent family support        PLAN :  Recommendations and Planned Interventions: self care training, functional mobility training, therapeutic exercise, balance training, therapeutic activities, endurance activities, patient education, and home safety training    Frequency/Duration: Patient will be followed by occupational therapy 2-3x/week to address goals.     Recommendation for discharge: (in order for the patient to meet his/her long term goals)  Home with [address] [place] [address]    This discharge recommendation:  Has been made in collaboration with the attending provider and/or case management    IF patient discharges home will need the following DME: TBD       SUBJECTIVE:   Patient stated I feel weaker than usual.    OBJECTIVE DATA SUMMARY:   HISTORY:   Past Medical History:   Diagnosis Date    COPD (chronic obstructive pulmonary disease) ([facility]     Essential hypertension     Family history of skin cancer     Osteoporosis     Tobacco abuse      Past Surgical History:   Procedure Laterality Date    HX MOHS PROCEDURES  02/28/2018    BCC L lateral forehead by Dr. Awais Al        Expanded or extensive additional review of patient history:     Home Situation  Home Environment: Private residence  # Steps to Enter: 4  Rails to Enter: Yes  Hand Rails : Bilateral  One/Two Story Residence: Two story, live on 1st floor  Living Alone: No  Support Systems: Child(frank)  Patient Expects to be Discharged to[de-identified] House  Current DME Used/Available at Home: None  Tub or Shower Type: Tub/Shower combination    PLOF: Pt MI for ADLS/IADLS, MI with mobility prior to admission. EXAMINATION OF PERFORMANCE DEFICITS:  Cognitive/Behavioral Status:  Neurologic State: Alert  Orientation Level: Oriented X4  Cognition: Follows commands     Hearing: Auditory  Auditory Impairment: None    Range of Motion:  AROM: Within functional limits     Strength:  Strength: Generally decreased, functional (adelfo UE grossly observed to be 3+/5)     Balance:  Sitting: Intact  Standing: Impaired;  Without support  Standing - Static: Constant support; Good  Standing - Dynamic : Constant support; Good    Functional Mobility and Transfers for ADLs:  Bed Mobility:  Rolling: Stand-by assistance  Supine to Sit: Stand-by assistance  Sit to Supine: Stand-by assistance  Scooting: Stand-by assistance    Transfers:  Sit to Stand: Stand-by assistance  Stand to Sit: Stand-by assistance  Bed to Chair: Stand-by assistance  Bathroom Mobility: Stand-by assistance  Toilet Transfer : Stand-by assistance  Assistive Device : Gait Belt; Walker, rolling    ADL Assessment:     Oral Facial Hygiene/Grooming: Stand-by assistance    Upper Body Dressing: Stand-by assistance    Lower Body Dressing: Stand-by assistance    Toileting: Stand by assistance (simulated)     ADL Intervention and task modifications:     Grooming  Grooming Assistance: Stand-by assistance  Position Performed: Seated edge of bed  Washing Hands: Stand-by assistance    Upper Body [address] Gown: Stand-by assistance    Lower Body Dressing Assistance  Socks: Stand-by assistance  Slip on Shoes with Back: Stand-by assistance  Leg Crossed Method Used: Yes  Position Performed: Seated edge of bed    Toileting  Toileting Assistance: Stand-by assistance  Bladder Hygiene: Stand-by assistance  Bowel Hygiene: Stand-by assistance  Clothing Management: Stand-by assistance    [employer] AM-PACTM \"6 Clicks\"                                                       Daily Activity Inpatient Short Form  How much help from another person does the patient currently need. .. Total; A Lot A Little None   1. Putting on and taking off regular lower body clothing? []  1 []  2 [x]  3 []  4   2. Bathing (including washing, rinsing, drying)? []  1 []  2 [x]  3 []  4   3. Toileting, which includes using toilet, bedpan or urinal? [] 1 []  2 [x]  3 []  4   4. Putting on and taking off regular upper body clothing? []  1 []  2 [x]  3 []  4   5. Taking care of personal grooming such as brushing teeth? []  1 []  2 [x]  3 []  4   6. Eating meals? []  1 []  2 []  3 [x]  4   © 2007, Trustees of [address] [place] [address] [address] [address], under license to [employer]. All rights reserved     Score: 19/24     Interpretation of Tool:  Represents clinically-significant functional categories (i.e. Activities of daily living). Percentage of Impairment CH    0%   CI    1-19% CJ    20-39% CK    40-59% CL    60-79% CM    80-99% CN     100%   [facility]  Score 6-24 24 23 20-22 15-19 10-14 7-9 6        Occupational Therapy Evaluation Charge Determination   History Examination Decision-Making   LOW Complexity : Brief history review  MEDIUM Complexity : 3-5 performance deficits relating to physical, cognitive , or psychosocial skils that result in activity limitations and / or participation restrictions MEDIUM Complexity : Patient may present with comorbidities that affect occupational performnce. Miniml to moderate modification of tasks or assistance (eg, physical or verbal ) with assesment(s) is necessary to enable patient to complete evaluation       Based on the above components, the patient evaluation is determined to be of the following complexity level: LOW   Pain Rating:  No pain reported    Activity Tolerance:   Good and requires rest breaks  Please refer to the flowsheet for vital signs taken during this treatment.     After treatment patient left in no apparent distress:    Supine in bed, Call bell within reach, and nursing present    COMMUNICATION/EDUCATION:   The patients plan of care was discussed with: Physical therapist and Registered nurse. Home safety education was provided and the patient/caregiver indicated understanding. and Patient/family have participated as able in goal setting and plan of care. This patients plan of care is appropriate for delegation to [facility].     Thank you for this referral.  Josefina Thomas  Time Calculation: 21 mins

## 2021-11-11 NOTE — PROGRESS NOTES
Problem: Anxiety  Goal: *Alleviation of anxiety (Palliative Care)  Outcome: Progressing Towards Goal     Problem: Chronic Obstructive Pulmonary Disease (COPD)  Goal: *Able to remain out of bed as prescribed  Outcome: Progressing Towards Goal

## 2021-11-11 NOTE — PROGRESS NOTES
Progress Note    Patient: Liberty Judd MRN: [id number]  SSN: [id number]    YOB: 1945  Age: 77 y.o.   Sex: female      Admit Date: 11/9/2021    LOS: 2 days     Subjective:   Patient examined, has difficulty swallow some time, but had a small breakfast this morning,   not willing have any procedure done today, want to talk with her daughter again   Past Medical History:   Diagnosis Date    COPD [facility]chronic obstructive [facility] disease) ([facility]     Essential hypertension     Family history of skin cancer     Osteoporosis     Tobacco abuse         Current Facility-Administered Medications:     predniSONE (DELTASONE) tablet 20 mg, 20 mg, Oral, BID WITH MEALS, Manuelito Lopez MD    metoprolol (LOPRESSOR) injection 5 mg, 5 mg, IntraVENous, Q6H PRN, Kody Santos NP    amLODIPine (NORVASC) tablet 5 mg, 5 mg, Oral, DAILY, Cyrena Tom, NP, 5 mg at 11/10/21 1004    aspirin chewable tablet 81 mg, 81 mg, Oral, DAILY, Cyrena Tom, NP, 81 mg at 11/10/21 1004    ergocalciferol capsule 50,000 Units, 50,000 Units, Oral, Q7D, Kody Santos NP    pravastatin (PRAVACHOL) tablet 10 mg, 10 mg, Oral, QPM, Cyrena Tom, NP, 10 mg at 11/10/21 1820    azithromycin (ZITHROMAX) 500 mg in 0.9% sodium chloride 250 mL (VIAL-MATE), 500 mg, IntraVENous, Q24H, Kody Santos NP, Last Rate: 250 mL/hr at 11/11/21 1220, 500 mg at 11/11/21 1220    guaiFENesin (ROBITUSSIN) 100 mg/5 mL oral liquid 400 mg, 400 mg, Oral, Q6H, Skipa Tom, NP, 400 mg at 11/11/21 1220    famotidine (PF) (PEPCID) 20 mg in 0.9% sodium chloride 10 mL injection, 20 mg, IntraVENous, DAILY, Cyrena Tom, NP, 20 mg at 11/11/21 0892    thiamine (B-1) injection 100 mg, 100 mg, IntraMUSCular, DAILY, Cyrena Tom, NP, 100 mg at 11/11/21 4010    sodium chloride (NS) flush 5-40 mL, 5-40 mL, IntraVENous, Q8H, Kody Santos NP, 10 mL at 11/11/21 1400    sodium chloride (NS) flush 5-40 mL, 5-40 mL, IntraVENous, PRN, Suha Haley NP  [facility]  acetaminophen (TYLENOL) tablet 650 mg, 650 mg, Oral, Q6H PRN, 650 mg at 11/09/21 2153 **OR** acetaminophen (TYLENOL) suppository 650 mg, 650 mg, Rectal, Q6H PRN, Suha Haley NP    polyethylene glycol (MIRALAX) packet 17 g, 17 g, Oral, DAILY PRN, Suha Haley NP    bisacodyL (DULCOLAX) tablet 5 mg, 5 mg, Oral, DAILY PRN, Suha Haley NP    ondansetron (ZOFRAN ODT) tablet 4 mg, 4 mg, Oral, Q6H PRN **OR** ondansetron (ZOFRAN) injection 4 mg, 4 mg, IntraVENous, Q6H PRN, Suha Haley NP  [facility]  nicotine (NICODERM CQ) 7 mg/24 hr patch 1 Patch, 1 Patch, TransDERmal, DAILY, Suha Haley NP, 1 Patch at 11/11/21 7333    guaiFENesin ER (MUCINEX) tablet 1,200 mg, 1,200 mg, Oral, Q12H, Suha Haley NP, 1,200 mg at 11/10/21 2232    benzonatate (TESSALON) capsule 200 mg, 200 mg, Oral, TID, Suha Haley NP, 200 mg at 11/10/21 2232    multivitamin with folic acid (ONE DAILY WITH FOLIC ACID) tablet 1 Tablet, 1 Tablet, Oral, DAILY, Suha Haley NP    tiotropium bromide (SPIRIVA RESPIMAT) 2.5 mcg Nan Euler, [address] [place], Inhalation, DAILY, Suha Haley NP, 2 Puff at 11/11/21 0853    budesonide-formoteroL (SYMBICORT) 160-4.5 mcg/actuation HFA inhaler 2 Puff, 2 Puff, Inhalation, BID RT, Suha Haley NP, 2 Puff at 11/11/21 0854    Objective:     Vitals:    11/11/21 0741 11/11/21 0854 11/11/21 1217 11/11/21 1426   BP: 125/60  119/63 (!) 150/61   Pulse: 74  89 96   Resp: 18  18 18   Temp: 97.6 °F (36.4 °C)   98 °F (36.7 °C)   SpO2: 97% 97% 95% 96%   Weight:       Height:            Intake and Output:  Current Shift: No intake/output data recorded. Last three shifts: 11/09 1901 - 11/11 0700  In: 60 [P.O.:60]  Out: 845 [Urine:845]    Physical Exam:   Physical Exam  Constitutional:       Appearance: She is ill-appearing. HENT:      Mouth/Throat:      Mouth: Mucous membranes are dry. Eyes:      Extraocular Movements: Extraocular movements intact. Cardiovascular:      Rate and Rhythm: Regular rhythm. Heart sounds: Normal heart sounds. Abdominal:      General: Abdomen is flat. Musculoskeletal:         General: No deformity. Normal range of motion. Cervical back: Normal range of motion. Skin:     Findings: No bruising. Neurological:      General: No focal deficit present. Psychiatric:         Mood and Affect: Mood normal.          Lab/Data Review:  Recent Results (from the past 24 hour(s))   CBC WITH AUTOMATED DIFF    Collection Time: 11/11/21  7:00 AM   Result Value Ref Range    WBC 11.9 (H) 3.6 - 11.0 K/uL    RBC 4.50 3.80 - 5.20 M/uL    HGB 13.9 11.5 - 16.0 g/dL    HCT 41.6 35.0 - 47.0 %    MCV 92.4 80.0 - 99.0 FL    MCH 30.9 26.0 - 34.0 PG    MCHC 33.4 30.0 - 36.5 g/dL    RDW 15.3 (H) 11.5 - 14.5 %    PLATELET 917 984 - 762 K/uL    MPV 10.7 8.9 - 12.9 FL    NRBC 0.0 0.0  WBC    ABSOLUTE NRBC 0.00 0.00 - 0.01 K/uL    NEUTROPHILS 88 (H) 32 - 75 %    LYMPHOCYTES 8 (L) 12 - 49 %    MONOCYTES 3 (L) 5 - 13 %    EOSINOPHILS 0 0 - 7 %    BASOPHILS 0 0 - 1 %    IMMATURE GRANULOCYTES 1 (H) 0 - 0.5 %    ABS. NEUTROPHILS 10.5 (H) 1.8 - 8.0 K/UL    ABS. LYMPHOCYTES 0.9 0.8 - 3.5 K/UL    ABS. MONOCYTES 0.4 0.0 - 1.0 K/UL    ABS. EOSINOPHILS 0.0 0.0 - 0.4 K/UL    ABS. BASOPHILS 0.0 0.0 - 0.1 K/UL    ABS. IMM. GRANS. 0.1 (H) 0.00 - 0.04 K/UL    DF AUTOMATED     METABOLIC PANEL, BASIC    Collection Time: 11/11/21  7:00 AM   Result Value Ref Range    Sodium 140 136 - 145 mmol/L    Potassium 4.3 3.5 - 5.1 mmol/L    Chloride 106 97 - 108 mmol/L    CO2 27 21 - 32 mmol/L    Anion gap 7 5 - 15 mmol/L    Glucose 134 (H) 65 - 100 mg/dL    BUN 50 (H) 6 - 20 mg/dL    Creatinine 0.78 0.55 - 1.02 mg/dL    BUN/Creatinine ratio 64 (H) 12 - 20      GFR est AA >60 >60 ml/min/1.73m2    GFR est non-AA >60 >60 ml/min/1.73m2    Calcium 10.0 8.5 - 10.1 mg/dL        XR SWALLOW FUNC VIDEO   Final Result   Penetration with thin consistency. No verónica aspiration. Significant hypopharyngeal residue. CTA CHEST W OR W WO CONT   Final Result   Emphysema. RML focal atelectasis. Minor posteromedial basilar subsegmental atelectasis. 3 mm LINA nodule. No CT evidence for PE. Thoracoabdominal aorta atherosclerosis. Likely mucous central dependent bronchi.       XR CHEST PORT   Final Result           Assessment:     Active Problems:    COPD with acute exacerbation ([address] (11/9/2021)      Acute respiratory failure with hypoxia ([address] (11/9/2021)      Hypertension (11/9/2021)      Tobacco abuse (11/9/2021)      Osteoporosis (11/9/2021)      Community acquired pneumonia (11/9/2021)      Bronchiectasis ([address] (11/9/2021)      Dysphagia (11/10/2021)      Failure to thrive (child) (11/10/2021)      Severe protein-calorie malnutrition ([address] (11/10/2021)      Unintentional weight loss (11/10/2021)        CNKFOSQEL (11/10/2021)    Failure to thrive (child) (11/10/2021)    Severe protein-calorie malnutrition ([address]     Plan:   Continue on Zithromax, oxygen saturations stable  schedule for  EGD evaluation, likely need PEG tube placement unless severe esophagitis or stricture seen    indication, risks, option will be discussed with patient's power of  again  Signed By: Jailyn Gore MD          Plan:       Signed By: Jailyn Gore MD     November 11, 2021        Thank you for allowing me to participate in this patients care  Cc Referring Physician   Renan Singh MD

## 2021-11-11 NOTE — PROGRESS NOTES
PHYSICAL THERAPY TREATMENT  Patient: Sonu Medina (54 y.o. female)  Date: 11/11/2021  Diagnosis: Acute respiratory failure with hypoxia (HCC) [J96.01]  COPD with acute exacerbation (HCC) [J44.1]   <principal problem not specified>  Procedure(s) (LRB):  ESOPHAGOGASTRODUODENOSCOPY (EGD) (N/A)  PERCUTANEOUS ENDOSCOPIC GASTROSTOMY TUBE INSERTION (N/A)    Precautions:    Chart, physical therapy assessment, plan of care and goals were reviewed. ASSESSMENT  Patient received semi-supine in bed , agreeable for PT treatment . Patient continues with skilled PT services and is progressing towards goals. Pt presents with c/o pain in R forearm - 10/10 ,has ice pack for pain relief . Pt requires SBA for rolling to R side , SBA for supine<>sit transfers , demonstrates intact static/dynamic sitting balance with support . Completed there ex's in unsupported sitting position for b/l LE strengthening . Pt performed STS and SPT with SBA , needs cues for hand placement . Pt ambulated - 30'  with Rw, SBA/CGA . demonstrates  slow sylvia , needs cues to increased step length . Overall, pt tolerates session fair today demonstrating improved ability to perform ambulation and increased ambulatory distance of 27' with RW, SBA/CGA . Recommend d/c to home with PT and family care . Current Level of Function Impacting Discharge (mobility/balance): Requires SBA for transfers and SBA/CGA for ambulation . Other factors to consider for discharge: family support , time since onset       PLAN :  Patient continues to benefit from skilled intervention to address the above impairments. Continue treatment per established plan of care. to address goals.     Recommendation for discharge: (in order for the patient to meet his/her long term goals)  Home with [address] [place] [address] and [facility]     This discharge recommendation:  Has been made in collaboration with the attending provider and/or case management    IF patient discharges home will need the following DME: rolling walker       SUBJECTIVE:   Patient stated My arm is hurting     OBJECTIVE DATA SUMMARY:   Critical Behavior:  Neurologic State: Alert  Orientation Level: Oriented X4  Cognition: Follows commands  Safety/Judgement: Decreased awareness of need for safety, Decreased insight into deficits  Functional Mobility Training:  Bed Mobility:  Rolling: Stand-by assistance  Supine to Sit: Stand-by assistance  Sit to Supine: Stand-by assistance  Scooting: Stand-by assistance  Transfers:  Sit to Stand: Stand-by assistance  Stand to Sit: Stand-by assistance        Bed to Chair: Stand-by assistance  Balance:  Sitting: Intact; Without support  Standing: Impaired; Without support  Standing - Static: Good; Constant support  Standing - Dynamic : Good; Constant support  Ambulation/Gait Training:  Distance (ft): 30 Feet (ft)  Assistive Device: Walker, rolling  Ambulation - Level of Assistance: Stand-by assistance; Contact guard assistance  Base of Support: Narrowed  Step Length: Right shortened; Left shortened    Therapeutic Exercises:   AP, LAQ ,seated marches x 10 reps   Pain Rating:  Pain at R forearm - 10/10    Activity Tolerance:   Fair  Please refer to the flowsheet for vital signs taken during this treatment. After treatment patient left in no apparent distress:   Supine in bed, Call bell within reach, Bed / chair alarm activated and Caregiver / family present    COMMUNICATION/COLLABORATION:   The patients plan of care was discussed with: Registered nurse. Problem: Mobility Impaired (Adult and Pediatric)  Goal: *Acute Goals and Plan of Care (Insert Text)  Description: Patient will move from supine to sit and sit to supine , scoot up and down, and roll side to side in bed with independence within 7 day(s). Patient will transfer from bed to chair and chair to bed with independence using the least restrictive device within 7 day(s).     Patient will improve static standing balance to independence within 1 week(s). Patient will ambulate 40 feet with independence with least restrictive device within 1 weeks.        Outcome: Progressing Towards Goal       Tre Schneider   Time Calculation: 28 mins

## 2021-11-11 NOTE — PROGRESS NOTES
Hospitalist Progress Note    Subjective:   Daily Progress Note: 11/11/2021 11:57 AM    Hospital Course:     Pamella Rojas is a 77 y.o. female who has PMH significant for HTN, COPD, osteoporosis and chronic smoker. Brought into the emergency room for progressive shortness of breath with associated cough and weakness. Diagnosed by PCP 1 week PTA with bronchitis and placed on prednisone. Symptoms not improving. In the emergency room she is hypoxic on room air and placed on 5 L nasal cannula for saturations of 91%. She is then, frail, failure to thrive malnourished. She lives with family otherwise independent of ADLs. CT of chest revealing right middle lobe focal atelectasis, segmental basilar atelectasis, mucus likely central dependent bronchi, 3 mm left upper lobe nodule. Negative for PE. Significant labs on admission , calcium 10.3, WBC 11.8, D-dimer 1.03. Will test for COVID-19. Will admit for further management respiratory failure with hypoxia, COPD exacerbation, pneumonia and bronchiectasis, failure to thrive, weight loss, dysphagia. Will consult pulmonary for recommendations. Will start on azithromycin, folic acid, thiamine, IV Solu-Medrol, Symbicort and Spiriva, Mucinex and Tessalon. Patient has also had 15 pound weight loss and inability to swallow effectively. GI consulted for EGD evaluation and PEG tube placement if required. Nutrition consulted for recommendations. Subjective: Follow-up examination patient at the bedside. Family members and patient confirms she has not been able to eat for quite a while due to coughing and choking with food. Modified. Swallow pending. Discussed possibility of EGD evaluation of esophagus and/or PEG tube if required. Both patient and family consent.     Current Facility-Administered Medications   Medication Dose Route Frequency    predniSONE (DELTASONE) tablet 20 mg  20 mg Oral BID WITH MEALS    amLODIPine (NORVASC) tablet 5 mg  5 mg Oral DAILY    aspirin chewable tablet 81 mg  81 mg Oral DAILY    ergocalciferol capsule 50,000 Units  50,000 Units Oral Q7D    pravastatin (PRAVACHOL) tablet 10 mg  10 mg Oral QPM    azithromycin (ZITHROMAX) 500 mg in 0.9% sodium chloride 250 mL (VIAL-MATE)  500 mg IntraVENous Q24H    guaiFENesin (ROBITUSSIN) 100 mg/5 mL oral liquid 400 mg  400 mg Oral Q6H    famotidine (PF) (PEPCID) 20 mg in 0.9% sodium chloride 10 mL injection  20 mg IntraVENous DAILY    thiamine (B-1) injection 100 mg  100 mg IntraMUSCular DAILY    metoprolol (LOPRESSOR) injection 5 mg  5 mg IntraVENous Q6H    sodium chloride (NS) flush 5-40 mL  5-40 mL IntraVENous Q8H    sodium chloride (NS) flush 5-40 mL  5-40 mL IntraVENous PRN    acetaminophen (TYLENOL) tablet 650 mg  650 mg Oral Q6H PRN    Or    acetaminophen (TYLENOL) suppository 650 mg  650 mg Rectal Q6H PRN    polyethylene glycol (MIRALAX) packet 17 g  17 g Oral DAILY PRN    bisacodyL (DULCOLAX) tablet 5 mg  5 mg Oral DAILY PRN    ondansetron (ZOFRAN ODT) tablet 4 mg  4 mg Oral Q6H PRN    Or    ondansetron (ZOFRAN) injection 4 mg  4 mg IntraVENous Q6H PRN    nicotine (NICODERM CQ) 7 mg/24 hr patch 1 Patch  1 Patch TransDERmal DAILY    guaiFENesin ER (MUCINEX) tablet 1,200 mg  1,200 mg Oral Q12H    benzonatate (TESSALON) capsule 200 mg  200 mg Oral TID    multivitamin with folic acid (ONE DAILY WITH FOLIC ACID) tablet 1 Tablet  1 Tablet Oral DAILY    tiotropium bromide (SPIRIVA RESPIMAT) 2.5 mcg /actuation  2 Puff Inhalation DAILY    budesonide-formoteroL (SYMBICORT) 160-4.5 mcg/actuation HFA inhaler 2 Puff  2 Puff Inhalation BID RT        REVIEW OF SYSTEMS    Review of Systems   Constitutional: Positive for malaise/fatigue. HENT: Positive for congestion. Respiratory: Positive for cough, sputum production and shortness of breath. Cardiovascular: Negative for chest pain. Gastrointestinal: Negative. Genitourinary: Negative.     Musculoskeletal: Positive for back pain.   Neurological: Positive for weakness. Objective:     Visit Vitals  /60 (BP 1 Location: Right upper arm, BP Patient Position: At rest)   Pulse 74   Temp 97.6 °F (36.4 °C)   Resp 18   Ht 5' 4\" (1.626 m)   Wt 36.7 kg (80 lb 12.8 oz)   SpO2 97%   BMI 13.87 kg/m²    O2 Flow Rate (L/min): 3 l/min O2 Device: Nasal cannula    Temp (24hrs), Av.6 °F (36.4 °C), Min:97 °F (36.1 °C), Max:98.2 °F (36.8 °C)      No intake/output data recorded.  1901 -  0700  In: 60 [P.O.:60]  Out: 845 [Urine:845]    PHYSICAL EXAM:    Physical Exam  Constitutional:       Appearance: She is ill-appearing. Comments: Severe protein calorie cachexia   HENT:      Nose: Congestion present. Cardiovascular:      Rate and Rhythm: Normal rate and regular rhythm. Pulmonary:      Effort: No respiratory distress. Skin:     General: Skin is dry. Comments: Poor skin turgor   Neurological:      Mental Status: She is oriented to person, place, and time. Motor: Weakness present. Data Review    Recent Results (from the past 24 hour(s))   CBC WITH AUTOMATED DIFF    Collection Time: 21  7:00 AM   Result Value Ref Range    WBC 11.9 (H) 3.6 - 11.0 K/uL    RBC 4.50 3.80 - 5.20 M/uL    HGB 13.9 11.5 - 16.0 g/dL    HCT 41.6 35.0 - 47.0 %    MCV 92.4 80.0 - 99.0 FL    MCH 30.9 26.0 - 34.0 PG    MCHC 33.4 30.0 - 36.5 g/dL    RDW 15.3 (H) 11.5 - 14.5 %    PLATELET 102 197 - 038 K/uL    MPV 10.7 8.9 - 12.9 FL    NRBC 0.0 0.0  WBC    ABSOLUTE NRBC 0.00 0.00 - 0.01 K/uL    NEUTROPHILS 88 (H) 32 - 75 %    LYMPHOCYTES 8 (L) 12 - 49 %    MONOCYTES 3 (L) 5 - 13 %    EOSINOPHILS 0 0 - 7 %    BASOPHILS 0 0 - 1 %    IMMATURE GRANULOCYTES 1 (H) 0 - 0.5 %    ABS. NEUTROPHILS 10.5 (H) 1.8 - 8.0 K/UL    ABS. LYMPHOCYTES 0.9 0.8 - 3.5 K/UL    ABS. MONOCYTES 0.4 0.0 - 1.0 K/UL    ABS. EOSINOPHILS 0.0 0.0 - 0.4 K/UL    ABS. BASOPHILS 0.0 0.0 - 0.1 K/UL    ABS. IMM.  GRANS. 0.1 (H) 0.00 - 0.04 K/UL    DF AUTOMATED METABOLIC PANEL, BASIC    Collection Time: 11/11/21  7:00 AM   Result Value Ref Range    Sodium 140 136 - 145 mmol/L    Potassium 4.3 3.5 - 5.1 mmol/L    Chloride 106 97 - 108 mmol/L    CO2 27 21 - 32 mmol/L    Anion gap 7 5 - 15 mmol/L    Glucose 134 (H) 65 - 100 mg/dL    BUN 50 (H) 6 - 20 mg/dL    Creatinine 0.78 0.55 - 1.02 mg/dL    BUN/Creatinine ratio 64 (H) 12 - 20      GFR est AA >60 >60 ml/min/1.73m2    GFR est non-AA >60 >60 ml/min/1.73m2    Calcium 10.0 8.5 - 10.1 mg/dL       XR SWALLOW FUNC VIDEO   Final Result   Penetration with thin consistency. No verónica aspiration. Significant hypopharyngeal residue. CTA CHEST W OR W WO CONT   Final Result   Emphysema. RML focal atelectasis. Minor posteromedial basilar subsegmental atelectasis. 3 mm LINA nodule. No CT evidence for PE. Thoracoabdominal aorta atherosclerosis. Likely mucous central dependent bronchi.       XR CHEST PORT   Final Result          Active Problems:    COPD with acute exacerbation ([address] (11/9/2021)      Acute respiratory failure with hypoxia ([address] (11/9/2021)      Hypertension (11/9/2021)      Tobacco abuse (11/9/2021)      Osteoporosis (11/9/2021)      Community acquired pneumonia (11/9/2021)      Bronchiectasis ([address] (11/9/2021)      Dysphagia (11/10/2021)      Failure to thrive (child) (11/10/2021)      Severe protein-calorie malnutrition ([address] (11/10/2021)      Unintentional weight loss (11/10/2021)        Assessment/Plan:     Pneumonia community-acquired  Bronchiectasis  COPD exacerbation  Respiratory failure with hypoxia  Test for COVID-19 pending  Chest CT negative for PE, positive for RML focal atelectasis, segmental basilar atelectasis, mucus likely in central dependent bronchi and 3 mm LINA nodule  Chronic smoker, not on home inhalers  We will start on Zithromax, IV Solu-Medrol, Mucinex and Tessalon  We will start Symbicort and Spiriva neb  Consult pulmonary     Hypertension  Continue home medication metoprolol 50 mg twice daily, amlodipine 5 mg/day     Osteoporosis with chronic back pain  Continue home medication vitamin D3, Boniva     Tobacco abuse  Counseled concerning risks associated with continued tobacco abuse  Initiated nicotine replacement therapy     Failure to thrive  Malnutritionsevere  Dysphagia  Unintentional weight loss  We will consult nutrition  Add folic acid and thiamine  Speech recommending MBSpending  Discussed with patient and family would be willing for EGD evaluation if needed and PEG tube placement if needed      DVT Prophylaxis: Lovenox  Code Status: Full Code  POA/NOK: Daughters    Disposition and discharge barriers:   · Swallow eval, MBS poor functioning  · EGD and/or PEG placement  · Treatment for CAP  · Wean oxygen  Care Plan discussed with: Patient, daughter, RN, IDR team  _____________________________________________________________________________  Time spent in direct care including coordination of service, review of data and examination: > 35 minutes    ______________________________________________________________________________    Haylie Martinez NP    This is dictation was done by dragon, computer voice recognition software. Quite often unanticipated grammatical, syntax, homophones and other interpretive errors or inadvertently transcribed by the computer software. Please excuse errors that have escaped final proofreading. Thank you.

## 2021-11-12 NOTE — PROGRESS NOTES
Hospitalist Progress Note    Subjective:   Daily Progress Note: 11/12/2021 11:57 AM    Hospital Course:     Ruchi Rizzo is a 77 y.o. female who has PMH significant for HTN, COPD, osteoporosis and chronic smoker. Brought into the emergency room for progressive shortness of breath with associated cough and weakness. Diagnosed by PCP 1 week PTA with bronchitis and placed on prednisone. Symptoms not improving. In the emergency room she is hypoxic on room air and placed on 5 L nasal cannula for saturations of 91%. She is then, frail, failure to thrive malnourished. She lives with family otherwise independent of ADLs. CT of chest revealing right middle lobe focal atelectasis, segmental basilar atelectasis, mucus likely central dependent bronchi, 3 mm left upper lobe nodule. Negative for PE. Significant labs on admission , calcium 10.3, WBC 11.8, D-dimer 1.03. Will test for COVID-19. Will admit for further management respiratory failure with hypoxia, COPD exacerbation, pneumonia and bronchiectasis, failure to thrive, weight loss, dysphagia. Will consult pulmonary for recommendations. Will start on azithromycin, folic acid, thiamine, IV Solu-Medrol, Symbicort and Spiriva, Mucinex and Tessalon. Patient has also had 15 pound weight loss and inability to swallow effectively. GI consulted for EGD evaluation and PEG tube placement if required. Nutrition consulted for recommendations. Subjective: Follow-up examination patient at the bedside. Patient is nervous today awaiting EGD evaluation.     Current Facility-Administered Medications   Medication Dose Route Frequency    predniSONE (DELTASONE) tablet 20 mg  20 mg Oral BID WITH MEALS    metoprolol (LOPRESSOR) injection 5 mg  5 mg IntraVENous Q6H PRN    amLODIPine (NORVASC) tablet 5 mg  5 mg Oral DAILY    aspirin chewable tablet 81 mg  81 mg Oral DAILY    ergocalciferol capsule 50,000 Units  50,000 Units Oral Q7D    pravastatin (PRAVACHOL) tablet 10 mg  10 mg Oral QPM    azithromycin (ZITHROMAX) 500 mg in 0.9% sodium chloride 250 mL (VIAL-MATE)  500 mg IntraVENous Q24H    guaiFENesin (ROBITUSSIN) 100 mg/5 mL oral liquid 400 mg  400 mg Oral Q6H    famotidine (PF) (PEPCID) 20 mg in 0.9% sodium chloride 10 mL injection  20 mg IntraVENous DAILY    thiamine (B-1) injection 100 mg  100 mg IntraMUSCular DAILY    sodium chloride (NS) flush 5-40 mL  5-40 mL IntraVENous Q8H    sodium chloride (NS) flush 5-40 mL  5-40 mL IntraVENous PRN    acetaminophen (TYLENOL) tablet 650 mg  650 mg Oral Q6H PRN    Or    acetaminophen (TYLENOL) suppository 650 mg  650 mg Rectal Q6H PRN    polyethylene glycol (MIRALAX) packet 17 g  17 g Oral DAILY PRN    bisacodyL (DULCOLAX) tablet 5 mg  5 mg Oral DAILY PRN    ondansetron (ZOFRAN ODT) tablet 4 mg  4 mg Oral Q6H PRN    Or    ondansetron (ZOFRAN) injection 4 mg  4 mg IntraVENous Q6H PRN    nicotine (NICODERM CQ) 7 mg/24 hr patch 1 Patch  1 Patch TransDERmal DAILY    guaiFENesin ER (MUCINEX) tablet 1,200 mg  1,200 mg Oral Q12H    benzonatate (TESSALON) capsule 200 mg  200 mg Oral TID    multivitamin with folic acid (ONE DAILY WITH FOLIC ACID) tablet 1 Tablet  1 Tablet Oral DAILY    tiotropium bromide (SPIRIVA RESPIMAT) 2.5 mcg /actuation  2 Puff Inhalation DAILY    budesonide-formoteroL (SYMBICORT) 160-4.5 mcg/actuation HFA inhaler 2 Puff  2 Puff Inhalation BID RT        REVIEW OF SYSTEMS    Review of Systems   Constitutional: Positive for malaise/fatigue. HENT: Positive for congestion. Respiratory: Positive for cough and sputum production. Negative for shortness of breath. Cardiovascular: Negative for chest pain. Gastrointestinal: Negative. Genitourinary: Negative. Musculoskeletal: Positive for back pain. Neurological: Positive for weakness.         Objective:     Visit Vitals  BP (!) 155/67 (BP 1 Location: Right upper arm, BP Patient Position: At rest)   Pulse 71   Temp 97.4 °F (36.3 °C)   Resp 17   Ht 5' 4\" (1.626 m)   Wt 36.3 kg (80 lb)   SpO2 95%   BMI 13.73 kg/m²    O2 Flow Rate (L/min): 3 l/min O2 Device: Nasal cannula    Temp (24hrs), Av.7 °F (36.5 °C), Min:97.4 °F (36.3 °C), Max:98 °F (36.7 °C)      No intake/output data recorded. 11/10 1901 -  0700  In: -   Out: 36 [Urine:820]    PHYSICAL EXAM:    Physical Exam  Constitutional:       Appearance: She is ill-appearing. Comments: Severe protein calorie cachexia   HENT:      Nose: Congestion present. Cardiovascular:      Rate and Rhythm: Normal rate and regular rhythm. Pulmonary:      Effort: No respiratory distress. Skin:     General: Skin is dry. Comments: Poor skin turgor   Neurological:      Mental Status: She is oriented to person, place, and time. Motor: Weakness present. Data Review    Recent Results (from the past 24 hour(s))   CBC WITH AUTOMATED DIFF    Collection Time: 21  6:25 AM   Result Value Ref Range    WBC 15.1 (H) 3.6 - 11.0 K/uL    RBC 4.33 3.80 - 5.20 M/uL    HGB 13.2 11.5 - 16.0 g/dL    HCT 40.0 35.0 - 47.0 %    MCV 92.4 80.0 - 99.0 FL    MCH 30.5 26.0 - 34.0 PG    MCHC 33.0 30.0 - 36.5 g/dL    RDW 15.1 (H) 11.5 - 14.5 %    PLATELET 309 268 - 714 K/uL    MPV 10.6 8.9 - 12.9 FL    NRBC 0.0 0.0  WBC    ABSOLUTE NRBC 0.00 0.00 - 0.01 K/uL    NEUTROPHILS 80 (H) 32 - 75 %    LYMPHOCYTES 11 (L) 12 - 49 %    MONOCYTES 9 5 - 13 %    EOSINOPHILS 0 0 - 7 %    BASOPHILS 0 0 - 1 %    IMMATURE GRANULOCYTES 0 0 - 0.5 %    ABS. NEUTROPHILS 12.0 (H) 1.8 - 8.0 K/UL    ABS. LYMPHOCYTES 1.7 0.8 - 3.5 K/UL    ABS. MONOCYTES 1.3 (H) 0.0 - 1.0 K/UL    ABS. EOSINOPHILS 0.0 0.0 - 0.4 K/UL    ABS. BASOPHILS 0.0 0.0 - 0.1 K/UL    ABS. IMM.  GRANS. 0.1 (H) 0.00 - 0.04 K/UL    DF AUTOMATED     METABOLIC PANEL, BASIC    Collection Time: 21  6:25 AM   Result Value Ref Range    Sodium 141 136 - 145 mmol/L    Potassium 4.5 3.5 - 5.1 mmol/L    Chloride 108 97 - 108 mmol/L    CO2 29 21 - 32 mmol/L    Anion gap 4 (L) 5 - 15 mmol/L    Glucose 118 (H) 65 - 100 mg/dL    BUN 41 (H) 6 - 20 mg/dL    Creatinine 0.67 0.55 - 1.02 mg/dL    BUN/Creatinine ratio 61 (H) 12 - 20      GFR est AA >60 >60 ml/min/1.73m2    GFR est non-AA >60 >60 ml/min/1.73m2    Calcium 9.6 8.5 - 10.1 mg/dL       XR SWALLOW FUNC VIDEO   Final Result   Penetration with thin consistency. No verónica aspiration. Significant hypopharyngeal residue. CTA CHEST W OR W WO CONT   Final Result   Emphysema. RML focal atelectasis. Minor posteromedial basilar subsegmental atelectasis. 3 mm LINA nodule. No CT evidence for PE. Thoracoabdominal aorta atherosclerosis. Likely mucous central dependent bronchi.       XR CHEST PORT   Final Result          Active Problems:    COPD with acute exacerbation ([address] (11/9/2021)      Acute respiratory failure with hypoxia ([address] (11/9/2021)      Hypertension (11/9/2021)      Tobacco abuse (11/9/2021)      Osteoporosis (11/9/2021)      Community acquired pneumonia (11/9/2021)      Bronchiectasis ([address] (11/9/2021)      Dysphagia (11/10/2021)      Failure to thrive (child) (11/10/2021)      Severe protein-calorie malnutrition ([address] (11/10/2021)      Unintentional weight loss (11/10/2021)        Assessment/Plan:     Pneumonia community-acquired  Bronchiectasis  COPD exacerbation  Respiratory failure with hypoxia  Test for COVID-19 pending  Chest CT negative for PE, positive for RML focal atelectasis, segmental basilar atelectasis, mucus likely in central dependent bronchi and 3 mm LINA nodule  Chronic smoker, not on home inhalers  We will start on Zithromax, IV Solu-Medrol, Mucinex and Tessalon  We will start Symbicort and Spiriva neb  Consult pulmonary     Hypertension  Continue home medication metoprolol 50 mg twice daily, amlodipine 5 mg/day     Osteoporosis with chronic back pain  Continue home medication vitamin D3, Boniva     Tobacco abuse  Counseled concerning risks associated with continued tobacco abuse  Initiated nicotine replacement therapy     Failure to thrive  Malnutritionsevere  Dysphagia  Unintentional weight loss  We will consult nutrition  Add folic acid and thiamine  Speech recommending MBSpending  Discussed with patient and family would be willing for EGD evaluation if needed and PEG tube placement if needed      DVT Prophylaxis: Lovenox  Code Status: Full Code  POA/NOK: Daughters    Disposition and discharge barriers:   · Swallow eval, MBS poor functioning  · EGD and/possible PEG placement  · Treatment for CAP  · Wean oxygen  Care Plan discussed with: Patient, daughter, RN, IDR team  _____________________________________________________________________________  Time spent in direct care including coordination of service, review of data and examination: > 35 minutes    ______________________________________________________________________________    Jonathon Lay NP    This is dictation was done by dragon, computer voice recognition software. Quite often unanticipated grammatical, syntax, homophones and other interpretive errors or inadvertently transcribed by the computer software. Please excuse errors that have escaped final proofreading. Thank you.

## 2021-11-12 NOTE — ANESTHESIA POSTPROCEDURE EVALUATION
Procedure(s):  ESOPHAGOGASTRODUODENOSCOPY (EGD)  PERCUTANEOUS ENDOSCOPIC GASTROSTOMY TUBE INSERTION.     total IV anesthesia, MAC    Anesthesia Post Evaluation      Multimodal analgesia: multimodal analgesia not used between 6 hours prior to anesthesia start to PACU discharge  Patient location during evaluation: bedside  Patient participation: complete - patient cannot participate  Level of consciousness: lethargic  Pain score: 0  Pain management: adequate  Airway patency: patent  Anesthetic complications: no  Cardiovascular status: acceptable  Respiratory status: acceptable  Hydration status: acceptable  Post anesthesia nausea and vomiting:  none  Final Post Anesthesia Temperature Assessment:  Normothermia (36.0-37.5 degrees C)      INITIAL Post-op Vital signs:   Vitals Value Taken Time   /80 11/12/21 1532   Temp 37 °C (98.6 °F) 11/12/21 1532   Pulse 95 11/12/21 1532   Resp 32 11/12/21 1532   SpO2 99 % 11/12/21 1532

## 2021-11-12 NOTE — PROGRESS NOTES
PULMONARY NOTE  [facility]    Name: Chayo Campoverde MRN: [id number]   : 1945 Hospital: [address] [place] [address]   Date: 2021  Admission date: 2021 Hospital Day: 4       HPI:     Hospital Problems  Date Reviewed: 2018          Codes Class Noted POA    Dysphagia ICD-10-CM: R13.10  ICD-9-CM: 787.20  11/10/2021 Unknown        Failure to thrive (child) ICD-10-CM: R62.51  ICD-9-CM: 783.41  11/10/2021 Unknown        Severe protein-calorie malnutrition ([facility] ICD-10-CM: E43  ICD-9-CM: 262  11/10/2021 Unknown        Unintentional weight loss ICD-10-CM: R63.4  ICD-9-CM: 783.21  11/10/2021 Unknown        COPD with acute exacerbation ([facility] ICD-10-CM: J44.1  ICD-9-CM: 491.21  2021 Unknown        Acute respiratory failure with hypoxia ([facility] ICD-10-CM: J96.01  ICD-9-CM: 518.81  2021 Unknown        Hypertension ICD-10-CM: I10  ICD-9-CM: 401.9  2021 Unknown        Tobacco abuse ICD-10-CM: Z72.0  ICD-9-CM: 305.1  2021 Unknown        Osteoporosis ICD-10-CM: M81.0  ICD-9-CM: 733.00  2021 Unknown        Community acquired pneumonia ICD-10-CM: J18.9  ICD-9-CM: 384  2021 Unknown        Bronchiectasis ([facility] ICD-10-CM: J47.9  ICD-9-CM: 494.0  2021 Unknown                   [x] High complexity decision making was performed  [x] See my orders for details      Subjective/Initial History:     I was asked by Odalys Enciso MD to see Chayo Campoverde  a 77 y.o.    female in consultation     Excerpts from admission 2021 or consult notes as follows:   56-year-old lady came in because of shortness of breath and dyspnea she has significant past medical history of COPD hypertension and osteoporosis she is a chronic smoker quit smoking this morning before coming to the hospital. She was complaining about dyspnea and cough seen by primary care physician recommended prednisone did not seem to help saturation was in the 80s she was put on oxygen 5 with a nasal cannula she is not on any oxygen at home CAT scan of the chest was done which shows right middle lobe atelectasis and 3 mm left upper lobe lung nodule negative, embolism so admitted and pulmonary consult was called for further evaluation. No Known Allergies     MAR reviewed and pertinent medications noted or modified as needed     Current Facility-Administered Medications   Medication    predniSONE (DELTASONE) tablet 20 mg    metoprolol (LOPRESSOR) injection 5 mg    amLODIPine (NORVASC) tablet 5 mg    aspirin chewable tablet 81 mg    ergocalciferol capsule 50,000 Units    pravastatin (PRAVACHOL) tablet 10 mg    azithromycin (ZITHROMAX) 500 mg in 0.9% sodium chloride 250 mL (VIAL-MATE)    guaiFENesin (ROBITUSSIN) 100 mg/5 mL oral liquid 400 mg    famotidine (PF) (PEPCID) 20 mg in 0.9% sodium chloride 10 mL injection    thiamine (B-1) injection 100 mg    sodium chloride (NS) flush 5-40 mL    sodium chloride (NS) flush 5-40 mL    acetaminophen (TYLENOL) tablet 650 mg    Or    acetaminophen (TYLENOL) suppository 650 mg    polyethylene glycol (MIRALAX) packet 17 g    bisacodyL (DULCOLAX) tablet 5 mg    ondansetron (ZOFRAN ODT) tablet 4 mg    Or    ondansetron (ZOFRAN) injection 4 mg    nicotine (NICODERM CQ) 7 mg/24 hr patch 1 Patch    guaiFENesin ER (MUCINEX) tablet 1,200 mg    benzonatate (TESSALON) capsule 200 mg    multivitamin with folic acid (ONE DAILY WITH FOLIC ACID) tablet 1 Tablet    tiotropium bromide (SPIRIVA RESPIMAT) 2.5 mcg /actuation    budesonide-formoteroL (SYMBICORT) 160-4.5 mcg/actuation HFA inhaler 2 Puff      Patient PCP: Praveen Raymond MD  PMH:  has a past medical history of COPD (chronic obstructive pulmonary disease) ([address] Essential hypertension, Family history of skin cancer, Osteoporosis, and Tobacco abuse. PSH:   has a past surgical history that includes hx mohs procedure (02/28/2018). FHX: family history includes Hypertension in her mother.    SHX: reports that she has been smoking. She started smoking about 60 years ago. She has a 60.00 pack-year smoking history. She has never used smokeless tobacco.     ROS:    Review of Systems   Constitutional: Positive for malaise/fatigue and weight loss. HENT: Negative. Eyes: Negative. Respiratory: Positive for cough, sputum production, shortness of breath and wheezing. Cardiovascular: Positive for chest pain and orthopnea. Gastrointestinal: Negative. Genitourinary: Negative. Musculoskeletal: Negative. Skin: Negative. Neurological: Negative. Psychiatric/Behavioral: Negative. Objective:     Vital Signs: Telemetry:    normal sinus rhythm Intake/Output:   Visit Vitals  BP (!) 155/67 (BP 1 Location: Right upper arm, BP Patient Position: At rest)   Pulse 71   Temp 97.4 °F (36.3 °C)   Resp 17   Ht 5' 4\" (1.626 m)   Wt 36.3 kg (80 lb)   SpO2 95%   BMI 13.73 kg/m²       Temp (24hrs), Av.7 °F (36.5 °C), Min:97.4 °F (36.3 °C), Max:98 °F (36.7 °C)        O2 Device: Nasal cannula O2 Flow Rate (L/min): 3 l/min       Wt Readings from Last 4 Encounters:   21 36.3 kg (80 lb)   21 45.4 kg (100 lb)          Intake/Output Summary (Last 24 hours) at 2021 1000  Last data filed at 2021 0342  Gross per 24 hour   Intake    Output 300 ml   Net -300 ml       Last shift:      No intake/output data recorded. Last 3 shifts: 11/10 1901 -  0700  In: -   Out: 36 [Urine:820]       Physical Exam:     Physical Exam  Constitutional:       Appearance: Normal appearance. She is ill-appearing and diaphoretic. HENT:      Head: Normocephalic and atraumatic. Nose: Nose normal.      Mouth/Throat:      Mouth: Mucous membranes are moist.   Eyes:      Pupils: Pupils are equal, round, and reactive to light. Cardiovascular:      Rate and Rhythm: Normal rate and regular rhythm. Pulses: Normal pulses. Pulmonary:      Effort: Respiratory distress present.       Breath sounds: Wheezing and rhonchi present. Abdominal:      General: Abdomen is flat. Bowel sounds are normal.      Palpations: Abdomen is soft. Musculoskeletal:         General: Normal range of motion. Cervical back: Normal range of motion and neck supple. Skin:     General: Skin is warm. Neurological:      General: No focal deficit present. Mental Status: She is alert. Psychiatric:         Mood and Affect: Mood normal.          Labs:    Recent Labs     11/12/21 0625 11/11/21  0700 11/10/21  0651   WBC 15.1* 11.9* 8.5   HGB 13.2 13.9 14.5    361 404*     Recent Labs     11/12/21 0625 11/11/21  0700 11/10/21  0651    140 140   K 4.5 4.3 3.7    106 106   CO2 29 27 24   * 134* 142*   BUN 41* 50* 39*   CREA 0.67 0.78 0.77   CA 9.6 10.0 10.5*     No results for input(s): PH, PCO2, PO2, HCO3, FIO2 in the last 72 hours. No results for input(s): CPK, CKNDX, TROIQ in the last 72 hours. No lab exists for component: CPKMB  No results found for: BNPP, BNP   No results found for: CULTNo results found for: TSH, TSHEXT, TSHEXT    Imaging:    CXR Results  (Last 48 hours)    None        Results from Hospital Encounter encounter on 11/09/21    XR SWALLOW [facility] VIDEO    Narrative  Modified barium swallow, 11/10/2021    History: R 13.13    Procedure: This study was performed in conjunction with the speech pathologist.  Fluoroscopic evaluation was performed as the patient ingested various  consistencies of barium. An episode of penetration was seen with thin consistency. No verónica aspiration  occurred. The patient ingested nectar, honey and pudding consistencies without penetration  or aspiration. There was significant hypopharyngeal residue which the patient was not able to  fully clear with repeated swallowing. Fluoroscopy time: 1:28 minutes. Dose: 10 mGy. Number of images: 1. Impression  Penetration with thin consistency. No verónica aspiration. Significant hypopharyngeal residue.       XR CHEST PORT    Narrative  Chest single view. Coarse reticulonodular markings through lungs. Less than 1 cm nodular density  right lung base. No gross interstitial or alveolar pulmonary edema. Cardiac  silhouette within normal limits. Elongated thoracic aorta with atherosclerotic  change. No pneumothorax or sizable pleural effusion. CT chest findings 3/4/2021 noted. Consider CT follow-up to demonstrate stability. Results from [place] encounter on 11/09/21    CTA CHEST W OR W WO CONT    Narrative  CTA chest.    Axial images are reviewed along with reformatted sagittal/coronal/MIP images. 100 mL Isovue 370 administered. Dose reduction: All CT scans at this facility are performed using dose reduction  optimization techniques as appropriate to a performed exam including the  following-  automated exposure control, adjustments of mA and/or Kv according to patient  size, or use of iterative reconstructive technique. Apical pleural-parenchymal change. Emphysema. 3 mm lingular LINA nodule. Focal RML atelectasis; this likely accounts for recent plain film finding. Minor posteromedial basilar subsegmental atelectasis. Enhanced images reveal advanced atherosclerotic change thoracic aorta with  extension and great vessels. Normal caliber pulmonary arterial trunk. Normal  contrast opacification pulmonary arterial trunk and its branch vessels; no CT  evidence for PE. Central dependent bronchi are incompletely aerated suggesting mucous content. No  mediastinal lymphadenopathy. No pericardial effusion. Atherosclerosis continues into the imaged upper abdomen. Impression  Emphysema. RML focal atelectasis. Minor posteromedial basilar subsegmental atelectasis. 3 mm LINA nodule. No CT evidence for PE. Thoracoabdominal aorta atherosclerosis. Likely mucous central dependent bronchi. IMPRESSION:   1. Acute hypoxic respiratory failure  2. Chronic Obstructive Pulmonary Disease   3.  Left upper lobe 3 mm lung nodule  4. Bronchiectasis  5. Community-acquired pneumonia  6. COPD cachexia      RECOMMENDATIONS/PLAN:   1. On 4 liter  nasal Cannula oxygen as salvage oxygen delivery device to provide high concentration of oxygen to overcome refractory hypoxia;  2. Patient on  nebulizer treatment and prednisone  3. for PEG tube placement today  4. Start wean oxygen per protocol  5. Patient on Zithromax and Zosyn  6.  Nebulizer treatment along with Symbicort and Alice Argueta MD

## 2021-11-12 NOTE — PROGRESS NOTES
Patient going down for EGD with possible PEG placement today. [facility] discharge and [facility] to deliver rolling walker. For 3 in 1 commode, will need documentation stating that patient is confined to floor of house without access to bathroom or patient is confined to room of house without access to bathroom. DC plan is home with [facility] home health. Updates sent through [facility].

## 2021-11-12 NOTE — PROGRESS NOTES
Problem: Anxiety  Goal: *Alleviation of anxiety (Palliative Care)  Outcome: Progressing Towards Goal     Problem: Patient Education: Go to Patient Education Activity  Goal: Patient/Family Education  Outcome: Progressing Towards Goal

## 2021-11-12 NOTE — ANESTHESIA PREPROCEDURE EVALUATION
Relevant Problems   RESPIRATORY SYSTEM   (+) COPD with acute exacerbation (HCC)   (+) Community acquired pneumonia      CARDIOVASCULAR   (+) Hypertension       Anesthetic History   No history of anesthetic complications            Review of Systems / Medical History  Patient summary reviewed, nursing notes reviewed and pertinent labs reviewed    Pulmonary    COPD: moderate      Shortness of breath and smoker (1ppd x 60 yr)      Comments: Hypoxic respiratory failure on home O2 4-5L   Neuro/Psych   Within defined limits           Cardiovascular    Hypertension                Comments: Sinus rhythm with Premature atrial complexes   Left ventricular hypertrophy with repolarization abnormality   Abnormal ECG   No previous ECGs available   Confirmed by Agustina Lilly MD, Kacie Peña ([id number]) on 11/9/2021 8:29:42 AM    GI/Hepatic/Renal  Within defined limits              Endo/Other  Within defined limits           Other Findings   Comments: Dysphagia  Failure to thrive  Malnutrition         Physical Exam    Airway  Mallampati: III  TM Distance: 4 - 6 cm  Neck ROM: normal range of motion   Mouth opening: Diminished (comment)     Cardiovascular    Rhythm: regular  Rate: normal         Dental    Dentition: Poor dentition     Pulmonary      Decreased breath sounds: bilateral           Abdominal  GI exam deferred       Other Findings            Anesthetic Plan    ASA: 3  Anesthesia type: total IV anesthesia and MAC          Induction: Intravenous  Anesthetic plan and risks discussed with: Patient and Family

## 2021-11-12 NOTE — PROGRESS NOTES
Patient declined EGD and PEG yesterday until speaking with family. She is NPO for EGD w/ possible PEG placement today. Will cont to follow.

## 2021-11-12 NOTE — PROGRESS NOTES
PHYSICAL THERAPY TREATMENT  Patient: Lizette Augustin (88 y.o. female)  Date: 11/12/2021  Diagnosis: Acute respiratory failure with hypoxia (HCC) [J96.01]  COPD with acute exacerbation (HCC) [J44.1]   <principal problem not specified>  Procedure(s) (LRB):  ESOPHAGOGASTRODUODENOSCOPY (EGD) (N/A)  PERCUTANEOUS ENDOSCOPIC GASTROSTOMY TUBE INSERTION (N/A) Day of Surgery  Precautions:    Chart, physical therapy assessment, plan of care and goals were reviewed. ASSESSMENT  Patient continues with skilled PT services and is progressing towards goals. Patient improving with overall mobility and was able to increase gait distance today. Patient was mod I for bed mobility, SBA tranfers, and CGA ambulation. Pt amb with RW and req cues for technique and posture with use of RW. Patient demos no LOB and increased distance to approx 60 ft in the room and SpO2 post session 92-95%. Tolerated LE therex well sitting EOB prior to ambulation today. Progressing well overall and motivated for therapy session. Rec d/c home with HHPT and RW. Current Level of Function Impacting Discharge (mobility/balance): weakness, SOB    Other factors to consider for discharge: assistance at home         PLAN :  Patient continues to benefit from skilled intervention to address the above impairments. Continue treatment per established plan of care. to address goals. Recommendation for discharge: (in order for the patient to meet his/her long term goals)  Home with [address] [place] [address]    This discharge recommendation:  Has been made in collaboration with the attending provider and/or case management    IF patient discharges home will need the following DME: rolling walker       SUBJECTIVE:   Patient stated I am a little tired.     OBJECTIVE DATA SUMMARY:   Critical Behavior:  Neurologic State: Alert  Orientation Level: Oriented X4  Cognition: Follows commands  Safety/Judgement: Decreased awareness of need for safety, Decreased insight into deficits  Functional Mobility Training:  Bed Mobility:  Rolling: Modified independent  Supine to Sit: Modified independent  Sit to Supine: Modified independent  Scooting: Modified independent        Transfers:  Sit to Stand: Stand-by assistance  Stand to Sit: Stand-by assistance       Balance:  Sitting: Intact; Without support  Standing: Impaired; Without support  Standing - Static: Good; Constant support  Standing - Dynamic : Good; Constant support  Ambulation/Gait Training:  Distance (ft): 60 Feet (ft)  Assistive Device: Gait belt; Walker, rolling  Ambulation - Level of Assistance: Contact guard assistance  Base of Support: Narrowed  Step Length: Right shortened; Left shortened      Therapeutic Exercises:   20x LAQ, marches, AP    Pain Ratin/10    Activity Tolerance:   Fair, requires rest breaks, and observed SOB with activity  Please refer to the flowsheet for vital signs taken during this treatment. After treatment patient left in no apparent distress:   Supine in bed and Call bell within reach    COMMUNICATION/COLLABORATION:   The patients plan of care was discussed with: Registered nurse. Cayla Nava   Time Calculation: 15 mins         Problem: Mobility Impaired (Adult and Pediatric)  Goal: *Acute Goals and Plan of Care (Insert Text)  Description: Patient will move from supine to sit and sit to supine , scoot up and down, and roll side to side in bed with independence within 7 day(s). Patient will transfer from bed to chair and chair to bed with independence using the least restrictive device within 7 day(s). Patient will improve static standing balance to independence within 1 week(s). Patient will ambulate 40 feet with independence with least restrictive device within 1 weeks.        Outcome: Progressing Towards Goal

## 2021-11-13 NOTE — PROGRESS NOTES
Problem: Anxiety  Goal: *Alleviation of anxiety  Outcome: Progressing Towards Goal     Problem: Patient Education: Go to Patient Education Activity  Goal: Patient/Family Education  Outcome: Progressing Towards Goal     Problem: Chronic Obstructive Pulmonary Disease (COPD)  Goal: *Oxygen saturation during activity within specified parameters  Outcome: Progressing Towards Goal

## 2021-11-13 NOTE — PROGRESS NOTES
Jevity 1.5 at 25ml/hr continuous and q4 water flush every 4 hours initiated per order. At present, patient tolerates well. No issues or concerns noted. Upon site assessment, patient noted to have old blood tinged drainage. New split guaze in place. Patient c/o minimal pain at peg site. Nursing will continue to monitor.

## 2021-11-13 NOTE — PROGRESS NOTES
Hospitalist Progress Note    Subjective:   Daily Progress Note: 11/13/2021 11:57 AM    Hospital Course:     Jace Rocha is a 77 y.o. female who has PMH significant for HTN, COPD, osteoporosis and chronic smoker. Brought into the emergency room for progressive shortness of breath with associated cough and weakness. Diagnosed by PCP 1 week PTA with bronchitis and placed on prednisone. Symptoms not improving. In the emergency room she is hypoxic on room air and placed on 5 L nasal cannula for saturations of 91%. She is then, frail, failure to thrive malnourished. She lives with family otherwise independent of ADLs. CT of chest revealing right middle lobe focal atelectasis, segmental basilar atelectasis, mucus likely central dependent bronchi, 3 mm left upper lobe nodule. Negative for PE. Significant labs on admission , calcium 10.3, WBC 11.8, D-dimer 1.03. Will test for COVID-19. Will admit for further management respiratory failure with hypoxia, COPD exacerbation, pneumonia and bronchiectasis, failure to thrive, weight loss, dysphagia. Will consult pulmonary for recommendations. Will start on azithromycin, folic acid, thiamine, IV Solu-Medrol, Symbicort and Spiriva, Mucinex and Tessalon. Patient has also had 15 pound weight loss and inability to swallow effectively. GI consulted for EGD evaluation and PEG tube placement if required. Nutrition consulted for recommendations. 11/12/2021 EGD performed revealing large duodenal ulcer and gastric gastritis. PEG tube placed. Initiated continuous tube feeds awaiting nutrition recommendations. Subjective: Follow-up examination patient at the bedside. Tolerating tube feeds.   No complaints    Current Facility-Administered Medications   Medication Dose Route Frequency    predniSONE (DELTASONE) tablet 20 mg  20 mg Oral BID WITH MEALS    metoprolol (LOPRESSOR) injection 5 mg  5 mg IntraVENous Q6H PRN    amLODIPine (NORVASC) tablet 5 mg  5 mg Oral DAILY    aspirin chewable tablet 81 mg  81 mg Oral DAILY    ergocalciferol capsule 50,000 Units  50,000 Units Oral Q7D    pravastatin (PRAVACHOL) tablet 10 mg  10 mg Oral QPM    azithromycin (ZITHROMAX) 500 mg in 0.9% sodium chloride 250 mL (VIAL-MATE)  500 mg IntraVENous Q24H    guaiFENesin (ROBITUSSIN) 100 mg/5 mL oral liquid 400 mg  400 mg Oral Q6H    famotidine (PF) (PEPCID) 20 mg in 0.9% sodium chloride 10 mL injection  20 mg IntraVENous DAILY    thiamine (B-1) injection 100 mg  100 mg IntraMUSCular DAILY    sodium chloride (NS) flush 5-40 mL  5-40 mL IntraVENous Q8H    sodium chloride (NS) flush 5-40 mL  5-40 mL IntraVENous PRN    acetaminophen (TYLENOL) tablet 650 mg  650 mg Oral Q6H PRN    Or    acetaminophen (TYLENOL) suppository 650 mg  650 mg Rectal Q6H PRN    polyethylene glycol (MIRALAX) packet 17 g  17 g Oral DAILY PRN    bisacodyL (DULCOLAX) tablet 5 mg  5 mg Oral DAILY PRN    ondansetron (ZOFRAN ODT) tablet 4 mg  4 mg Oral Q6H PRN    Or    ondansetron (ZOFRAN) injection 4 mg  4 mg IntraVENous Q6H PRN    nicotine (NICODERM CQ) 7 mg/24 hr patch 1 Patch  1 Patch TransDERmal DAILY    guaiFENesin ER (MUCINEX) tablet 1,200 mg  1,200 mg Oral Q12H    benzonatate (TESSALON) capsule 200 mg  200 mg Oral TID    multivitamin with folic acid (ONE DAILY WITH FOLIC ACID) tablet 1 Tablet  1 Tablet Oral DAILY    tiotropium bromide (SPIRIVA RESPIMAT) 2.5 mcg /actuation  2 Puff Inhalation DAILY    budesonide-formoteroL (SYMBICORT) 160-4.5 mcg/actuation HFA inhaler 2 Puff  2 Puff Inhalation BID RT        REVIEW OF SYSTEMS    Review of Systems   Constitutional: Positive for malaise/fatigue. HENT: Positive for congestion. Respiratory: Positive for cough and sputum production. Negative for shortness of breath. Cardiovascular: Negative for chest pain. Gastrointestinal: Negative. Genitourinary: Negative. Musculoskeletal: Positive for back pain. Neurological: Positive for weakness. Objective:     Visit Vitals  BP (!) 159/64   Pulse 86   Temp 98.2 °F (36.8 °C)   Resp 24   Ht 5' 4\" (1.626 m)   Wt 36.8 kg (81 lb 3.2 oz)   SpO2 95%   BMI 13.94 kg/m²    O2 Flow Rate (L/min): 2 l/min O2 Device: Nasal cannula    Temp (24hrs), Av.8 °F (36.6 °C), Min:97.3 °F (36.3 °C), Max:98.6 °F (37 °C)      No intake/output data recorded.  190 -  0700  In: 75   Out: 500 [Urine:500]    PHYSICAL EXAM:    Physical Exam  Constitutional:       Appearance: She is ill-appearing. Comments: Severe protein calorie cachexia   HENT:      Nose: Congestion present. Cardiovascular:      Rate and Rhythm: Normal rate and regular rhythm. Pulmonary:      Effort: No respiratory distress. Skin:     General: Skin is dry. Comments: Poor skin turgor   Neurological:      Mental Status: She is oriented to person, place, and time. Motor: Weakness present. Data Review    No results found for this or any previous visit (from the past 24 hour(s)). XR SWALLOW FUNC VIDEO   Final Result   Penetration with thin consistency. No verónica aspiration. Significant hypopharyngeal residue. CTA CHEST W OR W WO CONT   Final Result   Emphysema. RML focal atelectasis. Minor posteromedial basilar subsegmental atelectasis. 3 mm LINA nodule. No CT evidence for PE. Thoracoabdominal aorta atherosclerosis. Likely mucous central dependent bronchi.       XR CHEST PORT   Final Result          Active Problems:    COPD with acute exacerbation ([address] (2021)      Acute respiratory failure with hypoxia ([address] (2021)      Hypertension (2021)      Tobacco abuse (2021)      Osteoporosis (2021)      Community acquired pneumonia (2021)      Bronchiectasis ([address] (2021)      Dysphagia (11/10/2021)      Failure to thrive (child) (11/10/2021)      Severe protein-calorie malnutrition ([address] (11/10/2021)      Unintentional weight loss (11/10/2021)        Assessment/Plan: Pneumonia community-acquired  Bronchiectasis  COPD exacerbation  Respiratory failure with hypoxia  Test for COVID-19 pending  Chest CT negative for PE, positive for RML focal atelectasis, segmental basilar atelectasis, mucus likely in central dependent bronchi and 3 mm LINA nodule  Chronic smoker, not on home inhalers  We will start on Zithromax, IV Solu-Medrol, Mucinex and Tessalon  We will start Symbicort and Spiriva neb  Consult pulmonary     Hypertension  Continue home medication metoprolol 50 mg twice daily, amlodipine 5 mg/day     Osteoporosis with chronic back pain  Continue home medication vitamin D3, Boniva     Tobacco abuse  Counseled concerning risks associated with continued tobacco abuse  Initiated nicotine replacement therapy     Failure to thrive  Malnutritionsevere  Dysphagia  Unintentional weight loss  We will consult nutrition  Add folic acid and thiamine  Speech recommending MBSpending  11/12/2021 EGD revealing large duodenal ulcer and gastritis nonbleeding  PEG tube placed  Tube feeds initiated      DVT Prophylaxis: Lovenox  Code Status: Full Code  POA/NOK: Daughters    Disposition and discharge barriers:   · Tube feed goals per nutrition  · Treatment for CAP  · Wean oxygen  Care Plan discussed with: Patient, daughter, RN, IDR team  _____________________________________________________________________________  Time spent in direct care including coordination of service, review of data and examination: > 35 minutes    ______________________________________________________________________________    Caroline Olson NP    This is dictation was done by dragon, computer voice recognition software. Quite often unanticipated grammatical, syntax, homophones and other interpretive errors or inadvertently transcribed by the computer software. Please excuse errors that have escaped final proofreading. Thank you.

## 2021-11-13 NOTE — PROGRESS NOTES
PULMONARY NOTE  [facility]    Name: Shreyas Becker MRN: [id number]   : 1945 Hospital: [facility]   Date: 2021  Admission date: 2021 Hospital Day: 5       HPI:     Hospital Problems  Date Reviewed: 2021          Codes Class Noted POA    Dysphagia ICD-10-CM: R13.10  ICD-9-CM: 787.20  11/10/2021 Unknown        Failure to thrive (child) ICD-10-CM: R62.51  ICD-9-CM: 783.41  11/10/2021 Unknown        Severe protein-calorie malnutrition ([facility] ICD-10-CM: E43  ICD-9-CM: 262  11/10/2021 Unknown        Unintentional weight loss ICD-10-CM: R63.4  ICD-9-CM: 783.21  11/10/2021 Unknown        COPD with acute exacerbation ([facility] ICD-10-CM: J44.1  ICD-9-CM: 491.21  2021 Unknown        Acute respiratory failure with hypoxia (HCC) ICD-10-CM: J96.01  ICD-9-CM: 518.81  2021 Unknown        Hypertension ICD-10-CM: I10  ICD-9-CM: 401.9  2021 Unknown        Tobacco abuse ICD-10-CM: Z72.0  ICD-9-CM: 305.1  2021 Unknown        Osteoporosis ICD-10-CM: M81.0  ICD-9-CM: 733.00  2021 Unknown        Community acquired pneumonia ICD-10-CM: J18.9  ICD-9-CM: 506  2021 Unknown        Bronchiectasis ([facility] ICD-10-CM: J47.9  ICD-9-CM: 494.0  2021 Unknown                   [x] High complexity decision making was performed  [x] See my orders for details      Subjective/Initial History:     I was asked by Fidel Vanessa MD to see Shreyas Becker  a 77 y.o.    female in consultation     Excerpts from admission 2021 or consult notes as follows:   61-year-old lady came in because of shortness of breath and dyspnea she has significant past medical history of COPD hypertension and osteoporosis she is a chronic smoker quit smoking this morning before coming to the hospital. She was complaining about dyspnea and cough seen by primary care physician recommended prednisone did not seem to help saturation was in the 80s she was put on oxygen 5 with a nasal cannula she is not on any oxygen at home CAT scan of the chest was done which shows right middle lobe atelectasis and 3 mm left upper lobe lung nodule negative, embolism so admitted and pulmonary consult was called for further evaluation. No Known Allergies     MAR reviewed and pertinent medications noted or modified as needed     Current Facility-Administered Medications   Medication    predniSONE (DELTASONE) tablet 20 mg    metoprolol (LOPRESSOR) injection 5 mg    amLODIPine (NORVASC) tablet 5 mg    aspirin chewable tablet 81 mg    ergocalciferol capsule 50,000 Units    pravastatin (PRAVACHOL) tablet 10 mg    azithromycin (ZITHROMAX) 500 mg in 0.9% sodium chloride 250 mL (VIAL-MATE)    guaiFENesin (ROBITUSSIN) 100 mg/5 mL oral liquid 400 mg    famotidine (PF) (PEPCID) 20 mg in 0.9% sodium chloride 10 mL injection    thiamine (B-1) injection 100 mg    sodium chloride (NS) flush 5-40 mL    sodium chloride (NS) flush 5-40 mL    acetaminophen (TYLENOL) tablet 650 mg    Or    acetaminophen (TYLENOL) suppository 650 mg    polyethylene glycol (MIRALAX) packet 17 g    bisacodyL (DULCOLAX) tablet 5 mg    ondansetron (ZOFRAN ODT) tablet 4 mg    Or    ondansetron (ZOFRAN) injection 4 mg    nicotine (NICODERM CQ) 7 mg/24 hr patch 1 Patch    guaiFENesin ER (MUCINEX) tablet 1,200 mg    benzonatate (TESSALON) capsule 200 mg    multivitamin with folic acid (ONE DAILY WITH FOLIC ACID) tablet 1 Tablet    tiotropium bromide (SPIRIVA RESPIMAT) 2.5 mcg /actuation    budesonide-formoteroL (SYMBICORT) 160-4.5 mcg/actuation HFA inhaler 2 Puff      Patient PCP: Krysta Nunez MD  PMH:  has a past medical history of COPD (chronic obstructive pulmonary disease) ([address] Essential hypertension, Family history of skin cancer, Osteoporosis, and Tobacco abuse. PSH:   has a past surgical history that includes hx mohs procedure (02/28/2018). FHX: family history includes Hypertension in her mother.    SHX: reports that she has been smoking. She started smoking about 60 years ago. She has a 60.00 pack-year smoking history. She has never used smokeless tobacco.     ROS:    Review of Systems   Constitutional: Positive for malaise/fatigue and weight loss. HENT: Negative. Eyes: Negative. Respiratory: Positive for cough, sputum production, shortness of breath and wheezing. Cardiovascular: Positive for chest pain and orthopnea. Gastrointestinal: Negative. Genitourinary: Negative. Musculoskeletal: Negative. Skin: Negative. Neurological: Negative. Psychiatric/Behavioral: Negative. Objective:     Vital Signs: Telemetry:    normal sinus rhythm Intake/Output:   Visit Vitals  BP (!) 159/64   Pulse 86   Temp 98.2 °F (36.8 °C)   Resp 24   Ht 5' 4\" (1.626 m)   Wt 36.8 kg (81 lb 3.2 oz)   SpO2 95%   BMI 13.94 kg/m²       Temp (24hrs), Av.8 °F (36.6 °C), Min:97.3 °F (36.3 °C), Max:98.6 °F (37 °C)        O2 Device: Nasal cannula O2 Flow Rate (L/min): 2 l/min       Wt Readings from Last 4 Encounters:   21 36.8 kg (81 lb 3.2 oz)   21 45.4 kg (100 lb)          Intake/Output Summary (Last 24 hours) at 2021 0915  Last data filed at 2021 0118  Gross per 24 hour   Intake 75 ml   Output 200 ml   Net -125 ml       Last shift:      No intake/output data recorded. Last 3 shifts:  1901 -  0700  In: 75   Out: 500 [Urine:500]       Physical Exam:     Physical Exam  Constitutional:       Appearance: Normal appearance. She is ill-appearing and diaphoretic. HENT:      Head: Normocephalic and atraumatic. Nose: Nose normal.      Mouth/Throat:      Mouth: Mucous membranes are moist.   Eyes:      Pupils: Pupils are equal, round, and reactive to light. Cardiovascular:      Rate and Rhythm: Normal rate and regular rhythm. Pulses: Normal pulses. Pulmonary:      Effort: Respiratory distress present. Breath sounds: Wheezing and rhonchi present.    Abdominal: General: Abdomen is flat. Bowel sounds are normal.      Palpations: Abdomen is soft. Musculoskeletal:         General: Normal range of motion. Cervical back: Normal range of motion and neck supple. Skin:     General: Skin is warm. Neurological:      General: No focal deficit present. Mental Status: She is alert. Psychiatric:         Mood and Affect: Mood normal.          Labs:    Recent Labs     11/12/21  0625 11/11/21  0700   WBC 15.1* 11.9*   HGB 13.2 13.9    361     Recent Labs     11/12/21  0625 11/11/21  0700    140   K 4.5 4.3    106   CO2 29 27   * 134*   BUN 41* 50*   CREA 0.67 0.78   CA 9.6 10.0     No results for input(s): PH, PCO2, PO2, HCO3, FIO2 in the last 72 hours. No results for input(s): CPK, CKNDX, TROIQ in the last 72 hours. No lab exists for component: CPKMB  No results found for: BNPP, BNP   No results found for: CULTNo results found for: TSH, TSHEXT, TSHEXT    Imaging:    CXR Results  (Last 48 hours)    None        Results from Hospital Encounter encounter on 11/09/21    XR SWALLOW FUNC VIDEO    Narrative  Modified barium swallow, 11/10/2021    History: R 13.13    Procedure: This study was performed in conjunction with the speech pathologist.  Fluoroscopic evaluation was performed as the patient ingested various  consistencies of barium. An episode of penetration was seen with thin consistency. No verónica aspiration  occurred. The patient ingested nectar, honey and pudding consistencies without penetration  or aspiration. There was significant hypopharyngeal residue which the patient was not able to  fully clear with repeated swallowing. Fluoroscopy time: 1:28 minutes. Dose: 10 mGy. Number of images: 1. Impression  Penetration with thin consistency. No verónica aspiration. Significant hypopharyngeal residue. XR CHEST PORT    Narrative  Chest single view. Coarse reticulonodular markings through lungs.  Less than 1 cm nodular density  right lung base. No gross interstitial or alveolar pulmonary edema. Cardiac  silhouette within normal limits. Elongated thoracic aorta with atherosclerotic  change. No pneumothorax or sizable pleural effusion. CT chest findings 3/4/2021 noted. Consider CT follow-up to demonstrate stability. Results from [place] encounter on 11/09/21    CTA CHEST W OR W WO CONT    Narrative  CTA chest.    Axial images are reviewed along with reformatted sagittal/coronal/MIP images. 100 mL Isovue 370 administered. Dose reduction: All CT scans at this facility are performed using dose reduction  optimization techniques as appropriate to a performed exam including the  following-  automated exposure control, adjustments of mA and/or Kv according to patient  size, or use of iterative reconstructive technique. Apical pleural-parenchymal change. Emphysema. 3 mm lingular LINA nodule. Focal RML atelectasis; this likely accounts for recent plain film finding. Minor posteromedial basilar subsegmental atelectasis. Enhanced images reveal advanced atherosclerotic change thoracic aorta with  extension and great vessels. Normal caliber pulmonary arterial trunk. Normal  contrast opacification pulmonary arterial trunk and its branch vessels; no CT  evidence for PE. Central dependent bronchi are incompletely aerated suggesting mucous content. No  mediastinal lymphadenopathy. No pericardial effusion. Atherosclerosis continues into the imaged upper abdomen. Impression  Emphysema. RML focal atelectasis. Minor posteromedial basilar subsegmental atelectasis. 3 mm LINA nodule. No CT evidence for PE. Thoracoabdominal aorta atherosclerosis. Likely mucous central dependent bronchi. IMPRESSION:   1. Acute hypoxic respiratory failure  2. Chronic Obstructive Pulmonary Disease   3. Left upper lobe 3 mm lung nodule  4. Bronchiectasis  5. Community-acquired pneumonia  6.  COPD cachexia      RECOMMENDATIONS/PLAN: 1. On 2 liter nasal Cannula oxygen   2. Patient on nebulizer treatment and prednisone  3. S/P PEG tube placement  4. Start wean oxygen per protocol  5. Patient on Zithromax   6.  Nebulizer treatment along with Symbicort and Efrain Quinones MD

## 2021-11-14 NOTE — PROGRESS NOTES
Progress Note    Patient: Pamella Rojas MRN: [id number]  SSN: [id number]    YOB: 1945  Age: 77 y.o.   Sex: female      Admit Date: 11/9/2021    LOS: 4 days     Subjective:   Patient examined,  Had peg placement yesterday  No pain or nausea  Past Medical History:   Diagnosis Date    COPD (chronic obstructive pulmonary disease) ([facility]     Essential hypertension     Family history of skin cancer     Osteoporosis     Tobacco abuse         Current Facility-Administered Medications:     predniSONE (DELTASONE) tablet 20 mg, 20 mg, Oral, BID WITH MEALS, Manuelito Lopez MD, 20 mg at 11/13/21 1758    metoprolol (LOPRESSOR) injection 5 mg, 5 mg, IntraVENous, Q6H PRN, Akil Graves NP, 5 mg at 11/12/21 1632    amLODIPine (NORVASC) tablet 5 mg, 5 mg, Oral, DAILY, Akil Graves NP, 5 mg at 11/13/21 0950    aspirin chewable tablet 81 mg, 81 mg, Oral, DAILY, Akil Graves NP, 81 mg at 11/13/21 0950    ergocalciferol capsule 50,000 Units, 50,000 Units, Oral, Q7D, Akil Graves NP    pravastatin (PRAVACHOL) tablet 10 mg, 10 mg, Oral, QPM, Akil Graves NP, 10 mg at 11/13/21 1800    azithromycin (ZITHROMAX) 500 mg in 0.9% sodium chloride 250 mL (VIAL-MATE), 500 mg, IntraVENous, Q24H, Akil Graves NP, Last Rate: 250 mL/hr at 11/13/21 1335, 500 mg at 11/13/21 1335    guaiFENesin (ROBITUSSIN) 100 mg/5 mL oral liquid 400 mg, 400 mg, Oral, Q6H, Akil Graves NP, 400 mg at 11/13/21 1757    famotidine (PF) (PEPCID) 20 mg in 0.9% sodium chloride 10 mL injection, 20 mg, IntraVENous, DAILY, Akil Graves NP, 20 mg at 11/13/21 0951    thiamine (B-1) injection 100 mg, 100 mg, IntraMUSCular, DAILY, Akil Graves NP, 100 mg at 11/13/21 0900    sodium chloride (NS) flush 5-40 mL, 5-40 mL, IntraVENous, Q8H, Akil Graves NP, 10 mL at 11/13/21 1342    sodium chloride (NS) flush 5-40 mL, 5-40 mL, IntraVENous, PRN, Akil Graves NP    acetaminophen (TYLENOL) tablet 650 mg, 650 mg, Oral, Q6H PRN, 650 mg at 11/13/21 1554 **OR** acetaminophen (TYLENOL) suppository 650 mg, 650 mg, Rectal, Q6H PRN, Biju Palmer NP    polyethylene glycol (MIRALAX) packet 17 g, 17 g, Oral, DAILY PRN, Biju Palmer NP    bisacodyL (DULCOLAX) tablet 5 mg, 5 mg, Oral, DAILY PRN, Biju Palmer NP    ondansetron (ZOFRAN ODT) tablet 4 mg, 4 mg, Oral, Q6H PRN **OR** ondansetron (ZOFRAN) injection 4 mg, 4 mg, IntraVENous, Q6H PRN, SHIRA Mukherjee  nicotine (NICODERM CQ) 7 mg/24 hr patch 1 Patch, 1 Patch, TransDERmal, DAILY, Biju Palmer NP, 1 Patch at 11/13/21 0287    guaiFENesin ER (MUCINEX) tablet 1,200 mg, 1,200 mg, Oral, Q12H, Biju Palmer NP, 1,200 mg at 11/13/21 2110    benzonatate (TESSALON) capsule 200 mg, 200 mg, Oral, TID, Biju Palmer NP, 200 mg at 11/13/21 1600    multivitamin with folic acid (ONE DAILY WITH FOLIC ACID) tablet 1 Tablet, 1 Tablet, Oral, DAILY, Biju Palmer NP, 1 Tablet at 11/13/21 0950    tiotropium bromide (SPIRIVA RESPIMAT) 2.5 mcg Dario Goran, 2 Jose Alfredo Click, Inhalation, DAILY, Biju Palmer NP, 2 Puff at 11/13/21 0846    budesonide-formoteroL (SYMBICORT) 160-4.5 mcg/actuation HFA inhaler 2 Puff, 2 Puff, Inhalation, BID RT, Biju Palmer NP, 2 Puff at 11/1945    Objective:     Vitals:    11/13/21 0742 11/13/21 0850 11/13/21 1526 11/1945   BP: (!) 159/64  (!) 156/72    Pulse: 86  92    Resp: 24  20    Temp: 98.2 °F (36.8 °C)  97.9 °F (36.6 °C)    SpO2: 94% 95% 93% 95%   Weight:       Height:            Intake and Output:  Current Shift: No intake/output data recorded. Last three shifts: 11/12 0701 - 11/13 1900  In: 75   Out: 200 [Urine:200]    Physical Exam:   Physical Exam  Constitutional:       Appearance: She is ill-appearing. HENT:      Mouth/Throat:      Mouth: Mucous membranes are dry. Eyes:      Extraocular Movements: Extraocular movements intact. Cardiovascular:      Rate and Rhythm: Regular rhythm. Heart sounds: Normal heart sounds. Abdominal:      General: Abdomen is flat. Comments: Peg site look fine, no drainage   Musculoskeletal:         General: No deformity. Normal range of motion. Cervical back: Normal range of motion. Skin:     Findings: No bruising. Neurological:      General: No focal deficit present. Psychiatric:         Mood and Affect: Mood normal.          Lab/Data Review:  No results found for this or any previous visit (from the past 24 hour(s)). XR SWALLOW FUNC VIDEO   Final Result   Penetration with thin consistency. No verónica aspiration. Significant hypopharyngeal residue. CTA CHEST W OR W WO CONT   Final Result   Emphysema. RML focal atelectasis. Minor posteromedial basilar subsegmental atelectasis. 3 mm LINA nodule. No CT evidence for PE. Thoracoabdominal aorta atherosclerosis. Likely mucous central dependent bronchi.       XR CHEST PORT   Final Result           Assessment:     Active Problems:    COPD with acute exacerbation ([address] (11/9/2021)      Acute respiratory failure with hypoxia ([address] (11/9/2021)      Hypertension (11/9/2021)      Tobacco abuse (11/9/2021)      Osteoporosis (11/9/2021)      Community acquired pneumonia (11/9/2021)      Bronchiectasis ([address] (11/9/2021)      Dysphagia (11/10/2021)      Failure to thrive (child) (11/10/2021)      Severe protein-calorie malnutrition ([address] (11/10/2021)      Unintentional weight loss (11/10/2021)        HVDXAGFUL (11/10/2021)    Failure to thrive (child) (11/10/2021)    Severe protein-calorie malnutrition ([address]     Plan:   Continue on Zithromax, oxygen saturations stable    Continue on tube feeding  sign off  Signed By: Lizy Moreno MD          Plan:       Signed By: Lizy Moreno MD     November 13, 2021        Thank you for allowing me to participate in this patients care  Cc Referring Physician   Manuel Perla MD

## 2021-11-14 NOTE — PROGRESS NOTES
Hospitalist Progress Note    Subjective:   Daily Progress Note: 11/14/2021 11:57 AM    Hospital Course:     Shayla Martinez is a 77 y.o. female who has PMH significant for HTN, COPD, osteoporosis and chronic smoker. Brought into the emergency room for progressive shortness of breath with associated cough and weakness. Diagnosed by PCP 1 week PTA with bronchitis and placed on prednisone. Symptoms not improving. In the emergency room she is hypoxic on room air and placed on 5 L nasal cannula for saturations of 91%. She is then, frail, failure to thrive malnourished. She lives with family otherwise independent of ADLs. CT of chest revealing right middle lobe focal atelectasis, segmental basilar atelectasis, mucus likely central dependent bronchi, 3 mm left upper lobe nodule. Negative for PE. Significant labs on admission , calcium 10.3, WBC 11.8, D-dimer 1.03. Will test for COVID-19. Will admit for further management respiratory failure with hypoxia, COPD exacerbation, pneumonia and bronchiectasis, failure to thrive, weight loss, dysphagia. Will consult pulmonary for recommendations. Will start on azithromycin, folic acid, thiamine, IV Solu-Medrol, Symbicort and Spiriva, Mucinex and Tessalon. Patient has also had 15 pound weight loss and inability to swallow effectively. GI consulted for EGD evaluation and PEG tube placement if required. Nutrition consulted for recommendations. 11/12/2021 EGD performed revealing large duodenal ulcer and gastric gastritis. PEG tube placed. Initiated continuous tube feeds awaiting nutrition recommendations. Subjective: Follow-up examination patient at the bedside. Tolerating tube feeds. Complains of back pain.     Current Facility-Administered Medications   Medication Dose Route Frequency    azithromycin (ZITHROMAX) tablet 500 mg  500 mg Oral DAILY    metoprolol tartrate (LOPRESSOR) tablet 50 mg  50 mg Oral BID    predniSONE (DELTASONE) tablet 20 mg  20 mg Oral BID WITH MEALS    amLODIPine (NORVASC) tablet 5 mg  5 mg Oral DAILY    aspirin chewable tablet 81 mg  81 mg Oral DAILY    ergocalciferol capsule 50,000 Units  50,000 Units Oral Q7D    pravastatin (PRAVACHOL) tablet 10 mg  10 mg Oral QPM    guaiFENesin (ROBITUSSIN) 100 mg/5 mL oral liquid 400 mg  400 mg Oral Q6H    famotidine (PF) (PEPCID) 20 mg in 0.9% sodium chloride 10 mL injection  20 mg IntraVENous DAILY    thiamine (B-1) injection 100 mg  100 mg IntraMUSCular DAILY    sodium chloride (NS) flush 5-40 mL  5-40 mL IntraVENous Q8H    sodium chloride (NS) flush 5-40 mL  5-40 mL IntraVENous PRN    acetaminophen (TYLENOL) tablet 650 mg  650 mg Oral Q6H PRN    Or    acetaminophen (TYLENOL) suppository 650 mg  650 mg Rectal Q6H PRN    polyethylene glycol (MIRALAX) packet 17 g  17 g Oral DAILY PRN    bisacodyL (DULCOLAX) tablet 5 mg  5 mg Oral DAILY PRN    ondansetron (ZOFRAN ODT) tablet 4 mg  4 mg Oral Q6H PRN    Or    ondansetron (ZOFRAN) injection 4 mg  4 mg IntraVENous Q6H PRN    nicotine (NICODERM CQ) 7 mg/24 hr patch 1 Patch  1 Patch TransDERmal DAILY    guaiFENesin ER (MUCINEX) tablet 1,200 mg  1,200 mg Oral Q12H    benzonatate (TESSALON) capsule 200 mg  200 mg Oral TID    multivitamin with folic acid (ONE DAILY WITH FOLIC ACID) tablet 1 Tablet  1 Tablet Oral DAILY    tiotropium bromide (SPIRIVA RESPIMAT) 2.5 mcg /actuation  2 Puff Inhalation DAILY    budesonide-formoteroL (SYMBICORT) 160-4.5 mcg/actuation HFA inhaler 2 Puff  2 Puff Inhalation BID RT        REVIEW OF SYSTEMS    Review of Systems   Constitutional: Positive for malaise/fatigue. HENT: Positive for congestion. Respiratory: Positive for cough and sputum production. Negative for shortness of breath. Cardiovascular: Negative for chest pain. Gastrointestinal: Negative. Genitourinary: Negative. Musculoskeletal: Positive for back pain. Neurological: Positive for weakness.         Objective:     Visit Vitals  BP (!) 175/78 (BP 1 Location: Right upper arm, BP Patient Position: Sitting)   Pulse 85   Temp 97.7 °F (36.5 °C)   Resp 19   Ht 5' 4\" (1.626 m)   Wt 36.8 kg (81 lb 3.2 oz)   SpO2 92%   BMI 13.94 kg/m²    O2 Flow Rate (L/min): 2 l/min O2 Device: None (Room air)    Temp (24hrs), Av.6 °F (36.4 °C), Min:97.3 °F (36.3 °C), Max:97.9 °F (36.6 °C)      No intake/output data recorded.  1901 -  0700  In: 75   Out: 200 [Urine:200]    PHYSICAL EXAM:    Physical Exam  Constitutional:       Appearance: She is ill-appearing. Comments: Severe protein calorie cachexia   HENT:      Nose: Congestion present. Cardiovascular:      Rate and Rhythm: Normal rate and regular rhythm. Pulmonary:      Effort: No respiratory distress. Skin:     General: Skin is dry. Comments: Poor skin turgor   Neurological:      Mental Status: She is oriented to person, place, and time. Motor: Weakness present. Data Review    Recent Results (from the past 24 hour(s))   CBC WITH AUTOMATED DIFF    Collection Time: 21  8:00 AM   Result Value Ref Range    WBC 18.0 (H) 3.6 - 11.0 K/uL    RBC 4.34 3.80 - 5.20 M/uL    HGB 13.3 11.5 - 16.0 g/dL    HCT 39.7 35.0 - 47.0 %    MCV 91.5 80.0 - 99.0 FL    MCH 30.6 26.0 - 34.0 PG    MCHC 33.5 30.0 - 36.5 g/dL    RDW 14.6 (H) 11.5 - 14.5 %    PLATELET 910 946 - 092 K/uL    MPV 11.2 8.9 - 12.9 FL    NRBC 0.0 0.0  WBC    ABSOLUTE NRBC 0.00 0.00 - 0.01 K/uL    NEUTROPHILS 77 (H) 32 - 75 %    LYMPHOCYTES 11 (L) 12 - 49 %    MONOCYTES 12 5 - 13 %    EOSINOPHILS 0 0 - 7 %    BASOPHILS 0 0 - 1 %    IMMATURE GRANULOCYTES 0 0 - 0.5 %    ABS. NEUTROPHILS 13.8 (H) 1.8 - 8.0 K/UL    ABS. LYMPHOCYTES 2.0 0.8 - 3.5 K/UL    ABS. MONOCYTES 2.1 (H) 0.0 - 1.0 K/UL    ABS. EOSINOPHILS 0.0 0.0 - 0.4 K/UL    ABS. BASOPHILS 0.0 0.0 - 0.1 K/UL    ABS. IMM.  GRANS. 0.1 (H) 0.00 - 0.04 K/UL    DF AUTOMATED     METABOLIC PANEL, BASIC    Collection Time: 21  8:00 AM   Result Value Ref Range Sodium 132 (L) 136 - 145 mmol/L    Potassium 4.2 3.5 - 5.1 mmol/L    Chloride 102 97 - 108 mmol/L    CO2 27 21 - 32 mmol/L    Anion gap 3 (L) 5 - 15 mmol/L    Glucose 91 65 - 100 mg/dL    BUN 22 (H) 6 - 20 mg/dL    Creatinine 0.58 0.55 - 1.02 mg/dL    BUN/Creatinine ratio 38 (H) 12 - 20      GFR est AA >60 >60 ml/min/1.73m2    GFR est non-AA >60 >60 ml/min/1.73m2    Calcium 8.9 8.5 - 10.1 mg/dL       XR SWALLOW FUNC VIDEO   Final Result   Penetration with thin consistency. No verónica aspiration. Significant hypopharyngeal residue. CTA CHEST W OR W WO CONT   Final Result   Emphysema. RML focal atelectasis. Minor posteromedial basilar subsegmental atelectasis. 3 mm LINA nodule. No CT evidence for PE. Thoracoabdominal aorta atherosclerosis. Likely mucous central dependent bronchi.       XR CHEST PORT   Final Result          Active Problems:    COPD with acute exacerbation ([address] (11/9/2021)      Acute respiratory failure with hypoxia ([address] (11/9/2021)      Hypertension (11/9/2021)      Tobacco abuse (11/9/2021)      Osteoporosis (11/9/2021)      Community acquired pneumonia (11/9/2021)      Bronchiectasis ([address] (11/9/2021)      Dysphagia (11/10/2021)      Failure to thrive (child) (11/10/2021)      Severe protein-calorie malnutrition ([address] (11/10/2021)      Unintentional weight loss (11/10/2021)        Assessment/Plan:     Pneumonia community-acquired  Bronchiectasis  COPD exacerbation  Respiratory failure with hypoxia  Test for COVID-19 pending  Chest CT negative for PE, positive for RML focal atelectasis, segmental basilar atelectasis, mucus likely in central dependent bronchi and 3 mm LINA nodule  Chronic smoker, not on home inhalers  We will start on Zithromax, IV Solu-Medrol, Mucinex and Tessalon  We will start Symbicort and Spiriva neb  Consult pulmonary     Hypertension  Continue home medication metoprolol 50 mg twice daily, amlodipine 5 mg/day     Osteoporosis with chronic back pain  Continue home medication vitamin D3, Boniva     Tobacco abuse  Counseled concerning risks associated with continued tobacco abuse  Initiated nicotine replacement therapy     Failure to thrive  Malnutritionsevere  Dysphagia  Unintentional weight loss  We will consult nutrition awaiting recommendations for home tube feedings  Add folic acid and thiamine  Speech recommending MBSpending  11/12/2021 EGD revealing large duodenal ulcer and gastritis nonbleeding  PEG tube placed  Tube feeds initiated      DVT Prophylaxis: Lovenox  Code Status: Full Code  POA/NOK: Daughters    Disposition and discharge barriers:   · Tube feed goals per nutrition pending  · Patient and family education on tube feeds  · Treatment for CAP  · Wean oxygen  Care Plan discussed with: Patient, daughter, RN, IDR team  _____________________________________________________________________________  Time spent in direct care including coordination of service, review of data and examination: > 35 minutes    ______________________________________________________________________________    Rhianna Delgado NP    This is dictation was done by dragon, computer voice recognition software. Quite often unanticipated grammatical, syntax, homophones and other interpretive errors or inadvertently transcribed by the computer software. Please excuse errors that have escaped final proofreading. Thank you.

## 2021-11-14 NOTE — PROGRESS NOTES
PULMONARY NOTE  [facility]    Name: Marie Bryson MRN: [id number]   : 1945 Hospital: [facility]   Date: 2021  Admission date: 2021 Hospital Day: 6       HPI:     Hospital Problems  Date Reviewed: 2021          Codes Class Noted POA    Dysphagia ICD-10-CM: R13.10  ICD-9-CM: 787.20  11/10/2021 Unknown        Failure to thrive (child) ICD-10-CM: R62.51  ICD-9-CM: 783.41  11/10/2021 Unknown        Severe protein-calorie malnutrition ([facility] ICD-10-CM: E43  ICD-9-CM: 262  11/10/2021 Unknown        Unintentional weight loss ICD-10-CM: R63.4  ICD-9-CM: 783.21  11/10/2021 Unknown        COPD with acute exacerbation ([facility] ICD-10-CM: J44.1  ICD-9-CM: 491.21  2021 Unknown        Acute respiratory failure with hypoxia (HCC) ICD-10-CM: J96.01  ICD-9-CM: 518.81  2021 Unknown        Hypertension ICD-10-CM: I10  ICD-9-CM: 401.9  2021 Unknown        Tobacco abuse ICD-10-CM: Z72.0  ICD-9-CM: 305.1  2021 Unknown        Osteoporosis ICD-10-CM: M81.0  ICD-9-CM: 733.00  2021 Unknown        Community acquired pneumonia ICD-10-CM: J18.9  ICD-9-CM: 337  2021 Unknown        Bronchiectasis ([facility] ICD-10-CM: J47.9  ICD-9-CM: 494.0  2021 Unknown                   [x] High complexity decision making was performed  [x] See my orders for details      Subjective/Initial History:     I was asked by Starr Navarrete MD to see Marie Part  a 77 y.o.    female in consultation     Excerpts from admission 2021 or consult notes as follows:   71-year-old lady came in because of shortness of breath and dyspnea she has significant past medical history of COPD hypertension and osteoporosis she is a chronic smoker quit smoking this morning before coming to the hospital. She was complaining about dyspnea and cough seen by primary care physician recommended prednisone did not seem to help saturation was in the 80s she was put on oxygen 5 with a nasal cannula she is not on any oxygen at home CAT scan of the chest was done which shows right middle lobe atelectasis and 3 mm left upper lobe lung nodule negative, embolism so admitted and pulmonary consult was called for further evaluation. No Known Allergies     MAR reviewed and pertinent medications noted or modified as needed     Current Facility-Administered Medications   Medication    azithromycin (ZITHROMAX) tablet 500 mg    metoprolol tartrate (LOPRESSOR) tablet 50 mg    [START ON 11/15/2021] predniSONE (DELTASONE) tablet 20 mg    amLODIPine (NORVASC) tablet 5 mg    aspirin chewable tablet 81 mg    ergocalciferol capsule 50,000 Units    pravastatin (PRAVACHOL) tablet 10 mg    guaiFENesin (ROBITUSSIN) 100 mg/5 mL oral liquid 400 mg    famotidine (PF) (PEPCID) 20 mg in 0.9% sodium chloride 10 mL injection    thiamine (B-1) injection 100 mg    sodium chloride (NS) flush 5-40 mL    sodium chloride (NS) flush 5-40 mL    acetaminophen (TYLENOL) tablet 650 mg    Or    acetaminophen (TYLENOL) suppository 650 mg    polyethylene glycol (MIRALAX) packet 17 g    bisacodyL (DULCOLAX) tablet 5 mg    ondansetron (ZOFRAN ODT) tablet 4 mg    Or    ondansetron (ZOFRAN) injection 4 mg    nicotine (NICODERM CQ) 7 mg/24 hr patch 1 Patch    guaiFENesin ER (MUCINEX) tablet 1,200 mg    benzonatate (TESSALON) capsule 200 mg    multivitamin with folic acid (ONE DAILY WITH FOLIC ACID) tablet 1 Tablet    tiotropium bromide (SPIRIVA RESPIMAT) 2.5 mcg /actuation    budesonide-formoteroL (SYMBICORT) 160-4.5 mcg/actuation HFA inhaler 2 Puff      Patient PCP: Paras Cardona MD  PMH:  has a past medical history of COPD (chronic obstructive pulmonary disease) ([address] Essential hypertension, Family history of skin cancer, Osteoporosis, and Tobacco abuse. PSH:   has a past surgical history that includes hx mohs procedure (02/28/2018). FHX: family history includes Hypertension in her mother.    SHX:  reports that she has been smoking. She started smoking about 60 years ago. She has a 60.00 pack-year smoking history. She has never used smokeless tobacco.     ROS:    Review of Systems   Constitutional: Positive for malaise/fatigue and weight loss. HENT: Negative. Eyes: Negative. Respiratory: Positive for cough, sputum production, shortness of breath and wheezing. Cardiovascular: Positive for chest pain and orthopnea. Gastrointestinal: Negative. Genitourinary: Negative. Musculoskeletal: Negative. Skin: Negative. Neurological: Negative. Psychiatric/Behavioral: Negative. Objective:     Vital Signs: Telemetry:    normal sinus rhythm Intake/Output:   Visit Vitals  BP (!) 141/61 (BP 1 Location: Right upper arm, BP Patient Position: Sitting)   Pulse 84   Temp 97.9 °F (36.6 °C)   Resp 19   Ht 5' 4\" (1.626 m)   Wt 36.8 kg (81 lb 3.2 oz)   SpO2 93%   BMI 13.94 kg/m²       Temp (24hrs), Av.6 °F (36.4 °C), Min:97.3 °F (36.3 °C), Max:97.9 °F (36.6 °C)        O2 Device: None (Room air) O2 Flow Rate (L/min): 2 l/min       Wt Readings from Last 4 Encounters:   21 36.8 kg (81 lb 3.2 oz)   21 45.4 kg (100 lb)        No intake or output data in the 24 hours ending 21 1645    Last shift:      No intake/output data recorded. Last 3 shifts:  1901 -  0700  In: 75   Out: 200 [Urine:200]       Physical Exam:     Physical Exam  Constitutional:       Appearance: Normal appearance. She is ill-appearing and diaphoretic. Comments: Thin frail appearing   HENT:      Head: Normocephalic and atraumatic. Nose: Nose normal.      Mouth/Throat:      Mouth: Mucous membranes are moist.   Eyes:      Pupils: Pupils are equal, round, and reactive to light. Cardiovascular:      Rate and Rhythm: Normal rate and regular rhythm. Pulses: Normal pulses.    Pulmonary:      Comments: Poor breath sounds with prolongation of exhalation no wheezes no rales  Abdominal:      General: Abdomen is flat. Bowel sounds are normal.      Palpations: Abdomen is soft. Musculoskeletal:         General: Normal range of motion. Cervical back: Normal range of motion and neck supple. Skin:     General: Skin is warm. Neurological:      General: No focal deficit present. Mental Status: She is alert. Psychiatric:         Mood and Affect: Mood normal.          Labs:    Recent Labs     11/14/21  0800 11/12/21  0625   WBC 18.0* 15.1*   HGB 13.3 13.2    332     Recent Labs     11/14/21  0800 11/12/21  0625   * 141   K 4.2 4.5    108   CO2 27 29   GLU 91 118*   BUN 22* 41*   CREA 0.58 0.67   CA 8.9 9.6     11/14 room air oxygen saturation 92% at rest  Imaging:    CXR Results  (Last 48 hours)    None        Results from Hospital Encounter encounter on 11/09/21    XR SWALLOW FUNC VIDEO    Narrative  Modified barium swallow, 11/10/2021    History: R 13.13    Procedure: This study was performed in conjunction with the speech pathologist.  Fluoroscopic evaluation was performed as the patient ingested various  consistencies of barium. An episode of penetration was seen with thin consistency. No verónica aspiration  occurred. The patient ingested nectar, honey and pudding consistencies without penetration  or aspiration. There was significant hypopharyngeal residue which the patient was not able to  fully clear with repeated swallowing. Fluoroscopy time: 1:28 minutes. Dose: 10 mGy. Number of images: 1. Impression  Penetration with thin consistency. No verónica aspiration. Significant hypopharyngeal residue. XR CHEST PORT    Narrative  Chest single view. Coarse reticulonodular markings through lungs. Less than 1 cm nodular density  right lung base. No gross interstitial or alveolar pulmonary edema. Cardiac  silhouette within normal limits. Elongated thoracic aorta with atherosclerotic  change. No pneumothorax or sizable pleural effusion.     CT chest findings 3/4/2021 noted. Consider CT follow-up to demonstrate stability. Results from [place] encounter on 11/09/21    CTA CHEST W OR W WO CONT    Narrative  CTA chest.    Axial images are reviewed along with reformatted sagittal/coronal/MIP images. 100 mL Isovue 370 administered. Dose reduction: All CT scans at this facility are performed using dose reduction  optimization techniques as appropriate to a performed exam including the  following-  automated exposure control, adjustments of mA and/or Kv according to patient  size, or use of iterative reconstructive technique. Apical pleural-parenchymal change. Emphysema. 3 mm lingular LINA nodule. Focal RML atelectasis; this likely accounts for recent plain film finding. Minor posteromedial basilar subsegmental atelectasis. Enhanced images reveal advanced atherosclerotic change thoracic aorta with  extension and great vessels. Normal caliber pulmonary arterial trunk. Normal  contrast opacification pulmonary arterial trunk and its branch vessels; no CT  evidence for PE. Central dependent bronchi are incompletely aerated suggesting mucous content. No  mediastinal lymphadenopathy. No pericardial effusion. Atherosclerosis continues into the imaged upper abdomen. Impression  Emphysema. RML focal atelectasis. Minor posteromedial basilar subsegmental atelectasis. 3 mm LINA nodule. No CT evidence for PE. Thoracoabdominal aorta atherosclerosis. Likely mucous central dependent bronchi. IMPRESSION:   1. Acute hypoxic respiratory failure currently at rest room air saturation 92% we will check overnight oximetry  2. Chronic Obstructive Pulmonary Disease no wheezing currently on Spiriva and Symbicort inhaler with oral prednisone  3. Left upper lobe 3 mm lung nodule  4. Bronchiectasis  5. Community-acquired pneumonia  6. COPD cachexia      RECOMMENDATIONS/PLAN:   1. Currently on room air we will check overnight oximetry  2.  Continue Symbicort and Spiriva inhalers we will decrease prednisone to 20 mg once a day  3. S/P PEG tube placement  4.   5. Patient on Zithromax   6.         Luli Abbott MD

## 2021-11-14 NOTE — PROGRESS NOTES
Comprehensive Nutrition Assessment    Type and Reason for Visit: Reassess (interim)    Nutrition Recommendations/Plan:   Continue TF of Jevity 1.5 adjusting to bolus feeds of 100mL q6hr      After pt tolerates x2 boluses, increase by 50mL each bolus to goal 240mL bolus q6hr (4x/day)  Flush with 120mL free water q4hr  Goal TF provides 1440kcal (103%), 61g protein (115%), 1450mL (96%)    Continue Full, Mildly Thickened Diet  Reduce Magic Cup to 1x day  Continue Ensure Enlive Daily    If pt to continue PO, may adjust/reduce TF boluses based on intake    Consider addition of appetite stimulant to help promote hunger cues    Nutrition Assessment:  Recent dx bronchitis. O2 requirements reducing- now on 2L NC. S/p PEG placement (11/12). Jevity 1.5 initiated yesterday at 25mL/hr, pt tolerating- RD to adjust to bolus feeds to promote continued oral intake. Remains ordered full liquids/mildly thick per SLP. Very poor PO intakes since admit. Previous assessment, pt endorsed hunger cues, reported very poor PO pta. Agreeable to magic cup and ensure (thickened)-vanilla flavor only. Labs: Glu , BUN 41, Ca 9.6. Meds: prednisone, pepcid, IVF, thiamin, MVI with folic acid, enoxaparin. Malnutrition Assessment:  Malnutrition Status:  Severe malnutrition    Context:  Chronic illness       Estimated Daily Nutrient Needs:  Energy (kcal): 1400kcal (40kcal/kg); Weight Used for Energy Requirements: Current  Protein (g): 53g (1,5g/kg); Weight Used for Protein Requirements: Current  Fluid (ml/day): 1500ml; Method Used for Fluid Requirements: Other (comment) (adult minimum)    Nutrition Related Findings:  NFPE showing severe muscle wasting throughout body. Denies C/S issues PTA, SLP following. No n/v.  Last BM 11/12, +BS. No edema.       Wounds:    None       Current Nutrition Therapies:  ADULT ORAL NUTRITION SUPPLEMENT Dinner, Lunch, Breakfast; Frozen Supplement  ADULT ORAL NUTRITION SUPPLEMENT Dinner; Standard High Calorie/High Protein  ADULT DIET Full Liquid; Mildly Thick (Nectar)  ADULT TUBE FEEDING PEG; Other Tube Feeding (Specify); Jevity 1.5; Delivery Method: Continuous; Continuous Initial Rate (mL/hr): 35; Continuous Advance Tube Feeding: No; Water Flush Volume (mL): 100; Water Flush Frequency: Q 4 hours    Anthropometric Measures:  · Height:  5' 4\" (162.6 cm)  · Current Body Wt:  35 kg (77 lb 2.6 oz)   · Usual Body Wt:  39.9 kg (88 lb) (3 months ago)     · Ideal Body Wt:  120 lbs:  64.3 %   · BMI Category:  Underweight (BMI less than 22) age over 72       Nutrition Diagnosis:   · Increased nutrient needs related to increased demand for energy/nutrients as evidenced by BMI, weight loss 7.5% in 3 months, severe muscle loss, severe loss of subcutaneous fat    Nutrition Interventions:   Food and/or Nutrient Delivery: Continue current diet, Modify oral nutrition supplement, Start tube feeding  Nutrition Education and Counseling: No recommendations at this time  Coordination of Nutrition Care: Continue to monitor while inpatient, Speech therapy    Goals:  Pt to meet >65% of EEN within 3 days. Wt gain 0.5kg per week. Genaro jain       Nutrition Monitoring and Evaluation:   Behavioral-Environmental Outcomes: None identified  Food/Nutrient Intake Outcomes: Enteral nutrition intake/tolerance, Diet advancement/tolerance, Food and nutrient intake  Physical Signs/Symptoms Outcomes: Chewing or swallowing, Weight, Meal time behavior    Discharge Planning:     Too soon to determine     Electronically signed by Ren Selby on 11/14/2021 at 8:48 AM    Contact:

## 2021-11-14 NOTE — PROGRESS NOTES
Problem: Mobility Impaired (Adult and Pediatric)  Goal: *Acute Goals and Plan of Care (Insert Text)  Description: Patient will move from supine to sit and sit to supine , scoot up and down, and roll side to side in bed with independence within 7 day(s). Patient will transfer from bed to chair and chair to bed with independence using the least restrictive device within 7 day(s). Patient will improve static standing balance to independence within 1 week(s). Patient will ambulate 40 feet with independence with least restrictive device within 1 weeks. Outcome: Progressing Towards Goal   PHYSICAL THERAPY TREATMENT  Patient: Kevin Landry (20 y.o. female)  Date: 11/14/2021  Diagnosis: Acute respiratory failure with hypoxia (HCC) [J96.01]  COPD with acute exacerbation (HCC) [J44.1]   <principal problem not specified>  Procedure(s) (LRB):  ESOPHAGOGASTRODUODENOSCOPY (EGD) (N/A)  PERCUTANEOUS ENDOSCOPIC GASTROSTOMY TUBE INSERTION (N/A) 2 Days Post-Op  Precautions:    Chart, physical therapy assessment, plan of care and goals were reviewed. ASSESSMENT  Patient continues with skilled PT services and is progressing towards goals. Patient agreeable to work with PT. Bed mobility performed mod I with incr time and use of bed rails. Patient able to don both shoes at EOB without assistance. Sit<>stand SBA with RW and patient performed 4 gait trials bed<>chair for ~20 feet each trial with SBA for safety and therapist assisted with line/lead management. Slow sylvia during gait and patient with forward flexed posture. Patient rested in chair for a few minutes 2/2 SOB. She also performed LE therex while seated between gait trials. Patient will benefit from continued skilled PT services to improve her strength for bed mobility and transfers and for gait training longer distances to return to PLOF and decr assistance from caregivers.      Current Level of Function Impacting Discharge (mobility/balance): decr mobility, AD for gait, decr standing balance, decr endurance    Other factors to consider for discharge: On supplemental O2, good family support, medical hx          PLAN :  Patient continues to benefit from skilled intervention to address the above impairments. Continue treatment per established plan of care. to address goals. Recommendation for discharge: (in order for the patient to meet his/her long term goals)  Home with [address] [facility] and 24/7 family care     This discharge recommendation:  Has been made in collaboration with the attending provider and/or case management    IF patient discharges home will need the following DME: rolling walker and to be determined (TBD)       SUBJECTIVE:   Patient stated I guess I can walk a little. My daughter is going to be here soon.     OBJECTIVE DATA SUMMARY:   Critical Behavior:  Neurologic State: Alert  Orientation Level: Oriented X4  Cognition: Appropriate decision making, Appropriate for age attention/concentration, Appropriate safety awareness, Follows commands  Safety/Judgement: Decreased awareness of need for safety, Decreased insight into deficits  Functional Mobility Training:  Bed Mobility:  Rolling: Modified independent  Supine to Sit: Modified independent  Sit to Supine: Modified independent  Scooting: Modified independent        Transfers:  Sit to Stand: Stand-by assistance  Stand to Sit: Stand-by assistance        Bed to Chair: Stand-by assistance    Balance:  Sitting: Intact  Standing: Impaired;  Without support  Standing - Static: Good; Constant support  Standing - Dynamic : Good; Constant support  Ambulation/Gait Training:  Distance (ft): 20 Feet (ft)  Assistive Device: Gait belt; Walker, rolling  Ambulation - Level of Assistance: Stand-by assistance    Base of Support: Narrowed  Slow pace during gait and forward flexed posture with support on RW     Therapeutic Exercises:   10x in sitting: LAQ, marching, ankle pumps, hip ER   Pain Rating:  \"I had some back pain a little while ago but it is gone now. \"    Activity Tolerance:   Fair, desaturates with exertion and requires oxygen, requires frequent rest breaks, and observed SOB with activity  Please refer to the flowsheet for vital signs taken during this treatment. After treatment patient left in no apparent distress:   Supine in bed, Call bell within reach, Bed / chair alarm activated, and Caregiver / family present    COMMUNICATION/COLLABORATION:   The patients plan of care was discussed with: Physical therapist and Registered nurse.      Corby Aguilera   Time Calculation: 26 mins

## 2021-11-15 NOTE — PROGRESS NOTES
OCCUPATIONAL THERAPY TREATMENT  Patient: Mariajose Lugo (14 y.o. female)  Date: 11/15/2021  Diagnosis: [facility] ([facility] [J96.01]  COPD with acute exacerbation ([facility] [J44.1]   <principal problem not specified>  Procedure(s) (LRB):  ESOPHAGOGASTRODUODENOSCOPY (EGD) (N/A)  PERCUTANEOUS ENDOSCOPIC GASTROSTOMY TUBE INSERTION (N/A) 3 Days Post-Op  Precautions:    Chart, occupational therapy assessment, plan of care, and goals were reviewed. ASSESSMENT  Patient continues with skilled OT services and is progressing towards goals. Pt. Received semi-supine in bed and agreeable to therapy session. Pt. Performed bed mobility with Mod I- increased assistance with line management, good static sitting balance noted at EOB, pt. Performed sit-> stand with SBA and functional ambulation from bed to chair with SBA. Pt. Able to complete LB dressing while seated in chair IND. Grooming while seated in chair with set-up assistance. Pt. Educated to perform UE therex while seated in chair to increased strength, pt verbalized understanding. Pt. Left seated in chair with needs met and call bell within reach. REC. HHOT with family care with medically appropriate for discharge. Other factors to consider for discharge: PLOF, time since on set, general weakness, assistance with transfers d/t line management         PLAN :  Patient continues to benefit from skilled intervention to address the above impairments. Continue treatment per established plan of care. to address goals.     Recommendation for discharge: (in order for the patient to meet his/her long term goals)  Occupational therapy at least 2 days/week in the home     This discharge recommendation:  Has been made in collaboration with the attending provider and/or case management    IF patient discharges home will need the following DME: TBD       SUBJECTIVE:   Patient im doing okay    OBJECTIVE DATA SUMMARY:   Cognitive/Behavioral Status:  Neurologic State: Alert  Orientation Level: Oriented X4  Cognition: Appropriate decision making; Follows commands    Functional Mobility and Transfers for ADLs:  Bed Mobility:  Rolling: Modified independent  Supine to Sit: Modified independent  Sit to Supine: Modified independent  Scooting: Modified independent    Transfers:  Sit to Stand: Stand-by assistance     Bed to Chair: Stand-by assistance    Balance:  Sitting: Intact  Standing: Impaired; With support  Standing - Static: Constant support; Good  Standing - Dynamic : Constant support; Good    ADL Intervention:       Grooming  Grooming Assistance: Independent; Set-up  Position Performed: Seated in chair  Brushing/Combing Hair: Independent; Set-up                   Lower Body Dressing Assistance  Socks: Independent  Position Performed: Seated in chair              Therapeutic Exercises:   Pt. Educated and verbalized understanding. Pain:  0/ 10 pain reported  Activity Tolerance:   Fair  Please refer to the flowsheet for vital signs taken during this treatment. After treatment patient left in no apparent distress:   Sitting in chair and Call bell within reach    COMMUNICATION/COLLABORATION:   The patients plan of care was discussed with: Registered nurse.      Elena Bowden  Time Calculation: 15 mins    Problem: Self Care Deficits Care Plan (Adult)  Goal: *Acute Goals and Plan of Care (Insert Text)  Description: Pt will be MI sup<->sit in prep for EOB ADL's  Pt will be MI  LB dressing sitting/standing level  Pt will be MI  sit<-> prep for toilet transfer  Pt will be MI  toilet transfer with LRAD  Pt will be MI  toileting/cloth mgmt LRAD  Pt will be MI  grooming standing sink  Pt will be MI bathing sitting/standing sink LRAD  Pt will be MI adelfo UE HEP in prep for self care tasks    Outcome: Progressing Towards Goal

## 2021-11-15 NOTE — PROGRESS NOTES
Hospitalist Progress Note    Subjective:   Daily Progress Note: 11/15/2021 7:44 AM    Hospital Course: Patient is a 77 y. o. female who has PMH significant for HTN, COPD, osteoporosis and chronic smoker.  Brought into the emergency room on 11/9/2021 for progressive shortness of breath with associated cough and weakness.  Diagnosed by PCP 1 week PTA with bronchitis and placed on prednisone.  Symptoms were not improving.  In the ED she is found to be hypoxic on room air and placed on 5 L nasal cannula for saturations of 91%.  She is then, frail, failure to thrive malnourished.  She lives with family otherwise independent of ADLs.  CT of chest revealing right middle lobe focal atelectasis, segmental basilar atelectasis, mucus likely central dependent bronchi, 3 mm left upper lobe nodule.  Negative for PE.  Significant labs on admission , calcium 10.3, WBC 11.8, D-dimer 1.03.  Covid negative. .  Admitted for further management respiratory failure with hypoxia, COPD exacerbation, pneumonia and bronchiectasis, failure to thrive, weight loss, dysphagia. Pulmonology consulted.  Started on on azithromycin, folic acid, thiamine, IV Solu-Medrol, Symbicort and Spiriva, Mucinex and Tessalon. Patient has also had 15 pound weight loss and inability to swallow effectively. GI consulted for EGD evaluation and PEG tube placement if required. Nutrition consulted for recommendations. 11/12/2021 EGD performed revealing large duodenal ulcer and gastric gastritis. PEG tube placed. Initiated continuous tube feeds awaiting nutrition recommendations. Subjective: Patient says that she is feeling better. Denies any abdominal pain, shortness of breath, chest pain.   She does admit to a nonproductive cough    Current Facility-Administered Medications   Medication Dose Route Frequency    azithromycin (ZITHROMAX) tablet 500 mg  500 mg Oral DAILY    metoprolol tartrate (LOPRESSOR) tablet 50 mg  50 mg Oral BID    predniSONE (DELTASONE) tablet 20 mg  20 mg Oral DAILY    amLODIPine (NORVASC) tablet 5 mg  5 mg Oral DAILY    aspirin chewable tablet 81 mg  81 mg Oral DAILY    ergocalciferol capsule 50,000 Units  50,000 Units Oral Q7D    pravastatin (PRAVACHOL) tablet 10 mg  10 mg Oral QPM    guaiFENesin (ROBITUSSIN) 100 mg/5 mL oral liquid 400 mg  400 mg Oral Q6H    famotidine (PF) (PEPCID) 20 mg in 0.9% sodium chloride 10 mL injection  20 mg IntraVENous DAILY    thiamine (B-1) injection 100 mg  100 mg IntraMUSCular DAILY    sodium chloride (NS) flush 5-40 mL  5-40 mL IntraVENous Q8H    sodium chloride (NS) flush 5-40 mL  5-40 mL IntraVENous PRN    acetaminophen (TYLENOL) tablet 650 mg  650 mg Oral Q6H PRN    Or    acetaminophen (TYLENOL) suppository 650 mg  650 mg Rectal Q6H PRN    polyethylene glycol (MIRALAX) packet 17 g  17 g Oral DAILY PRN    bisacodyL (DULCOLAX) tablet 5 mg  5 mg Oral DAILY PRN    ondansetron (ZOFRAN ODT) tablet 4 mg  4 mg Oral Q6H PRN    Or    ondansetron (ZOFRAN) injection 4 mg  4 mg IntraVENous Q6H PRN    nicotine (NICODERM CQ) 7 mg/24 hr patch 1 Patch  1 Patch TransDERmal DAILY    guaiFENesin ER (MUCINEX) tablet 1,200 mg  1,200 mg Oral Q12H    benzonatate (TESSALON) capsule 200 mg  200 mg Oral TID    multivitamin with folic acid (ONE DAILY WITH FOLIC ACID) tablet 1 Tablet  1 Tablet Oral DAILY    tiotropium bromide (SPIRIVA RESPIMAT) 2.5 mcg /actuation  2 Puff Inhalation DAILY    budesonide-formoteroL (SYMBICORT) 160-4.5 mcg/actuation HFA inhaler 2 Puff  2 Puff Inhalation BID RT        Review of Systems  Constitutional: No fevers, No chills, No sweats, ++ fatigue, ++ Weakness  Eyes: No redness  Ears, nose, mouth, throat, and face: No nasal congestion, No sore throat, No voice change  Respiratory: No Shortness of Breath, ++cough, No wheezing  Cardiovascular: No chest pain, No palpitations, No extremity edema  Gastrointestinal: No nausea, No vomiting, No diarrhea, No abdominal pain  Genitourinary: No frequency, No dysuria, No hematuria  Integument/breast: No skin lesion(s)   Neurological: No Confusion, No headaches, No dizziness      Objective:     Visit Vitals  BP (!) 164/78 (BP 1 Location: Right upper arm, BP Patient Position: At rest)   Pulse 84   Temp 97.5 °F (36.4 °C)   Resp 18   Ht 5' 4\" (1.626 m)   Wt 36.8 kg (81 lb 3.2 oz)   SpO2 92%   BMI 13.94 kg/m²    O2 Flow Rate (L/min): 2 l/min O2 Device: None (Room air)    Temp (24hrs), Av.7 °F (36.5 °C), Min:97.5 °F (36.4 °C), Max:97.9 °F (36.6 °C)      No intake/output data recorded.  1901 - 11/15 0700  In: 720   Out: 600 [Urine:600]    PHYSICAL EXAM:  Constitutional: frail, No acute distress  Skin: Extremities and face reveal no rashes. HEENT: Sclerae anicteric. Extra-occular muscles are intact. No oral ulcers. The neck is supple and no masses. Cardiovascular: Regular rate and rhythm. Respiratory:  Clear breath sounds bilaterally with no wheezes, rales, or rhonchi. GI: Abdomen nondistended, soft, and nontender. Normal active bowel sounds. Musculoskeletal: No pitting edema of the lower legs. Able to move all ext  Neurological:  Patient is alert and oriented. Cranial nerves II-XII grossly intact  Psychiatric: Mood appears appropriate       Data Review    Recent Results (from the past 24 hour(s))   CBC WITH AUTOMATED DIFF    Collection Time: 21  8:00 AM   Result Value Ref Range    WBC 18.0 (H) 3.6 - 11.0 K/uL    RBC 4.34 3.80 - 5.20 M/uL    HGB 13.3 11.5 - 16.0 g/dL    HCT 39.7 35.0 - 47.0 %    MCV 91.5 80.0 - 99.0 FL    MCH 30.6 26.0 - 34.0 PG    MCHC 33.5 30.0 - 36.5 g/dL    RDW 14.6 (H) 11.5 - 14.5 %    PLATELET 250 945 - 554 K/uL    MPV 11.2 8.9 - 12.9 FL    NRBC 0.0 0.0  WBC    ABSOLUTE NRBC 0.00 0.00 - 0.01 K/uL    NEUTROPHILS 77 (H) 32 - 75 %    LYMPHOCYTES 11 (L) 12 - 49 %    MONOCYTES 12 5 - 13 %    EOSINOPHILS 0 0 - 7 %    BASOPHILS 0 0 - 1 %    IMMATURE GRANULOCYTES 0 0 - 0.5 %    ABS.  NEUTROPHILS 13. 8 (H) 1.8 - 8.0 K/UL    ABS. LYMPHOCYTES 2.0 0.8 - 3.5 K/UL    ABS. MONOCYTES 2.1 (H) 0.0 - 1.0 K/UL    ABS. EOSINOPHILS 0.0 0.0 - 0.4 K/UL    ABS. BASOPHILS 0.0 0.0 - 0.1 K/UL    ABS. IMM. GRANS. 0.1 (H) 0.00 - 0.04 K/UL    DF AUTOMATED     METABOLIC PANEL, BASIC    Collection Time: 11/14/21  8:00 AM   Result Value Ref Range    Sodium 132 (L) 136 - 145 mmol/L    Potassium 4.2 3.5 - 5.1 mmol/L    Chloride 102 97 - 108 mmol/L    CO2 27 21 - 32 mmol/L    Anion gap 3 (L) 5 - 15 mmol/L    Glucose 91 65 - 100 mg/dL    BUN 22 (H) 6 - 20 mg/dL    Creatinine 0.58 0.55 - 1.02 mg/dL    BUN/Creatinine ratio 38 (H) 12 - 20      GFR est AA >60 >60 ml/min/1.73m2    GFR est non-AA >60 >60 ml/min/1.73m2    Calcium 8.9 8.5 - 10.1 mg/dL   CBC WITH AUTOMATED DIFF    Collection Time: 11/15/21  6:40 AM   Result Value Ref Range    WBC 17.9 (H) 3.6 - 11.0 K/uL    RBC 4.24 3.80 - 5.20 M/uL    HGB 12.7 11.5 - 16.0 g/dL    HCT 38.3 35.0 - 47.0 %    MCV 90.3 80.0 - 99.0 FL    MCH 30.0 26.0 - 34.0 PG    MCHC 33.2 30.0 - 36.5 g/dL    RDW 14.5 11.5 - 14.5 %    PLATELET 417 147 - 595 K/uL    MPV 10.7 8.9 - 12.9 FL    NRBC 0.0 0.0  WBC    ABSOLUTE NRBC 0.00 0.00 - 0.01 K/uL    NEUTROPHILS 79 (H) 32 - 75 %    LYMPHOCYTES 9 (L) 12 - 49 %    MONOCYTES 11 5 - 13 %    EOSINOPHILS 1 0 - 7 %    BASOPHILS 0 0 - 1 %    IMMATURE GRANULOCYTES 0 0 - 0.5 %    ABS. NEUTROPHILS 14.1 (H) 1.8 - 8.0 K/UL    ABS. LYMPHOCYTES 1.6 0.8 - 3.5 K/UL    ABS. MONOCYTES 1.9 (H) 0.0 - 1.0 K/UL    ABS. EOSINOPHILS 0.1 0.0 - 0.4 K/UL    ABS. BASOPHILS 0.0 0.0 - 0.1 K/UL    ABS. IMM.  GRANS. 0.1 (H) 0.00 - 0.04 K/UL    DF AUTOMATED     METABOLIC PANEL, BASIC    Collection Time: 11/15/21  6:40 AM   Result Value Ref Range    Sodium 130 (L) 136 - 145 mmol/L    Potassium 4.4 3.5 - 5.1 mmol/L    Chloride 98 97 - 108 mmol/L    CO2 26 21 - 32 mmol/L    Anion gap 6 5 - 15 mmol/L    Glucose 110 (H) 65 - 100 mg/dL    BUN 17 6 - 20 mg/dL    Creatinine 0.49 (L) 0.55 - 1.02 mg/dL BUN/Creatinine ratio 35 (H) 12 - 20      GFR est AA >60 >60 ml/min/1.73m2    GFR est non-AA >60 >60 ml/min/1.73m2    Calcium 8.6 8.5 - 10.1 mg/dL       Radiology review: CTA of chest    Assessment:   1. Acute hypoxic respiratory failure secondary to community-acquired pneumonia  2. COPD exacerbation  3. Hypertension  4. Failure to thrive/severe malnutrition/dysphagia status post PEG tube  5. Osteoporosis with chronic back pain  6. Tobacco abuse    Plan:    1.  CT of the chest showed negative for PE but did showed right middle lobe focal atelectasis, mucous, and 3 mm left upper lobe nodule. She is on azithromycin, Mucinex, Tessalon, Symbicort, Spiriva nebulizers. IV Solu-Medrol was transitioned to oral prednisone. Pulmonology consulted  2. Overnight pulse ox study performed and does not qualify for oxygen. I saturation within normal limits on room air at this time  3. Blood pressure is elevated. Continue with metoprolol and Norvasc. Will consider increasing if blood pressure still elevated  4. Patient is status post PEG tube placement on 11/12/2021. EGD reviewed large duodenal ulcer and gastritis with nonbleeding. On folic acid and thiamine. Continue with Pepcid. Tube feedings have been initiated. Wait for bowel function to return prior to discharge  5. Continue vitamin D3 and Boniva  6. Consult on tobacco cessation. Initiated nicotine replacement therapy  7. CBC and BMP in the AM    Dispo: 24-48 hours. Need patient to be a goal for tube feedings, bowel function return, and pulmonary clearance.  vs. SNF    CODE STATUS Full     DVT prophylaxis: Loveonx  Ulcer prophylaxis: Pepcid    Care Plan discussed with: Patient/Family, Nurse and     Total time spent with patient: 34 minutes.

## 2021-11-15 NOTE — PROGRESS NOTES
PULMONARY NOTE  [facility]    Name: Kevin Landry MRN: [id number]   : 1945 Hospital: [facility]   Date: 11/15/2021  Admission date: 2021 Hospital Day: 7       HPI:     Hospital Problems  Date Reviewed: 2021          Codes Class Noted POA    Dysphagia ICD-10-CM: R13.10  ICD-9-CM: 787.20  11/10/2021 Unknown        Failure to thrive (child) ICD-10-CM: R62.51  ICD-9-CM: 783.41  11/10/2021 Unknown        Severe protein-calorie malnutrition ([facility] [address] ICD-10-CM: E43  ICD-9-CM: 262  11/10/2021 Unknown        Unintentional weight loss ICD-10-CM: R63.4  ICD-9-CM: 783.21  11/10/2021 Unknown        COPD with acute exacerbation ([facility] [address] ICD-10-CM: J44.1  ICD-9-CM: 491.21  2021 Unknown        Acute respiratory failure with hypoxia (HCC) ICD-10-CM: J96.01  ICD-9-CM: 518.81  2021 Unknown        Hypertension ICD-10-CM: I10  ICD-9-CM: 401.9  2021 Unknown        Tobacco abuse ICD-10-CM: Z72.0  ICD-9-CM: 305.1  2021 Unknown        Osteoporosis ICD-10-CM: M81.0  ICD-9-CM: 733.00  2021 Unknown        Community acquired pneumonia ICD-10-CM: J18.9  ICD-9-CM: 362  2021 Unknown        Bronchiectasis ([facility] ICD-10-CM: J47.9  ICD-9-CM: 494.0  2021 Unknown                   [x] High complexity decision making was performed  [x] See my orders for details      Subjective/Initial History:     I was asked by Misael Braun MD to see Kevin Landry  a 77 y.o.    female in consultation     Excerpts from admission 2021 or consult notes as follows:   78-year-old lady came in because of shortness of breath and dyspnea she has significant past medical history of COPD hypertension and osteoporosis she is a chronic smoker quit smoking this morning before coming to the hospital. She was complaining about dyspnea and cough seen by primary care physician recommended prednisone did not seem to help saturation was in the 80s she was put on oxygen 5 with a nasal cannula she is not on any oxygen at home CAT scan of the chest was done which shows right middle lobe atelectasis and 3 mm left upper lobe lung nodule negative, embolism so admitted and pulmonary consult was called for further evaluation. No Known Allergies     MAR reviewed and pertinent medications noted or modified as needed     Current Facility-Administered Medications   Medication    0.9% sodium chloride infusion    enoxaparin (LOVENOX) injection 40 mg    azithromycin (ZITHROMAX) tablet 500 mg    metoprolol tartrate (LOPRESSOR) tablet 50 mg    predniSONE (DELTASONE) tablet 20 mg    amLODIPine (NORVASC) tablet 5 mg    aspirin chewable tablet 81 mg    ergocalciferol capsule 50,000 Units    pravastatin (PRAVACHOL) tablet 10 mg    guaiFENesin (ROBITUSSIN) 100 mg/5 mL oral liquid 400 mg    famotidine (PF) (PEPCID) 20 mg in 0.9% sodium chloride 10 mL injection    thiamine (B-1) injection 100 mg    sodium chloride (NS) flush 5-40 mL    sodium chloride (NS) flush 5-40 mL    acetaminophen (TYLENOL) tablet 650 mg    Or    acetaminophen (TYLENOL) suppository 650 mg    polyethylene glycol (MIRALAX) packet 17 g    bisacodyL (DULCOLAX) tablet 5 mg    ondansetron (ZOFRAN ODT) tablet 4 mg    Or    ondansetron (ZOFRAN) injection 4 mg    nicotine (NICODERM CQ) 7 mg/24 hr patch 1 Patch    guaiFENesin ER (MUCINEX) tablet 1,200 mg    benzonatate (TESSALON) capsule 200 mg    multivitamin with folic acid (ONE DAILY WITH FOLIC ACID) tablet 1 Tablet    tiotropium bromide (SPIRIVA RESPIMAT) 2.5 mcg /actuation    budesonide-formoteroL (SYMBICORT) 160-4.5 mcg/actuation HFA inhaler 2 Puff      Patient PCP: Catherine Foster MD  PMH:  has a past medical history of COPD (chronic obstructive pulmonary disease) ([address] Essential hypertension, Family history of skin cancer, Osteoporosis, and Tobacco abuse. PSH:   has a past surgical history that includes hx mohs procedure (02/28/2018).    FHX: family history includes Hypertension in her mother. SHX:  reports that she has been smoking. She started smoking about 60 years ago. She has a 60.00 pack-year smoking history. She has never used smokeless tobacco.     ROS:    Review of Systems   Constitutional: Positive for malaise/fatigue and weight loss. HENT: Negative. Eyes: Negative. Respiratory: Positive for cough, sputum production, shortness of breath and wheezing. Cardiovascular: Positive for chest pain and orthopnea. Gastrointestinal: Negative. Genitourinary: Negative. Musculoskeletal: Negative. Skin: Negative. Neurological: Negative. Psychiatric/Behavioral: Negative. Objective:     Vital Signs: Telemetry:    normal sinus rhythm Intake/Output:   Visit Vitals  BP (!) 145/74 (BP 1 Location: Left upper arm, BP Patient Position: Lying)   Pulse 90   Temp 97.8 °F (36.6 °C)   Resp 20   Ht 5' 4\" (1.626 m)   Wt 36.8 kg (81 lb 3.2 oz)   SpO2 93%   BMI 13.94 kg/m²       Temp (24hrs), Av.7 °F (36.5 °C), Min:97.5 °F (36.4 °C), Max:97.9 °F (36.6 °C)        O2 Device: None (Room air) O2 Flow Rate (L/min): 2 l/min       Wt Readings from Last 4 Encounters:   21 36.8 kg (81 lb 3.2 oz)   21 45.4 kg (100 lb)          Intake/Output Summary (Last 24 hours) at 11/15/2021 0958  Last data filed at 2021 1606  Gross per 24 hour   Intake    Output 600 ml   Net -600 ml       Last shift:      No intake/output data recorded. Last 3 shifts:  1901 - 11/15 0700  In: 720   Out: 600 [Urine:600]       Physical Exam:     Physical Exam  Constitutional:       Appearance: Normal appearance. She is ill-appearing and diaphoretic. Comments: Thin frail appearing   HENT:      Head: Normocephalic and atraumatic. Nose: Nose normal.      Mouth/Throat:      Mouth: Mucous membranes are moist.   Eyes:      Pupils: Pupils are equal, round, and reactive to light. Cardiovascular:      Rate and Rhythm: Normal rate and regular rhythm.       Pulses: Normal pulses. Pulmonary:      Comments: Poor breath sounds with prolongation of exhalation no wheezes no rales  Abdominal:      General: Abdomen is flat. Bowel sounds are normal.      Palpations: Abdomen is soft. Musculoskeletal:         General: Normal range of motion. Cervical back: Normal range of motion and neck supple. Skin:     General: Skin is warm. Neurological:      General: No focal deficit present. Mental Status: She is alert. Psychiatric:         Mood and Affect: Mood normal.          Labs:    Recent Labs     11/15/21  0640 11/14/21  0800   WBC 17.9* 18.0*   HGB 12.7 13.3    285     Recent Labs     11/15/21  0640 11/14/21  0800   * 132*   K 4.4 4.2   CL 98 102   CO2 26 27   * 91   BUN 17 22*   CREA 0.49* 0.58   CA 8.6 8.9     11/14 room air oxygen saturation 92% at rest  Imaging:    CXR Results  (Last 48 hours)    None        Results from Hospital Encounter encounter on 11/09/21    XR SWALLOW FUNC VIDEO    Narrative  Modified barium swallow, 11/10/2021    History: R 13.13    Procedure: This study was performed in conjunction with the speech pathologist.  Fluoroscopic evaluation was performed as the patient ingested various  consistencies of barium. An episode of penetration was seen with thin consistency. No verónica aspiration  occurred. The patient ingested nectar, honey and pudding consistencies without penetration  or aspiration. There was significant hypopharyngeal residue which the patient was not able to  fully clear with repeated swallowing. Fluoroscopy time: 1:28 minutes. Dose: 10 mGy. Number of images: 1. Impression  Penetration with thin consistency. No verónica aspiration. Significant hypopharyngeal residue. XR CHEST PORT    Narrative  Chest single view. Coarse reticulonodular markings through lungs. Less than 1 cm nodular density  right lung base. No gross interstitial or alveolar pulmonary edema.  Cardiac  silhouette within normal limits. Elongated thoracic aorta with atherosclerotic  change. No pneumothorax or sizable pleural effusion. CT chest findings 3/4/2021 noted. Consider CT follow-up to demonstrate stability. Results from [facility] encounter on 11/09/21    CTA CHEST W OR W WO CONT    Narrative  CTA chest.    Axial images are reviewed along with reformatted sagittal/coronal/MIP images. 100 mL Isovue 370 administered. Dose reduction: All CT scans at this facility are performed using dose reduction  optimization techniques as appropriate to a performed exam including the  following-  automated exposure control, adjustments of mA and/or Kv according to patient  size, or use of iterative reconstructive technique. Apical pleural-parenchymal change. Emphysema. 3 mm lingular LINA nodule. Focal RML atelectasis; this likely accounts for recent plain film finding. Minor posteromedial basilar subsegmental atelectasis. Enhanced images reveal advanced atherosclerotic change thoracic aorta with  extension and great vessels. Normal caliber pulmonary arterial trunk. Normal  contrast opacification pulmonary arterial trunk and its branch vessels; no CT  evidence for PE. Central dependent bronchi are incompletely aerated suggesting mucous content. No  mediastinal lymphadenopathy. No pericardial effusion. Atherosclerosis continues into the imaged upper abdomen. Impression  Emphysema. RML focal atelectasis. Minor posteromedial basilar subsegmental atelectasis. 3 mm LINA nodule. No CT evidence for PE. Thoracoabdominal aorta atherosclerosis. Likely mucous central dependent bronchi. IMPRESSION:   1. Acute hypoxic respiratory failure   2. Chronic Obstructive Pulmonary Disease no wheezing currently on Spiriva and Symbicort inhaler with oral prednisone  3. Left upper lobe 3 mm lung nodule  4. Bronchiectasis  5. Community-acquired pneumonia  6. COPD cachexia  7.  Her ribs are hurting will repeat chest x-ray today RECOMMENDATIONS/PLAN:   1. Currently on room air overnight pulse oximetry shows no desaturation  2. Continue Symbicort and Spiriva inhalers  prednisone to 20 mg once a day  3. S/P PEG tube placement tolerating PEG tube feeding  4.  Patient on Zithromax        Pedro Luis Quintero MD

## 2021-11-15 NOTE — PROGRESS NOTES
Referral has been sent via [facility] for [place] [id number] for teaching and supplies for home tube feed setup. Patient is currently awaiting return of bowel function and tolerate tube feeding. DC plan is home [facility] and tube feed with [employer].

## 2021-11-16 NOTE — PROGRESS NOTES
Patient being discharged home today with [facility] [place] [facility]. Will also be setup with [employer] for tube feeds. And awaiting teaching from [employer]. Also will have bedside commode  And rolling walker setup with [facility]. Discharge plan of care/case management plan validated with provider discharge order. Teaching has been provided and family has picked up equipment. Tube feed to be delivered this afternoon at their home. Family patient in agreement with discharge plan. Discharge plan of care/case management plan validated with provider discharge order. Medicare pt has received, reviewed, and signed 2nd IM letter informing them of their right to appeal the discharge.   Signed copied has been placed on pt bedside chart.'

## 2021-11-16 NOTE — PROGRESS NOTES
Spiritual Care Assessment/Progress Note  [facility]      NAME: Lauren Murray      MRN: [id number]  AGE: 77 y.o. SEX: female  Shinto Affiliation: Taoism   Language: English     11/16/2021     Total Time (in minutes): 10     Spiritual Assessment begun in [facility] through conversation with:         [x]Patient        [] Family    [] Friend(s)        Reason for Consult: Initial visit, Other (comment) (Silent support)     Spiritual beliefs: (Please include comment if needed)     [] Identifies with a deacon tradition:         [] Supported by a deacon community:            [] Claims no spiritual orientation:           [] Seeking spiritual identity:                [] Adheres to an individual form of spirituality:           [x] Not able to assess:                           Identified resources for coping:      [] Prayer                               [] Music                  [] Guided Imagery     [] Family/friends                 [] Pet visits     [] Devotional reading                         [x] Unknown     [] Other:                                               Interventions offered during this visit: (See comments for more details)    Patient Interventions: Initial visit           Plan of Care:     [] Support spiritual and/or cultural needs    [] Support AMD and/or advance care planning process      [] Support grieving process   [] Coordinate Rites and/or Rituals    [] Coordination with community clergy   [] No spiritual needs identified at this time   [] Detailed Plan of Care below (See Comments)  [] Make referral to Music Therapy  [] Make referral to Pet Therapy     [] Make referral to Addiction services  [] Make referral to [facility]  [] Make referral to Spiritual Care Partner  [] No future visits requested        [x] Contact Spiritual Care for further referrals     Visit attempted for patient in the [address] [place] [address] surgical unit for initial assessment.   Staff was providing care for the patient during the visit. There were no visitors in the room. Provided silent support. Chaplains will follow up if further referrals are requested. Chaplain Arabella Du M.Div.    can be reached by calling the  at [facility]  [phone]

## 2021-11-16 NOTE — DISCHARGE INSTRUCTIONS
Hospitalist Discharge Instructions     Patient ID:    Analilia Frederick  [id number]  77 y.o.  1945    Admit date: 11/9/2021    Discharge date : 11/16/2021    Chronic Diagnoses:    Problem List as of 11/16/2021 Date Reviewed: 11/12/2021          Codes Class Noted - Resolved    Dysphagia ICD-10-CM: R13.10  ICD-9-CM: 787.20  11/10/2021 - Present        Failure to thrive (child) ICD-10-CM: R62.51  ICD-9-CM: 783.41  11/10/2021 - Present        Severe protein-calorie malnutrition ([facility] ICD-10-CM: E43  ICD-9-CM: 262  11/10/2021 - Present        Unintentional weight loss ICD-10-CM: R63.4  ICD-9-CM: 783.21  11/10/2021 - Present        COPD with acute exacerbation ([facility] ICD-10-CM: J44.1  ICD-9-CM: 491.21  11/9/2021 - Present        Acute respiratory failure with hypoxia ([facility] ICD-10-CM: J96.01  ICD-9-CM: 518.81  11/9/2021 - Present        Hypertension ICD-10-CM: I10  ICD-9-CM: 401.9  11/9/2021 - Present        Tobacco abuse ICD-10-CM: Z72.0  ICD-9-CM: 305.1  11/9/2021 - Present        Osteoporosis ICD-10-CM: M81.0  ICD-9-CM: 733.00  11/9/2021 - Present        Community acquired pneumonia ICD-10-CM: J18.9  ICD-9-CM: 557  11/9/2021 - Present        Bronchiectasis ([facility] ICD-10-CM: J47.9  ICD-9-CM: 494.0  11/9/2021 - Present              Final Diagnoses:   1. Acute hypoxic respiratory failure secondary to community-acquired pneumonia  2. COPD exacerbation  3. Hypertension  4. Failure to thrive/severe malnutrition/dysphagia status post PEG tube  5. Osteoporosis with chronic back pain  6. Tobacco abuse     Reason for Hospitalization: Weakness and shortness of breath and cough    Discharge Medications:   Current Discharge Medication List      START taking these medications    Details   benzonatate (TESSALON) 200 mg capsule Take 1 Capsule by mouth three (3) times daily for 7 days.   Qty: 21 Capsule, Refills: 0  Start date: 11/16/2021, End date: 11/23/2021      budesonide-formoteroL (SYMBICORT) 160-4.5 mcg/actuation HFAA Take 2 Puffs by inhalation two (2) times a day. Qty: 10.2 g, Refills: 0  Start date: 11/16/2021      guaiFENesin (ROBITUSSIN) 100 mg/5 mL liquid Take 20 mL by mouth every six (6) hours for 5 days. Qty: 400 mL, Refills: 0  Start date: 11/16/2021, End date: 11/21/2021      multivitamin with folic acid (ONE DAILY WITH FOLIC ACID) 994 mcg tab tablet Take 1 Tablet by mouth daily for 30 days. Qty: 30 Tablet, Refills: 0  Start date: 11/16/2021, End date: 12/16/2021      predniSONE (DELTASONE) 20 mg tablet Take 20 mg by mouth daily for 14 days. Qty: 14 Tablet, Refills: 0  Start date: 11/16/2021, End date: 11/30/2021      albuterol (PROVENTIL HFA, VENTOLIN HFA, PROAIR HFA) 90 mcg/actuation inhaler Take 2 Puffs by inhalation every six (6) hours as needed for Wheezing or Shortness of Breath for up to 30 days. Qty: 1 Each, Refills: 1  Start date: 11/16/2021, End date: 12/16/2021      famotidine (Pepcid) 40 mg tablet Take 1 Tablet by mouth daily for 30 days. Qty: 30 Tablet, Refills: 0  Start date: 11/16/2021, End date: 12/16/2021      azithromycin (ZITHROMAX) 500 mg tab Take 1 Tablet by mouth daily for 4 days. Qty: 4 Tablet, Refills: 0  Start date: 11/16/2021, End date: 11/20/2021         CONTINUE these medications which have NOT CHANGED    Details   ibandronate (BONIVA) 150 mg tablet       metoprolol tartrate (LOPRESSOR) 50 mg tablet Take  by mouth two (2) times a day. amLODIPine (NORVASC) 5 mg tablet Take 5 mg by mouth daily. lovastatin (MEVACOR) 10 mg tablet Take  by mouth nightly.      ergocalciferol (VITAMIN D2) 50,000 unit capsule Take 50,000 Units by mouth. ASPIRIN PO Take  by mouth. STOP taking these medications       hydroCHLOROthiazide (HYDRODIURIL) 12.5 mg tablet Comments:   Reason for Stopping: Follow up Care:    1. Vianney Wesley MD in 1-2 weeks.       Follow-up Information     Follow up With Specialties Details Why Contact Nina Pearson Jana Arteaga MD Geriatric Medicine In 2 weeks  [address]      Dulce Maria Quiroz MD Pulmonary Disease In 2 weeks  [address] [facility]  [phone]              Patient Follow Up Instructions: Activity: Activity as tolerated  Diet: Standard with fiber tube feedings bolus four times daily at 100 mL, water flush 520 mL every 4 hours.   Patient may also tolerate mildly thick nectar diet for pleasure feeding only  Wound care: None Needed    Condition at Discharge:  Stable  __________________________________________________________________    Disposition  Home with family and home health services  ____________________________________________________________________    Code Status: Full Code  ___________________________________________________________________    CONSULTATIONS: Pulmonary/Intensive care and GI    Signed:  Renzo Escobedo PA-C  11/16/2021  7:24 AM

## 2021-11-16 NOTE — DISCHARGE SUMMARY
Hospitalist Discharge Summary     Patient ID:    Brant Limon  [id number]  77 y.o.  1945    Admit date: 11/9/2021    Discharge date : 11/16/2021    Chronic Diagnoses:    Problem List as of 11/16/2021 Date Reviewed: 11/12/2021          Codes Class Noted - Resolved    Dysphagia ICD-10-CM: R13.10  ICD-9-CM: 787.20  11/10/2021 - Present        Failure to thrive (child) ICD-10-CM: R62.51  ICD-9-CM: 783.41  11/10/2021 - Present        Severe protein-calorie malnutrition ([facility] ICD-10-CM: E43  ICD-9-CM: 262  11/10/2021 - Present        Unintentional weight loss ICD-10-CM: R63.4  ICD-9-CM: 783.21  11/10/2021 - Present        COPD with acute exacerbation ([facility] ICD-10-CM: J44.1  ICD-9-CM: 491.21  11/9/2021 - Present        Acute respiratory failure with hypoxia ([facility] ICD-10-CM: J96.01  ICD-9-CM: 518.81  11/9/2021 - Present        Hypertension ICD-10-CM: I10  ICD-9-CM: 401.9  11/9/2021 - Present        Tobacco abuse ICD-10-CM: Z72.0  ICD-9-CM: 305.1  11/9/2021 - Present        Osteoporosis ICD-10-CM: M81.0  ICD-9-CM: 733.00  11/9/2021 - Present        Community acquired pneumonia ICD-10-CM: J18.9  ICD-9-CM: 385  11/9/2021 - Present        Bronchiectasis ([facility] ICD-10-CM: J47.9  ICD-9-CM: 494.0  11/9/2021 - Present              Final Diagnoses:   1. Acute hypoxic respiratory failure secondary to community-acquired pneumonia  2. COPD exacerbation  3. Hypertension  4. Failure to thrive/severe malnutrition/dysphagia status post PEG tube  5. Osteoporosis with chronic back pain  6. Tobacco abuse     Reason for Hospitalization: Weakness and shortness of breath and cough      Hospital Course: Patient is a 77 y. o. female who has PMH significant for HTN, COPD, osteoporosis and chronic smoker.  Brought into the emergency room on 11/9/2021 for progressive shortness of breath with associated cough and weakness.  Diagnosed by PCP 1 week PTA with bronchitis and placed on prednisone.  Symptoms were not improving.  In the ED she is found to be hypoxic on room air and placed on 5 L nasal cannula for saturations of 91%.  She is then, frail, failure to thrive malnourished.  She lives with family otherwise independent of ADLs.  CT of chest revealing right middle lobe focal atelectasis, segmental basilar atelectasis, mucus likely central dependent bronchi, 3 mm left upper lobe nodule.  Negative for PE.  Significant labs on admission , calcium 10.3, WBC 11.8, D-dimer 1.03.  Covid negative. .  Admitted for further management respiratory failure with hypoxia, COPD exacerbation, pneumonia and bronchiectasis, failure to thrive, weight loss, dysphagia. Pulmonology consulted.  Started on on azithromycin, folic acid, thiamine, IV Solu-Medrol, Symbicort and Spiriva, Mucinex and Ane Cartmaximilian has also had 15 pound weight loss and inability to swallow effectively.  GI consulted for EGD evaluation and PEG tube placement if required.  Nutrition consulted for recommendations. 11/12/2021 EGD performed revealing large duodenal ulcer and gastric gastritis.  PEG tube placed.  Initiated continuous tube feeds and tolerating well. Patient was weaned off of oxygen and does not qualify for oxygen at home. [place] stable for discharge home with home health for PT, OT, skilled nursing for tube feedings. She will be discharged with four more days of oral azithromycin to complete a 2-week course of antibiotics. Patient will be confined to room without access to bathroom/facilities. Will need 3 in 1 commode. Discharge Medications:   Current Discharge Medication List      START taking these medications    Details   benzonatate (TESSALON) 200 mg capsule Take 1 Capsule by mouth three (3) times daily for 7 days. Qty: 21 Capsule, Refills: 0  Start date: 11/16/2021, End date: 11/23/2021      budesonide-formoteroL (SYMBICORT) 160-4.5 mcg/actuation HFAA Take 2 Puffs by inhalation two (2) times a day.   Qty: 10.2 g, Refills: 0  Start date: 11/16/2021      guaiFENesin (ROBITUSSIN) 100 mg/5 mL liquid Take 20 mL by mouth every six (6) hours for 5 days. Qty: 400 mL, Refills: 0  Start date: 11/16/2021, End date: 11/21/2021      multivitamin with folic acid (ONE DAILY WITH FOLIC ACID) 791 mcg tab tablet Take 1 Tablet by mouth daily for 30 days. Qty: 30 Tablet, Refills: 0  Start date: 11/16/2021, End date: 12/16/2021      predniSONE (DELTASONE) 20 mg tablet Take 20 mg by mouth daily for 14 days. Qty: 14 Tablet, Refills: 0  Start date: 11/16/2021, End date: 11/30/2021      albuterol (PROVENTIL HFA, VENTOLIN HFA, PROAIR HFA) 90 mcg/actuation inhaler Take 2 Puffs by inhalation every six (6) hours as needed for Wheezing or Shortness of Breath for up to 30 days. Qty: 1 Each, Refills: 1  Start date: 11/16/2021, End date: 12/16/2021      famotidine (Pepcid) 40 mg tablet Take 1 Tablet by mouth daily for 30 days. Qty: 30 Tablet, Refills: 0  Start date: 11/16/2021, End date: 12/16/2021      azithromycin (ZITHROMAX) 500 mg tab Take 1 Tablet by mouth daily for 4 days. Qty: 4 Tablet, Refills: 0  Start date: 11/16/2021, End date: 11/20/2021         CONTINUE these medications which have NOT CHANGED    Details   ibandronate (BONIVA) 150 mg tablet       metoprolol tartrate (LOPRESSOR) 50 mg tablet Take  by mouth two (2) times a day. amLODIPine (NORVASC) 5 mg tablet Take 5 mg by mouth daily. lovastatin (MEVACOR) 10 mg tablet Take  by mouth nightly.      ergocalciferol (VITAMIN D2) 50,000 unit capsule Take 50,000 Units by mouth. ASPIRIN PO Take  by mouth. STOP taking these medications       hydroCHLOROthiazide (HYDRODIURIL) 12.5 mg tablet Comments:   Reason for Stopping: Follow up Care:    1. Vianney Wesley MD in 1-2 weeks.       Follow-up Information     Follow up With Specialties Details Why Contact Info    Vianney Wesley MD Geriatric Medicine In 2 weeks  [address] [facility] [address] [phone] Monica Morales MD Pulmonary Disease In 2 weeks  [address]   [place]  [phone]              Patient Follow Up Instructions: Activity: Activity as tolerated  Diet: Standard with fiber tube feedings bolus four times daily at 100 mL, water flush 520 mL every 4 hours. Patient may also tolerate mildly thick nectar diet for pleasure feeding only  Wound care: None Needed    Condition at Discharge:  Stable  __________________________________________________________________    Disposition  Home with family and home health services  ____________________________________________________________________    Code Status: Full Code  ___________________________________________________________________    Discharge Exam:  Patient seen and examined by me on discharge day. Pertinent Findings:  Gen:    Not in distress  Chest: Clear lungs  CVS:   Regular rhythm. No edema  Abd:  Soft, not distended, not tender  Neuro:  Alert    CONSULTATIONS: Pulmonary/Intensive care and GI    Significant Diagnostic Studies:   11/9/2021: BUN 48 mg/dL (H; Ref range: 6 - 20 mg/dL); Calcium 10.3 mg/dL (H; Ref range: 8.5 - 10.1 mg/dL); CO2 28 mmol/L (Ref range: 21 - 32 mmol/L); Creatinine 0.90 mg/dL (Ref range: 0.55 - 1.02 mg/dL); Glucose 134 mg/dL (H; Ref range: 65 - 100 mg/dL); HCT 43.0 % (Ref range: 35.0 - 47.0 %); HGB 14.4 g/dL (Ref range: 11.5 - 16.0 g/dL); Potassium 3.6 mmol/L (Ref range: 3.5 - 5.1 mmol/L); Sodium 140 mmol/L (Ref range: 136 - 145 mmol/L)  11/10/2021: BUN 39 mg/dL (H; Ref range: 6 - 20 mg/dL); Calcium 10.5 mg/dL (H; Ref range: 8.5 - 10.1 mg/dL); CO2 24 mmol/L (Ref range: 21 - 32 mmol/L); Creatinine 0.77 mg/dL (Ref range: 0.55 - 1.02 mg/dL); Glucose 142 mg/dL (H; Ref range: 65 - 100 mg/dL); HCT 43.5 % (Ref range: 35.0 - 47.0 %); HGB 14.5 g/dL (Ref range: 11.5 - 16.0 g/dL); Potassium 3.7 mmol/L (Ref range: 3.5 - 5.1 mmol/L);  Sodium 140 mmol/L (Ref range: 136 - 145 mmol/L)  Recent Labs 11/15/21  0640 11/14/21  0800   WBC 17.9* 18.0*   HGB 12.7 13.3   HCT 38.3 39.7    285     Recent Labs     11/15/21  0640 11/14/21  0800   * 132*   K 4.4 4.2   CL 98 102   CO2 26 27   BUN 17 22*   CREA 0.49* 0.58   * 91   CA 8.6 8.9     No results for input(s): ALT, AP, TBIL, TBILI, TP, ALB, GLOB, GGT, AML, LPSE in the last 72 hours. No lab exists for component: SGOT, GPT, AMYP, HLPSE  No results for input(s): INR, PTP, APTT, INREXT in the last 72 hours. No results for input(s): FE, TIBC, PSAT, FERR in the last 72 hours. No results for input(s): PH, PCO2, PO2 in the last 72 hours. No results for input(s): CPK, CKMB in the last 72 hours.     No lab exists for component: TROPONINI  Lab Results   Component Value Date/Time    Glucose (POC) 173 (H) 11/10/2021 07:41 AM         Total Time: >30 min     Signed:  Jodie Geiger PA-C  11/16/2021  7:24 AM

## 2021-11-16 NOTE — PROGRESS NOTES
SPEECH LANGUAGE PATHOLOGY DYSPHAGIA TREATMENT  Patient: Juan A Schroeder (72 y.o. female)  Date: 11/16/2021  Diagnosis: Acute respiratory failure with hypoxia ([facility] [J96.01]  [facility]D with [facility] exacerbation [facility] [J44.1]   <principal problem not specified>  Procedure(s) (LRB):  ESOPHAGOGASTRODUODENOSCOPY (EGD) (N/A)  PERCUTANEOUS ENDOSCOPIC GASTROSTOMY TUBE INSERTION (N/A) 4 Days Post-Op  Precautions:      ASSESSMENT :  Patient is s/p PEG placement and tolerating TF 35mL/hr. Patient reports she is tolerating ensures and mildly thick liquids. Provided starter kit and education on thickener process once home. Introduced swallow strengthening exercises w/ handout for work at home via [facility] 2/10, effortful swallow 10/20, lingual resistance (3 sec hold)5/15, modified shaker (10 sec hold w/ goal of 60 sec hold 2/10, and epiglottic control 5/10. Patient benefited from min cues she was noted to easily fatigue. Patient will benefit from skilled intervention to address the above impairments. Patients rehabilitation potential is considered to be Fair     PLAN :  Recommendations and Planned Interventions:  Rec full mildly thick liquids with strict asp/GERD precautions. Rec cont swallow strengthening exercises. Frequency/Duration: Patient will be followed by speech-language pathology 3-5 times a week to address goals. Discharge Recommendations: HH      SUBJECTIVE:   Patient reports tolerating TF. OBJECTIVE:     Past Medical History:   Diagnosis Date    COPD (chronic obstructive pulmonary disease) ([facility]     Essential hypertension     Family history of skin cancer     Osteoporosis     Tobacco abuse        CXR Results  (Last 48 hours)                 11/16/21 1011  XR CHEST PORT Final result    Impression:  The cardiomediastinal silhouette is appropriate for age, technique,   and lung expansion. Pulmonary vasculature is not congested. The lungs are   essentially clear. No effusion or pneumothorax is seen.    Free air versus colonic interposition again evident       Narrative:  1 view comparison yesterday           11/15/21 1205  XR CHEST PORT Final result    Impression:  Pulmonary emphysema changes without acute infiltrate or atelectasis. Question air beneath the right hemidiaphragm. Recommend CT abdomen and pelvis if   needed for additional assessment. Narrative:  Portable chest:       History:Rib pain       COMPARISON: Portable chest 11/9/2021       Lungs show hyperaeration. There are increased parenchymal/interstitial markings   compatible with fibrosis. No acute infiltrate or pleural fluid. Heart is normal   size. Calcified plaque involving the aortic arch with aortic uncoiling. Question   air beneath the right hemidiaphragm. Current Diet:  DIET ADULT  DIET ADULT ORAL NUTRITION SUPPLEMENT  DIET ADULT ORAL NUTRITION SUPPLEMENT  DIET ADULT TUBE FEEDING     Cognitive and Communication Status:  Neurologic State: Alert  Orientation Level: Oriented X4  Cognition: Appropriate decision making, Follows commands  Perception: Appears intact  Perseveration: No perseveration noted  Safety/Judgement: Decreased awareness of need for safety, Decreased insight into deficits        Pain:  Pain Scale 1: Numeric (0 - 10)  Pain Intensity 1: 5  Pain Location 1: Back    After treatment:   Patient left in no apparent distress in bed, Call bell within reach, Nursing notified, and Caregiver / family present    COMMUNICATION/EDUCATION:   Patient and daughter educated regarding swallow strengthening exercises, diet recs, s/s aspiration and aspiration precautions. They demonstrated Good understanding as evidenced by engagement and appropriate questions, Handouts and starter thickener kit provided. The patient's plan of care including recommendations, planned interventions, and recommended diet changes were discussed with: Registered nurse and PA .        Thank you for this referral.  Radha Banda M.S., M.Ed., CCC-SLP  Time Calculation: 28 mins

## 2021-11-16 NOTE — PROGRESS NOTES
PULMONARY NOTE  [facility]    Name: Pamella Rojas MRN: [id number]   : 1945 Hospital: [address] [place] [address]   Date: 2021  Admission date: 2021 Hospital Day: 8       HPI:     Hospital Problems  Date Reviewed: 2021          Codes Class Noted POA    Dysphagia ICD-10-CM: R13.10  ICD-9-CM: 787.20  11/10/2021 Unknown        Failure to thrive (child) ICD-10-CM: R62.51  ICD-9-CM: 783.41  11/10/2021 Unknown        Severe protein-calorie malnutrition ([facility] ICD-10-CM: E43  ICD-9-CM: 262  11/10/2021 Unknown        Unintentional weight loss ICD-10-CM: R63.4  ICD-9-CM: 783.21  11/10/2021 Unknown        COPD with acute exacerbation ([email or web address] ICD-10-CM: J44.1  ICD-9-CM: 491.21  2021 Unknown        Acute respiratory failure with hypoxia (HCC) ICD-10-CM: J96.01  ICD-9-CM: 518.81  2021 Unknown        Hypertension ICD-10-CM: I10  ICD-9-CM: 401.9  2021 Unknown        Tobacco abuse ICD-10-CM: Z72.0  ICD-9-CM: 305.1  2021 Unknown        Osteoporosis ICD-10-CM: M81.0  ICD-9-CM: 733.00  2021 Unknown        Community acquired pneumonia ICD-10-CM: J18.9  ICD-9-CM: 011  2021 Unknown        Bronchiectasis ([facility] ICD-10-CM: J47.9  ICD-9-CM: 494.0  2021 Unknown                   [x] High complexity decision making was performed  [x] See my orders for details      Subjective/Initial History:     I was asked by Sheridan Nelson MD to see Pamella Rojas  a 77 y.o.    female in consultation     Excerpts from admission 2021 or consult notes as follows:   64-year-old lady came in because of shortness of breath and dyspnea she has significant past medical history of COPD hypertension and osteoporosis she is a chronic smoker quit smoking this morning before coming to the hospital. She was complaining about dyspnea and cough seen by primary care physician recommended prednisone did not seem to help saturation was in the 80s she was put on oxygen 5 with a nasal cannula she is not on any oxygen at home CAT scan of the chest was done which shows right middle lobe atelectasis and 3 mm left upper lobe lung nodule negative, embolism so admitted and pulmonary consult was called for further evaluation. No Known Allergies     MAR reviewed and pertinent medications noted or modified as needed     Current Facility-Administered Medications   Medication    enoxaparin (LOVENOX) injection 40 mg    azithromycin (ZITHROMAX) tablet 500 mg    metoprolol tartrate (LOPRESSOR) tablet 50 mg    predniSONE (DELTASONE) tablet 20 mg    amLODIPine (NORVASC) tablet 5 mg    aspirin chewable tablet 81 mg    ergocalciferol capsule 50,000 Units    pravastatin (PRAVACHOL) tablet 10 mg    guaiFENesin (ROBITUSSIN) 100 mg/5 mL oral liquid 400 mg    famotidine (PF) (PEPCID) 20 mg in 0.9% sodium chloride 10 mL injection    thiamine (B-1) injection 100 mg    sodium chloride (NS) flush 5-40 mL    sodium chloride (NS) flush 5-40 mL    acetaminophen (TYLENOL) tablet 650 mg    Or    acetaminophen (TYLENOL) suppository 650 mg    polyethylene glycol (MIRALAX) packet 17 g    bisacodyL (DULCOLAX) tablet 5 mg    ondansetron (ZOFRAN ODT) tablet 4 mg    Or    ondansetron (ZOFRAN) injection 4 mg    nicotine (NICODERM CQ) 7 mg/24 hr patch 1 Patch    guaiFENesin ER (MUCINEX) tablet 1,200 mg    benzonatate (TESSALON) capsule 200 mg    multivitamin with folic acid (ONE DAILY WITH FOLIC ACID) tablet 1 Tablet    tiotropium bromide (SPIRIVA RESPIMAT) 2.5 mcg /actuation    budesonide-formoteroL (SYMBICORT) 160-4.5 mcg/actuation HFA inhaler 2 Puff      Patient PCP: Catherine Foster MD  PMH:  has a past medical history of COPD (chronic obstructive pulmonary disease) ([address] Essential hypertension, Family history of skin cancer, Osteoporosis, and Tobacco abuse. PSH:   has a past surgical history that includes hx mohs procedure (02/28/2018). FHX: family history includes Hypertension in her mother. SHX:  reports that she has been smoking. She started smoking about 60 years ago. She has a 60.00 pack-year smoking history. She has never used smokeless tobacco.     ROS:    Review of Systems   Constitutional: Positive for malaise/fatigue and weight loss. HENT: Negative. Eyes: Negative. Respiratory: Positive for cough, sputum production, shortness of breath and wheezing. Cardiovascular: Positive for chest pain and orthopnea. Gastrointestinal: Negative. Genitourinary: Negative. Musculoskeletal: Negative. Skin: Negative. Neurological: Negative. Psychiatric/Behavioral: Negative. Objective:     Vital Signs: Telemetry:    normal sinus rhythm Intake/Output:   Visit Vitals  BP (!) 145/69   Pulse 87   Temp 97.9 °F (36.6 °C)   Resp 21   Ht 5' 4\" (1.626 m)   Wt 36.8 kg (81 lb 3.2 oz)   SpO2 95%   BMI 13.94 kg/m²       Temp (24hrs), Av °F (36.7 °C), Min:97.8 °F (36.6 °C), Max:98.2 °F (36.8 °C)        O2 Device: None (Room air) O2 Flow Rate (L/min): 2 l/min       Wt Readings from Last 4 Encounters:   21 36.8 kg (81 lb 3.2 oz)   21 45.4 kg (100 lb)        No intake or output data in the 24 hours ending 21 0942    Last shift:      No intake/output data recorded. Last 3 shifts: No intake/output data recorded. Physical Exam:     Physical Exam  Constitutional:       Appearance: Normal appearance. She is ill-appearing and diaphoretic. Comments: Thin frail appearing   HENT:      Head: Normocephalic and atraumatic. Nose: Nose normal.      Mouth/Throat:      Mouth: Mucous membranes are moist.   Eyes:      Pupils: Pupils are equal, round, and reactive to light. Cardiovascular:      Rate and Rhythm: Normal rate and regular rhythm. Pulses: Normal pulses. Pulmonary:      Comments: Poor breath sounds with prolongation of exhalation no wheezes no rales  Abdominal:      General: Abdomen is flat. Bowel sounds are normal.      Palpations: Abdomen is soft. Musculoskeletal:         General: Normal range of motion. Cervical back: Normal range of motion and neck supple. Skin:     General: Skin is warm. Neurological:      General: No focal deficit present. Mental Status: She is alert. Psychiatric:         Mood and Affect: Mood normal.          Labs:    Recent Labs     11/16/21  0640 11/15/21  0640 11/14/21  0800   WBC 12.1* 17.9* 18.0*   HGB 12.4 12.7 13.3    266 285     Recent Labs     11/16/21  0640 11/15/21  0640 11/14/21  0800    130* 132*   K 4.3 4.4 4.2    98 102   CO2 26 26 27   * 110* 91   BUN 15 17 22*   CREA 0.49* 0.49* 0.58   CA 8.6 8.6 8.9     11/14 room air oxygen saturation 92% at rest  Imaging:    CXR Results  (Last 48 hours)               11/15/21 1205  XR CHEST PORT Final result    Impression:  Pulmonary emphysema changes without acute infiltrate or atelectasis. Question air beneath the right hemidiaphragm. Recommend CT abdomen and pelvis if   needed for additional assessment. Narrative:  Portable chest:       History:Rib pain       COMPARISON: Portable chest 11/9/2021       Lungs show hyperaeration. There are increased parenchymal/interstitial markings   compatible with fibrosis. No acute infiltrate or pleural fluid. Heart is normal   size. Calcified plaque involving the aortic arch with aortic uncoiling. Question   air beneath the right hemidiaphragm. Results from Hospital Encounter encounter on 11/09/21    XR CHEST PORT    Narrative  Portable chest:    History:Rib pain    COMPARISON: Portable chest 11/9/2021    Lungs show hyperaeration. There are increased parenchymal/interstitial markings  compatible with fibrosis. No acute infiltrate or pleural fluid. Heart is normal  size. Calcified plaque involving the aortic arch with aortic uncoiling. Question  air beneath the right hemidiaphragm. Impression  Pulmonary emphysema changes without acute infiltrate or atelectasis.     Question air beneath the right hemidiaphragm. Recommend CT abdomen and pelvis if  needed for additional assessment. XR SWALLOW FUNC VIDEO    Narrative  Modified barium swallow, 11/10/2021    History: R 13.13    Procedure: This study was performed in conjunction with the speech pathologist.  Fluoroscopic evaluation was performed as the patient ingested various  consistencies of barium. An episode of penetration was seen with thin consistency. No verónica aspiration  occurred. The patient ingested nectar, honey and pudding consistencies without penetration  or aspiration. There was significant hypopharyngeal residue which the patient was not able to  fully clear with repeated swallowing. Fluoroscopy time: 1:28 minutes. Dose: 10 mGy. Number of images: 1. Impression  Penetration with thin consistency. No verónica aspiration. Significant hypopharyngeal residue. XR CHEST PORT    Narrative  Chest single view. Coarse reticulonodular markings through lungs. Less than 1 cm nodular density  right lung base. No gross interstitial or alveolar pulmonary edema. Cardiac  silhouette within normal limits. Elongated thoracic aorta with atherosclerotic  change. No pneumothorax or sizable pleural effusion. CT chest findings 3/4/2021 noted. Consider CT follow-up to demonstrate stability. Results from [place] encounter on 11/09/21    CTA CHEST W OR W WO CONT    Narrative  CTA chest.    Axial images are reviewed along with reformatted sagittal/coronal/MIP images. 100 mL Isovue 370 administered. Dose reduction: All CT scans at this facility are performed using dose reduction  optimization techniques as appropriate to a performed exam including the  following-  automated exposure control, adjustments of mA and/or Kv according to patient  size, or use of iterative reconstructive technique. Apical pleural-parenchymal change. Emphysema. 3 mm lingular LINA nodule.   Focal RML atelectasis; this likely accounts for recent plain film finding. Minor posteromedial basilar subsegmental atelectasis. Enhanced images reveal advanced atherosclerotic change thoracic aorta with  extension and great vessels. Normal caliber pulmonary arterial trunk. Normal  contrast opacification pulmonary arterial trunk and its branch vessels; no CT  evidence for PE. Central dependent bronchi are incompletely aerated suggesting mucous content. No  mediastinal lymphadenopathy. No pericardial effusion. Atherosclerosis continues into the imaged upper abdomen. Impression  Emphysema. RML focal atelectasis. Minor posteromedial basilar subsegmental atelectasis. 3 mm LINA nodule. No CT evidence for PE. Thoracoabdominal aorta atherosclerosis. Likely mucous central dependent bronchi. IMPRESSION:   1. Acute hypoxic respiratory failure   2. Chronic Obstructive Pulmonary Disease no wheezing currently on Spiriva and Symbicort inhaler with oral prednisone  3. Left upper lobe 3 mm lung nodule  4. Bronchiectasis  5. Community-acquired pneumonia  6. COPD cachexia      RECOMMENDATIONS/PLAN:   1. Currently on room air overnight pulse oximetry shows no desaturation  2. Continue Symbicort and Spiriva inhalers  prednisone to 20 mg once a day  3. S/P PEG tube placement tolerating PEG tube feeding  4. Patient on Zithromax   5.  Chest x-ray showing air and air under the diaphragm most likely secondary to PEG tube placement we will repeat chest x-ray today       Nydia Zaragoza MD

## 2021-12-08 PROBLEM — I48.91 ATRIAL FIBRILLATION WITH RVR (HCC): Status: ACTIVE | Noted: 2021-01-01

## 2021-12-08 PROBLEM — J12.82 PNEUMONIA DUE TO COVID-19 VIRUS: Status: ACTIVE | Noted: 2021-01-01

## 2021-12-08 PROBLEM — I48.91 NEW ONSET ATRIAL FIBRILLATION (HCC): Status: ACTIVE | Noted: 2021-01-01

## 2021-12-08 PROBLEM — U07.1 PNEUMONIA DUE TO COVID-19 VIRUS: Status: ACTIVE | Noted: 2021-01-01

## 2021-12-08 NOTE — CONSULTS
Consult    NAME: Brant Limon   :  1945   MRN:  [id number]     Date/Time:  2021 11:36 AM    Patient PCP: Serge Hernandez MD  ________________________________________________________________________    This is an inpatient cardiology consultation requested by Dr. Niko Leblanc for atrial fibrillation with rapid ventricle response. Assessment:     1. New onset atrial fibrillation with rapid ventricle response which in this patient could be related to COVID infection. Patient now with rate control by  IV Cardizem converted to sinus rhythm. 2. COPD exacerbation  3. Covid pneumonitis  4. Mildly elevated troponin due to type II non-STEMI        Plan:     1. Patient IV Cardizem can be changed to oral route and patient can be continued on home regimen of beta-blocker for now. 2.   Agree with Lovenox for anticoagulation which can be changed to Eliquis or             Coumadin at the time of discharge. 3.   Echocardiogram for LV function and valvular function. []        High complexity decision making was performed        Subjective:   CHIEF COMPLAINT: Palpitations/heart racing with shortness of breath    HISTORY OF PRESENT ILLNESS:     This is a 52-year-old  female with a history of COPD, hypertension and no known prior history of coronary artery disease who presented to emergency room with complaints of increasing shortness of breath and heart racing of several hours duration. Patient while in hospital has been noted for atrial fibrillation with rapid ventricle response with heart rate in 140s to 150s range. Patient  in hospital was started on IV Cardizem drip and later spontaneously converted to sinus rhythm. Patient in ER has been tested positive for Covid infection and several family members at home have been Covid positive as well.     Past Medical History:   Diagnosis Date    COPD (chronic obstructive pulmonary disease) ([address]     Essential hypertension     Family history of skin cancer     Osteoporosis     Tobacco abuse       Past Surgical History:   Procedure Laterality Date    HX MOHS PROCEDURES  02/28/2018    BCC L lateral forehead by Dr. Rose Marie Estrada      No Known Allergies   Meds:  See below  Social History     Tobacco Use    Smoking status: Current Every Day Smoker     Packs/day: 1.00     Years: 60.00     Pack years: 60.00     Start date: 3/4/1961    Smokeless tobacco: Never Used   Substance Use Topics    Alcohol use: Not on file      Family History   Problem Relation Age of Onset    Hypertension Mother        REVIEW OF SYSTEMS:     []         Unable to obtain  ROS due to ---   [x]         Total of 12 systems reviewed as follows:    Constitutional: negative fever, negative chills, negative weight loss  Eyes:   negative visual changes  ENT:   negative sore throat, tongue or lip swelling  Respiratory:  Positive for cough and dyspnea  Cards:  negative for chest pain,, lower extremity edema  GI:   negative for nausea, vomiting, diarrhea, and abdominal pain  Genitourinary: negative for frequency, dysuria  Integument:  negative for rash   Hematologic:  negative for easy bruising and gum/nose bleeding  Musculoskel: negative for myalgias,  back pain  Neurological:  negative for headaches, dizziness, vertigo, weakness  Behavl/Psych: negative for feelings of anxiety, depression     Pertinent Positives include :    Objective:      Physical Exam:    Last 24hrs VS reviewed since prior progress note. Most recent are:    Visit Vitals  /78   Pulse (!) 128   Temp 98.4 °F (36.9 °C)   Resp 21   Ht 5' 5\" (1.651 m)   Wt 38.6 kg (85 lb)   SpO2 98%   BMI 14.14 kg/m²       Intake/Output Summary (Last 24 hours) at 12/8/2021 1136  Last data filed at 12/8/2021 0344  Gross per 24 hour   Intake 500 ml   Output    Net 500 ml        General Appearance: Well developed, well nourished, alert & oriented x 3,    no acute distress.   Ears/Nose/Mouth/Throat: Pupils equal and round, Hearing grossly normal.  Neck: Supple. JVP within normal limits. Carotids good upstrokes, with no bruit. Chest: Lungs with scattered rhonchi to auscultation bilaterally. Cardiovascular: Regular rate and rhythm, S1S2 normal, no murmur, rubs, gallops. Abdomen: Soft, non-tender, bowel sounds are active. No organomegaly. Extremities: No edema bilaterally. Femoral pulses +2, Distal Pulses +1. Skin: Warm and dry. Neuro: CN II-XII grossly intact, Strength and sensation grossly intact. []         Post-cath site without hematoma, bruit, tenderness, or thrill. Distal pulses intact. Data:      Telemetry: Sinus rhythm    EKG:  Atrial fibrillation with rapid ventricular response. Nonspecific rate related ST-T wave changes    CTA CHEST W OR W WO CONT   Final Result      No pulmonary emboli. Patchy opacities in the lungs may be due to atelectasis, pneumonia or a   combination of these. Pulmonary emphysema. Atherosclerosis including coronary   arteries. Follow-up as clinically indicated. Please see full report. XR CHEST PORT   Final Result      Pulmonary emphysema. Opacities in the lungs may represent atelectasis, pneumonia   or a combination of these. Follow-up as clinically indicated. Prior to Admission medications    Medication Sig Start Date End Date Taking? Authorizing Provider   budesonide-formoteroL (SYMBICORT) 160-4.5 mcg/actuation HFAA Take 2 Puffs by inhalation two (2) times a day. 11/16/21   José Miguel Ortiz PA-C   multivitamin with folic acid (ONE DAILY WITH FOLIC ACID) 537 mcg tab tablet Take 1 Tablet by mouth daily for 30 days. 11/16/21 12/16/21  José Miguel Ortiz PA-C   albuterol (PROVENTIL HFA, VENTOLIN HFA, PROAIR HFA) 90 mcg/actuation inhaler Take 2 Puffs by inhalation every six (6) hours as needed for Wheezing or Shortness of Breath for up to 30 days. 11/16/21 12/16/21  José Miguel Ortiz PA-C   famotidine (Pepcid) 40 mg tablet Take 1 Tablet by mouth daily for 30 days.  11/16/21 12/16/21  Bishop Brendan Mullins PA-C   ibandronate (BONIVA) 150 mg tablet  2/12/18   Provider, Historical   metoprolol tartrate (LOPRESSOR) 50 mg tablet Take  by mouth two (2) times a day. Provider, Historical   amLODIPine (NORVASC) 5 mg tablet Take 5 mg by mouth daily. Provider, Historical   lovastatin (MEVACOR) 10 mg tablet Take  by mouth nightly. Provider, Historical   ergocalciferol (VITAMIN D2) 50,000 unit capsule Take 50,000 Units by mouth. Provider, Historical   ASPIRIN PO Take  by mouth. Provider, Historical       Recent Results (from the past 24 hour(s))   EKG, 12 LEAD, INITIAL    Collection Time: 12/08/21  1:49 AM   Result Value Ref Range    Ventricular Rate 145 BPM    Atrial Rate 170 BPM    QRS Duration 82 ms    Q-T Interval 302 ms    QTC Calculation (Bezet) 469 ms    Calculated R Axis 63 degrees    Calculated T Axis 109 degrees    Diagnosis       Atrial fibrillation with rapid ventricular response  Septal infarct , age undetermined  Abnormal ECG  When compared with ECG of 09-NOV-2021 07:35,  Atrial fibrillation has replaced Sinus rhythm  Vent.  rate has increased BY  48 BPM  Confirmed by Jason Rossi ([id number]) on 12/8/2021 7:18:51 AM     COVID-19 RAPID TEST    Collection Time: 12/08/21  1:54 AM   Result Value Ref Range    Specimen source Please find results under separate order      COVID-19 rapid test DETECTED (A) Not Detected     CBC WITH AUTOMATED DIFF    Collection Time: 12/08/21  2:05 AM   Result Value Ref Range    WBC 11.6 (H) 3.6 - 11.0 K/uL    RBC 3.64 (L) 3.80 - 5.20 M/uL    HGB 11.1 (L) 11.5 - 16.0 g/dL    HCT 33.7 (L) 35.0 - 47.0 %    MCV 92.6 80.0 - 99.0 FL    MCH 30.5 26.0 - 34.0 PG    MCHC 32.9 30.0 - 36.5 g/dL    RDW 16.7 (H) 11.5 - 14.5 %    PLATELET 278 269 - 373 K/uL    MPV 10.2 8.9 - 12.9 FL    NRBC 0.0 0.0  WBC    ABSOLUTE NRBC 0.00 0.00 - 0.01 K/uL    NEUTROPHILS 75 32 - 75 %    LYMPHOCYTES 15 12 - 49 %    MONOCYTES 10 5 - 13 %    EOSINOPHILS 0 0 - 7 %    BASOPHILS 0 0 - 1 %    IMMATURE GRANULOCYTES 0 0 - 0.5 %    ABS. NEUTROPHILS 8.8 (H) 1.8 - 8.0 K/UL    ABS. LYMPHOCYTES 1.7 0.8 - 3.5 K/UL    ABS. MONOCYTES 1.1 (H) 0.0 - 1.0 K/UL    ABS. EOSINOPHILS 0.0 0.0 - 0.4 K/UL    ABS. BASOPHILS 0.0 0.0 - 0.1 K/UL    ABS. IMM. GRANS. 0.1 (H) 0.00 - 0.04 K/UL    DF AUTOMATED     METABOLIC PANEL, COMPREHENSIVE    Collection Time: 12/08/21  2:05 AM   Result Value Ref Range    Sodium 127 (L) 136 - 145 mmol/L    Potassium 4.3 3.5 - 5.1 mmol/L    Chloride 96 (L) 97 - 108 mmol/L    CO2 25 21 - 32 mmol/L    Anion gap 6 5 - 15 mmol/L    Glucose 172 (H) 65 - 100 mg/dL    BUN 21 (H) 6 - 20 mg/dL    Creatinine 0.50 (L) 0.55 - 1.02 mg/dL    BUN/Creatinine ratio 42 (H) 12 - 20      GFR est AA >60 >60 ml/min/1.73m2    GFR est non-AA >60 >60 ml/min/1.73m2    Calcium 8.0 (L) 8.5 - 10.1 mg/dL    Bilirubin, total 0.4 0.2 - 1.0 mg/dL    AST (SGOT) 51 (H) 15 - 37 U/L    ALT (SGPT) 39 12 - 78 U/L    Alk. phosphatase 67 45 - 117 U/L    Protein, total 6.4 6.4 - 8.2 g/dL    Albumin 3.0 (L) 3.5 - 5.0 g/dL    Globulin 3.4 2.0 - 4.0 g/dL    A-G Ratio 0.9 (L) 1.1 - 2.2     LACTIC ACID    Collection Time: 12/08/21  2:05 AM   Result Value Ref Range    Lactic acid 1.4 0.4 - 2.0 mmol/L   TROPONIN-HIGH SENSITIVITY    Collection Time: 12/08/21  2:08 AM   Result Value Ref Range    Troponin-High Sensitivity 58 (HH) 0 - 51 ng/L        Echo: Pending      Patient's  EKG and laboratory data were individually reviewed by me. Please send a copy of this dictation to above referring physician. Racheal Meadows MD    This dictation was done by Dragon computer voice recognition software. Occasionally grammatical, syntax and other interpretive errors escape final proof reading. Please feel free to call me for any clarification.

## 2021-12-08 NOTE — PROGRESS NOTES
Reason for Admission:   PNA/Covid/Afib                    RUR Score: 17%                 PCP: First and Last name:   Lorrie Cole MD     Name of Practice:    Are you a current patient: Yes/No: Yes   Approximate date of last visit: 3/4 weeks ago. Can you participate in a virtual visit if needed:   No  Do you (patient/family) have any concerns for transition/discharge? No              Plan for utilizing home health:Daughter verbally consented to Choice Letter to continue service with [address] [place] [address] . Referral sent via [employer]. Pt uses walker. Current Advanced Directive/Advance Care Plan:  Full Code      Healthcare Decision Maker:         Primary HCDM is daughter Christina Liang @ [phone])    Transition of Care Plan:D/C Plan is home with daughter Christina Liang @ [phone]) & she will transport pt home upon discharge.

## 2021-12-08 NOTE — ED NOTES
Pt in bed resting with eyes open. Pt in no acute distress at this time. Denies any pain. Writer will continue to monitor.

## 2021-12-08 NOTE — ACP (ADVANCE CARE PLANNING)
Advance Care Planning   Healthcare Decision Maker:       Primary Decision Maker: Stevo Duarte - Daughter - [phone]

## 2021-12-08 NOTE — PROGRESS NOTES
12/8/21. [facility] per [employer] to continue service upon discharge. Contact info: Romie Parrish @ [phone].

## 2021-12-08 NOTE — H&P
GENERAL GENERIC H&P/CONSULT    Subjective:  50-year-old female with past medical history including hypertension, COPD who was brought to the emergency department for shortness of breath, cough and generalized weakness. She was saturating in the 80s on room air. Multiple family members are positive for COVID-19 and patient also has tested positive COVID-19 here in the ER. Currently she is on 4 L of oxygen via nasal cannula, SPO2 in the 90s. CTA chest has ruled out PE, but shows areas of patchy opacity and emphysematous changes. Patient also tachycardic, monitor consistent with A. fib with RVR which is a new diagnosis for her. Patient is referred for admission for further management. Past Medical History:   Diagnosis Date    COPD (chronic obstructive pulmonary disease) ([address]     Essential hypertension     Family history of skin cancer     Osteoporosis     Tobacco abuse       Past Surgical History:   Procedure Laterality Date    HX MOHS PROCEDURES  02/28/2018    BCC L lateral forehead by Dr. Marine Alford       Prior to Admission medications    Medication Sig Start Date End Date Taking? Authorizing Provider   budesonide-formoteroL (SYMBICORT) 160-4.5 mcg/actuation HFAA Take 2 Puffs by inhalation two (2) times a day. 11/16/21   Preet Ortiz PA-C   multivitamin with folic acid (ONE DAILY WITH FOLIC ACID) 085 mcg tab tablet Take 1 Tablet by mouth daily for 30 days. 11/16/21 12/16/21  Preet Ortiz PA-C   albuterol (PROVENTIL HFA, VENTOLIN HFA, PROAIR HFA) 90 mcg/actuation inhaler Take 2 Puffs by inhalation every six (6) hours as needed for Wheezing or Shortness of Breath for up to 30 days. 11/16/21 12/16/21  Preet Ortiz PA-C   famotidine (Pepcid) 40 mg tablet Take 1 Tablet by mouth daily for 30 days. 11/16/21 12/16/21  Preet Ortiz PA-C   ibandronate (BONIVA) 150 mg tablet  2/12/18   Provider, Historical   metoprolol tartrate (LOPRESSOR) 50 mg tablet Take  by mouth two (2) times a day.     Provider, Historical   amLODIPine (NORVASC) 5 mg tablet Take 5 mg by mouth daily. Provider, Historical   lovastatin (MEVACOR) 10 mg tablet Take  by mouth nightly. Provider, Historical   ergocalciferol (VITAMIN D2) 50,000 unit capsule Take 50,000 Units by mouth. Provider, Historical   ASPIRIN PO Take  by mouth. Provider, Historical     No Known Allergies   Social History     Tobacco Use    Smoking status: Current Every Day Smoker     Packs/day: 1.00     Years: 60.00     Pack years: 60.00     Start date: 3/4/1961    Smokeless tobacco: Never Used   Substance Use Topics    Alcohol use: Not on file      Family History   Problem Relation Age of Onset    Hypertension Mother       Review of Systems   Constitutional: Negative for appetite change, chills, diaphoresis and fever. HENT: Negative. Eyes: Negative for pain and visual disturbance. Respiratory: Positive for cough and shortness of breath. Cardiovascular: Negative. Gastrointestinal: Negative. Endocrine: Negative. Genitourinary: Negative. Musculoskeletal: Negative. Neurological: Negative. Objective:    12/07 1901 - 12/08 0700  In: 500 [I.V.:500]  Out: -   No intake/output data recorded. Patient Vitals for the past 8 hrs:   BP Temp Pulse Resp SpO2 Height Weight   12/08/21 0619 131/78  (!) 128 21 98 %     12/08/21 0535 116/73  (!) 107 23 99 %     12/08/21 0531   (!) 103       12/08/21 0400 116/88  (!) 131 25 94 %     12/08/21 0346   (!) 131       12/08/21 0251  98.4 °F (36.9 °C)        12/08/21 0239 129/66  (!) 146 21 100 %     12/08/21 0146 118/62  (!) 142 27 100 % 5' 5\" (1.651 m) 38.6 kg (85 lb)     Physical Exam  Constitutional:       General: She is not in acute distress. Appearance: Normal appearance. HENT:      Head: Normocephalic and atraumatic. Eyes:      Extraocular Movements: Extraocular movements intact.       Conjunctiva/sclera: Conjunctivae normal.      Pupils: Pupils are equal, round, and reactive to light. Cardiovascular:      Rate and Rhythm: Tachycardia present. Rhythm irregular. Pulses: Normal pulses. Heart sounds: Normal heart sounds. No murmur heard. No friction rub. No gallop. Pulmonary:      Effort: Pulmonary effort is normal.      Breath sounds: Normal breath sounds. Abdominal:      General: Bowel sounds are normal.      Palpations: Abdomen is soft. Musculoskeletal:         General: Normal range of motion. Cervical back: Neck supple. Neurological:      General: No focal deficit present. Mental Status: She is alert and oriented to person, place, and time. Mental status is at baseline. Cranial Nerves: No cranial nerve deficit. Motor: No weakness. Labs:    Recent Results (from the past 24 hour(s))   COVID-19 RAPID TEST    Collection Time: 12/08/21  1:54 AM   Result Value Ref Range    Specimen source Please find results under separate order      COVID-19 rapid test DETECTED (A) Not Detected     CBC WITH AUTOMATED DIFF    Collection Time: 12/08/21  2:05 AM   Result Value Ref Range    WBC 11.6 (H) 3.6 - 11.0 K/uL    RBC 3.64 (L) 3.80 - 5.20 M/uL    HGB 11.1 (L) 11.5 - 16.0 g/dL    HCT 33.7 (L) 35.0 - 47.0 %    MCV 92.6 80.0 - 99.0 FL    MCH 30.5 26.0 - 34.0 PG    MCHC 32.9 30.0 - 36.5 g/dL    RDW 16.7 (H) 11.5 - 14.5 %    PLATELET 048 158 - 423 K/uL    MPV 10.2 8.9 - 12.9 FL    NRBC 0.0 0.0  WBC    ABSOLUTE NRBC 0.00 0.00 - 0.01 K/uL    NEUTROPHILS 75 32 - 75 %    LYMPHOCYTES 15 12 - 49 %    MONOCYTES 10 5 - 13 %    EOSINOPHILS 0 0 - 7 %    BASOPHILS 0 0 - 1 %    IMMATURE GRANULOCYTES 0 0 - 0.5 %    ABS. NEUTROPHILS 8.8 (H) 1.8 - 8.0 K/UL    ABS. LYMPHOCYTES 1.7 0.8 - 3.5 K/UL    ABS. MONOCYTES 1.1 (H) 0.0 - 1.0 K/UL    ABS. EOSINOPHILS 0.0 0.0 - 0.4 K/UL    ABS. BASOPHILS 0.0 0.0 - 0.1 K/UL    ABS. IMM.  GRANS. 0.1 (H) 0.00 - 0.04 K/UL    DF AUTOMATED     METABOLIC PANEL, COMPREHENSIVE    Collection Time: 12/08/21  2:05 AM Result Value Ref Range    Sodium 127 (L) 136 - 145 mmol/L    Potassium 4.3 3.5 - 5.1 mmol/L    Chloride 96 (L) 97 - 108 mmol/L    CO2 25 21 - 32 mmol/L    Anion gap 6 5 - 15 mmol/L    Glucose 172 (H) 65 - 100 mg/dL    BUN 21 (H) 6 - 20 mg/dL    Creatinine 0.50 (L) 0.55 - 1.02 mg/dL    BUN/Creatinine ratio 42 (H) 12 - 20      GFR est AA >60 >60 ml/min/1.73m2    GFR est non-AA >60 >60 ml/min/1.73m2    Calcium 8.0 (L) 8.5 - 10.1 mg/dL    Bilirubin, total 0.4 0.2 - 1.0 mg/dL    AST (SGOT) 51 (H) 15 - 37 U/L    ALT (SGPT) 39 12 - 78 U/L    Alk. phosphatase 67 45 - 117 U/L    Protein, total 6.4 6.4 - 8.2 g/dL    Albumin 3.0 (L) 3.5 - 5.0 g/dL    Globulin 3.4 2.0 - 4.0 g/dL    A-G Ratio 0.9 (L) 1.1 - 2.2     LACTIC ACID    Collection Time: 12/08/21  2:05 AM   Result Value Ref Range    Lactic acid 1.4 0.4 - 2.0 mmol/L   TROPONIN-HIGH SENSITIVITY    Collection Time: 12/08/21  2:08 AM   Result Value Ref Range    Troponin-High Sensitivity 58 (HH) 0 - 51 ng/L       Assessment:  Active Problems:    New onset atrial fibrillation ([facility] (12/8/2021)      Atrial fibrillation with RVR ([facility] (12/8/2021)      Pneumonia due to COVID-19 virus (12/8/2021)      Pneumonia secondary to COVID-19  -Unvaccinated, symptom onset yesterday  -Hypoxic requiring 4 L of oxygen via nasal cannula  -Dexamethasone, doxycycline, ceftriaxone  -Consent obtained for remdesivir, pharmacy consult placed  -Obtain ESR, CRP, ferritin, procalcitonin, D-dimer  -Supportive care  -Encourage awake prone/modified prone position    New onset atrial fibrillation  -Rate control with Cardizem drip  -Continue oral metoprolol  -Therapeutic anticoagulation with Lovenox.   Indication/risk and benefits of anticoagulation treatment discussed with patient    Hypertension  -Amlodipine, metoprolol    COPD  -Symbicort, DuoNeb    DVT prophylaxis: Lovenox    Full code      Signed:  Esperanza Bender MD 12/8/2021

## 2021-12-08 NOTE — PROGRESS NOTES
Reason for Readmission:   Covid/PNA/Afib         RUR Score/Risk Level:     17%    PCP: First and Last name: Winsome Sadler    Name of Practice:    Are you a current patient: Yes/No: Yes   Approximate date of last visit: 3/4 weeks ago. Can you participate in a virtual visit with your PCP: No    Is a Care Conference indicated: No      Did you attend your follow up appointment (s): If not, why not:Yes         Resources/supports as identified by patient/family:   Home Health & T/F ( [facility])       Top Challenges facing patient (as identified by patient/family and CM): Finances/Medication cost?    No   Transportation   No     Support system or lack thereof? No    Living arrangements? No        Self-care/ADLs/Cognition? No          Current Advanced Directive/Advance Care Plan:             Plan for utilizing home health:  Daughter verbally consented to Choice Letter to continue services with  [facility] for T/F supplies. Referral sent vi [facility]. Pt uses walker. Transition of Care Plan:    Based on readmission, the patient's previous Plan of Care   has been evaluated and/or modified. The current Transition of Care Plan is:   D/C Plan is home with daughter/home health & daughter will transport home upon discharge.

## 2021-12-08 NOTE — Clinical Note
Status[de-identified] INPATIENT [101]   Type of Bed: Telemetry [19]   Cardiac Monitoring Required?: Yes   Inpatient Hospitalization Certified Necessary for the Following Reasons: 3.  Patient receiving treatment that can only be provided in an inpatient setting (further clarification in H&P documentation)   Admitting Diagnosis: Pneumonia due to COVID-19 virus [[id number]]   Admitting Diagnosis: Atrial fibrillation with RVR ([facility] [[id number]]   Admitting Diagnosis: New onset atrial fibrillation [facility] [[id number]]   Admitting Physician: Antonio Marion   Attending Physician: Antonio Marion   Estimated Length of Stay: 2 Midnights   Discharge Plan[de-identified] Home with Office Follow-up

## 2021-12-08 NOTE — ACP (ADVANCE CARE PLANNING)
Advance Care Planning   Healthcare Decision Maker:       Primary Decision Maker: Margaret Ladd - Daughter - [phone]    Secondary Decision Maker: Joi Pastrana - Daughter - [phone]

## 2021-12-08 NOTE — PROGRESS NOTES
Hospitalist Progress Note               Daily Progress Note: 12/8/2021      Subjective: The patient is seen for follow up. She is a 78-year-old female with history of COPD and hypertension, brought to the ED overnight with shortness of breath and generalized weakness. She has had cough as well. Arrival she had oxygen saturations in the 80s on room air. She tested positive for COVID-19. Multiple family members are also positive. CTA was negative for PE. She was also found to have rapid atrial fibrillation which is new for her    She is currently on 3 L nasal cannula and breathing comfortably    Remdesivir has been started    Patient is unvaccinated for Covid.   She has been sick for 2 or 3 days    Problem List:  Problem List as of 12/8/2021 Date Reviewed: 11/12/2021          Codes Class Noted - Resolved    New onset atrial fibrillation ([facility] ICD-10-CM: I48.91  ICD-9-CM: 427.31  12/8/2021 - Present        Atrial fibrillation with RVR ([facility] ICD-10-CM: I48.91  ICD-9-CM: 427.31  12/8/2021 - Present        Pneumonia due to COVID-19 virus ICD-10-CM: U07.1, J12.82  ICD-9-CM: 480.8, 079.89  12/8/2021 - Present        Dysphagia ICD-10-CM: R13.10  ICD-9-CM: 787.20  11/10/2021 - Present        Failure to thrive (child) ICD-10-CM: R62.51  ICD-9-CM: 783.41  11/10/2021 - Present        Severe protein-calorie malnutrition ([facility] ICD-10-CM: E43  ICD-9-CM: 262  11/10/2021 - Present        Unintentional weight loss ICD-10-CM: R63.4  ICD-9-CM: 783.21  11/10/2021 - Present        COPD with acute exacerbation ([facility] ICD-10-CM: J44.1  ICD-9-CM: 491.21  11/9/2021 - Present        Acute respiratory failure with hypoxia ([facility] ICD-10-CM: J96.01  ICD-9-CM: 518.81  11/9/2021 - Present        Hypertension ICD-10-CM: I10  ICD-9-CM: 401.9  11/9/2021 - Present        Tobacco abuse ICD-10-CM: Z72.0  ICD-9-CM: 305.1  11/9/2021 - Present        Osteoporosis ICD-10-CM: M81.0  ICD-9-CM: 733.00  11/9/2021 - Present        Community acquired pneumonia ICD-10-CM: J18.9  ICD-9-CM: 486  11/9/2021 - Present        Bronchiectasis ([address] ICD-10-CM: J47.9  ICD-9-CM: 494.0  11/9/2021 - Present              Medications reviewed  Current Facility-Administered Medications   Medication Dose Route Frequency    amLODIPine (NORVASC) tablet 5 mg  5 mg Oral DAILY    metoprolol tartrate (LOPRESSOR) tablet 50 mg  50 mg Oral BID    sodium chloride (NS) flush 5-40 mL  5-40 mL IntraVENous Q8H    sodium chloride (NS) flush 5-40 mL  5-40 mL IntraVENous PRN    acetaminophen (TYLENOL) tablet 650 mg  650 mg Oral Q6H PRN    Or    acetaminophen (TYLENOL) suppository 650 mg  650 mg Rectal Q6H PRN    polyethylene glycol (MIRALAX) packet 17 g  17 g Oral DAILY PRN    ondansetron (ZOFRAN ODT) tablet 4 mg  4 mg Oral Q8H PRN    Or    ondansetron (ZOFRAN) injection 4 mg  4 mg IntraVENous Q6H PRN    dexamethasone (PF) (DECADRON) 10 mg/mL injection 6 mg  6 mg IntraVENous Q24H    dilTIAZem (CARDIZEM) 125 mg/125 mL (1 mg/mL) dextrose 5% infusion  2-10 mg/hr IntraVENous TITRATE    enoxaparin (LOVENOX) injection 40 mg  1 mg/kg SubCUTAneous DAILY    doxycycline (VIBRAMYCIN) 100 mg in 0.9% sodium chloride (MBP/ADV) 100 mL MBP  100 mg IntraVENous Q12H    cefTRIAXone (ROCEPHIN) 1 g in sterile water (preservative free) 10 mL IV syringe  1 g IntraVENous Q24H    remdesivir 200 mg in 0.9% sodium chloride 250 mL IVPB  200 mg IntraVENous ONCE    Followed by   Yary Blazing ON 12/9/2021] remdesivir 100 mg in 0.9% sodium chloride 250 mL IVPB  100 mg IntraVENous Q24H     Current Outpatient Medications   Medication Sig    budesonide-formoteroL (SYMBICORT) 160-4.5 mcg/actuation HFAA Take 2 Puffs by inhalation two (2) times a day.  multivitamin with folic acid (ONE DAILY WITH FOLIC ACID) 766 mcg tab tablet Take 1 Tablet by mouth daily for 30 days.     albuterol (PROVENTIL HFA, VENTOLIN HFA, PROAIR HFA) 90 mcg/actuation inhaler Take 2 Puffs by inhalation every six (6) hours as needed for Wheezing or Shortness of Breath for up to 30 days.  famotidine (Pepcid) 40 mg tablet Take 1 Tablet by mouth daily for 30 days.  ibandronate (BONIVA) 150 mg tablet     metoprolol tartrate (LOPRESSOR) 50 mg tablet Take  by mouth two (2) times a day.  amLODIPine (NORVASC) 5 mg tablet Take 5 mg by mouth daily.  lovastatin (MEVACOR) 10 mg tablet Take  by mouth nightly.  ergocalciferol (VITAMIN D2) 50,000 unit capsule Take 50,000 Units by mouth.  ASPIRIN PO Take  by mouth. Review of Systems:   A comprehensive review of systems was negative except for that written in the HPI. Objective:   Physical Exam:     Visit Vitals  /78   Pulse (!) 128   Temp 98.4 °F (36.9 °C)   Resp 21   Ht 5' 5\" (1.651 m)   Wt 38.6 kg (85 lb)   SpO2 98%   BMI 14.14 kg/m²    O2 Flow Rate (L/min): 10 l/min O2 Device: Non-rebreather mask    Temp (24hrs), Av.4 °F (36.9 °C), Min:98.4 °F (36.9 °C), Max:98.4 °F (36.9 °C)    No intake/output data recorded.  1901 -  0700  In: 500 [I.V.:500]  Out: -     General:   Awake and alert   Lungs: Few rhonchi bilateral   Chest wall:  No tenderness or deformity. Heart:  Regular rate and rhythm, S1, S2 normal, no murmur, click, rub or gallop. Abdomen:   Soft, non-tender. Bowel sounds normal. No masses,  No organomegaly. Extremities: Extremities normal, atraumatic, no cyanosis or edema. Pulses: 2+ and symmetric all extremities. Skin: Skin color, texture, turgor normal. No rashes or lesions   Neurologic: CNII-XII intact. No gross focal deficits         Data Review:       Recent Days:  Recent Labs     21  020   WBC 11.6*   HGB 11.1*   HCT 33.7*        Recent Labs     21   *   K 4.3   CL 96*   CO2 25   *   BUN 21*   CREA 0.50*   CA 8.0*   ALB 3.0*   TBILI 0.4   ALT 39     No results for input(s): PH, PCO2, PO2, HCO3, FIO2 in the last 72 hours.     24 Hour Results:  Recent Results (from the past 24 hour(s))   EKG, 12 LEAD, INITIAL    Collection Time: 12/08/21  1:49 AM   Result Value Ref Range    Ventricular Rate 145 BPM    Atrial Rate 170 BPM    QRS Duration 82 ms    Q-T Interval 302 ms    QTC Calculation (Bezet) 469 ms    Calculated R Axis 63 degrees    Calculated T Axis 109 degrees    Diagnosis       Atrial fibrillation with rapid ventricular response  Septal infarct , age undetermined  Abnormal ECG  When compared with ECG of 09-NOV-2021 07:35,  Atrial fibrillation has replaced Sinus rhythm  Vent. rate has increased BY  48 BPM  Confirmed by Juancho Kellogg ([id number]) on 12/8/2021 7:18:51 AM     COVID-19 RAPID TEST    Collection Time: 12/08/21  1:54 AM   Result Value Ref Range    Specimen source Please find results under separate order      COVID-19 rapid test DETECTED (A) Not Detected     CBC WITH AUTOMATED DIFF    Collection Time: 12/08/21  2:05 AM   Result Value Ref Range    WBC 11.6 (H) 3.6 - 11.0 K/uL    RBC 3.64 (L) 3.80 - 5.20 M/uL    HGB 11.1 (L) 11.5 - 16.0 g/dL    HCT 33.7 (L) 35.0 - 47.0 %    MCV 92.6 80.0 - 99.0 FL    MCH 30.5 26.0 - 34.0 PG    MCHC 32.9 30.0 - 36.5 g/dL    RDW 16.7 (H) 11.5 - 14.5 %    PLATELET 085 229 - 447 K/uL    MPV 10.2 8.9 - 12.9 FL    NRBC 0.0 0.0  WBC    ABSOLUTE NRBC 0.00 0.00 - 0.01 K/uL    NEUTROPHILS 75 32 - 75 %    LYMPHOCYTES 15 12 - 49 %    MONOCYTES 10 5 - 13 %    EOSINOPHILS 0 0 - 7 %    BASOPHILS 0 0 - 1 %    IMMATURE GRANULOCYTES 0 0 - 0.5 %    ABS. NEUTROPHILS 8.8 (H) 1.8 - 8.0 K/UL    ABS. LYMPHOCYTES 1.7 0.8 - 3.5 K/UL    ABS. MONOCYTES 1.1 (H) 0.0 - 1.0 K/UL    ABS. EOSINOPHILS 0.0 0.0 - 0.4 K/UL    ABS. BASOPHILS 0.0 0.0 - 0.1 K/UL    ABS. IMM.  GRANS. 0.1 (H) 0.00 - 0.04 K/UL    DF AUTOMATED     METABOLIC PANEL, COMPREHENSIVE    Collection Time: 12/08/21  2:05 AM   Result Value Ref Range    Sodium 127 (L) 136 - 145 mmol/L    Potassium 4.3 3.5 - 5.1 mmol/L    Chloride 96 (L) 97 - 108 mmol/L    CO2 25 21 - 32 mmol/L    Anion gap 6 5 - 15 mmol/L    Glucose 172 (H) 65 - 100 mg/dL BUN 21 (H) 6 - 20 mg/dL    Creatinine 0.50 (L) 0.55 - 1.02 mg/dL    BUN/Creatinine ratio 42 (H) 12 - 20      GFR est AA >60 >60 ml/min/1.73m2    GFR est non-AA >60 >60 ml/min/1.73m2    Calcium 8.0 (L) 8.5 - 10.1 mg/dL    Bilirubin, total 0.4 0.2 - 1.0 mg/dL    AST (SGOT) 51 (H) 15 - 37 U/L    ALT (SGPT) 39 12 - 78 U/L    Alk. phosphatase 67 45 - 117 U/L    Protein, total 6.4 6.4 - 8.2 g/dL    Albumin 3.0 (L) 3.5 - 5.0 g/dL    Globulin 3.4 2.0 - 4.0 g/dL    A-G Ratio 0.9 (L) 1.1 - 2.2     LACTIC ACID    Collection Time: 12/08/21  2:05 AM   Result Value Ref Range    Lactic acid 1.4 0.4 - 2.0 mmol/L   TROPONIN-HIGH SENSITIVITY    Collection Time: 12/08/21  2:08 AM   Result Value Ref Range    Troponin-High Sensitivity 58 (HH) 0 - 51 ng/L       CTA CHEST W OR W WO CONT   Final Result      No pulmonary emboli. Patchy opacities in the lungs may be due to atelectasis, pneumonia or a   combination of these. Pulmonary emphysema. Atherosclerosis including coronary   arteries. Follow-up as clinically indicated. Please see full report. XR CHEST PORT   Final Result      Pulmonary emphysema. Opacities in the lungs may represent atelectasis, pneumonia   or a combination of these. Follow-up as clinically indicated. Assessment:  COVID-19 with pneumonitis, non-severe disease    Acute respiratory failure with hypoxia    COPD with exacerbation    Atrial fibrillation with RVR, now back in sinus rhythm    Dehydration with hyponatremia    Hypertension    Chronic PEG tube feedings due to history of dysphagia    Plan:  Continue Remdesivir, start baricitinib  Continue doxycycline and dexamethasone  Start oral diltiazem     Care Plan discussed with: Patient/Family    Disposition: We will downgrade to remote telemetry bed    Total time spent with patient: 30 minutes.     Kishor Sparks MD

## 2021-12-08 NOTE — ED TRIAGE NOTES
COPD, HTN, called for SOB that started this morning. Whole house has covid, pt not tested. Found to be 80% on RA  PTA given terbutaline, decadron, and feeling better.  100% on 10L

## 2021-12-08 NOTE — PROGRESS NOTES
12/8/21. PCP is [facility] - seen 3/4 weeks ago. D/C Plan is home with daughter Hai Montemayor @ [phone]). Family will transport home. Daughter Gerard Neal @ [phone]) consented verbally to Choice Letter to continue service with [address] [place] [address] for T/F. Referral sent vis [facility]. Pt uses walker.

## 2021-12-08 NOTE — PROGRESS NOTES
12/8/21. Pt accepted back to [facility] @ [phone]. Est SOC: 12/9/21. Per [facility] pt still active with [facility].

## 2021-12-08 NOTE — ED NOTES
Assumed care of patient and received report from [address] Pt resting comfortably with the call bell in reach.

## 2021-12-08 NOTE — ED PROVIDER NOTES
EMERGENCY DEPARTMENT HISTORY AND PHYSICAL EXAM      Date: 12/8/2021  Patient Name: Poonam Moreno      History of Presenting Illness     Chief Complaint   Patient presents with    Respiratory Distress       History Provided By: Patient    HPI: Poonam Moreno, 77 y.o. female with a past medical history significant hypertension and COPD presents to the ED with cc of shortness of breath. History somewhat limited due to patient's respiratory distress, but EMS states they were called for shortness of breath. On the scene, they stated the patient's oxygen was in the 80s. Per EMS, the other members of her family she lives with all tested positive for COVID-19. Patient is unvaccinated for COVID-19. EMS states they gave dexamethasone and oxygen in route. There are no other complaints, changes, or physical findings at this time. PCP: Lea Rivas MD    Current Outpatient Medications   Medication Sig Dispense Refill    budesonide-formoteroL (SYMBICORT) 160-4.5 mcg/actuation HFAA Take 2 Puffs by inhalation two (2) times a day. 10.2 g 0    multivitamin with folic acid (ONE DAILY WITH FOLIC ACID) 777 mcg tab tablet Take 1 Tablet by mouth daily for 30 days. 30 Tablet 0    albuterol (PROVENTIL HFA, VENTOLIN HFA, PROAIR HFA) 90 mcg/actuation inhaler Take 2 Puffs by inhalation every six (6) hours as needed for Wheezing or Shortness of Breath for up to 30 days. 1 Each 1    famotidine (Pepcid) 40 mg tablet Take 1 Tablet by mouth daily for 30 days. 30 Tablet 0    ibandronate (BONIVA) 150 mg tablet       metoprolol tartrate (LOPRESSOR) 50 mg tablet Take  by mouth two (2) times a day.  amLODIPine (NORVASC) 5 mg tablet Take 5 mg by mouth daily.  lovastatin (MEVACOR) 10 mg tablet Take  by mouth nightly.  ergocalciferol (VITAMIN D2) 50,000 unit capsule Take 50,000 Units by mouth.  ASPIRIN PO Take  by mouth.          Past History     Past Medical History:  Past Medical History:   Diagnosis Date  COPD (chronic obstructive pulmonary disease) (HCC)     Essential hypertension     Family history of skin cancer     Osteoporosis     Tobacco abuse        Past Surgical History:  Past Surgical History:   Procedure Laterality Date    HX MOHS PROCEDURES  02/28/2018    BCC L lateral forehead by Dr. Ambar Boyce        Family History:  Family History   Problem Relation Age of Onset    Hypertension Mother        Social History:  Social History     Tobacco Use    Smoking status: Current Every Day Smoker     Packs/day: 1.00     Years: 60.00     Pack years: 60.00     Start date: 3/4/1961    Smokeless tobacco: Never Used   Substance Use Topics    Alcohol use: Not on file    Drug use: Not on file       Allergies:  No Known Allergies      Review of Systems   Unable obtain a complete review of systems due to patient's respiratory distress  Review of Systems    Physical Exam   Physical Exam  Constitutional:       General: Moderate acute distress. Appearance: Normal appearance. Not toxic-appearing. HENT:      Head: Normocephalic and atraumatic. Nose: Nose normal.      Mouth/Throat:      Mouth: Mucous membranes are moist.   Eyes:      Extraocular Movements: Extraocular movements intact. Pupils: Pupils are equal, round, and reactive to light. Cardiovascular:      Rate and Rhythm: Normal rate. Pulses: Normal pulses. Pulmonary:      Effort: Pulmonary effort is tachypneic. Breath sounds: No stridor. Abdominal:      General: Abdomen is flat. There is no distension. Musculoskeletal:         General: Normal range of motion. Cervical back: Normal range of motion and neck supple. Skin:     General: Skin is warm and dry. Capillary Refill: Capillary refill takes less than 2 seconds. Neurological:      General: No focal deficit present. Mental Status: Aert and oriented to person, place, and time.    Psychiatric:         Mood and Affect: Mood normal.         Behavior: Behavior normal.       Physical Exam    Lab and Diagnostic Study Results     Labs -     Recent Results (from the past 12 hour(s))   COVID-19 RAPID TEST    Collection Time: 12/08/21  1:54 AM   Result Value Ref Range    Specimen source Please find results under separate order      COVID-19 rapid test DETECTED (A) Not Detected     CBC WITH AUTOMATED DIFF    Collection Time: 12/08/21  2:05 AM   Result Value Ref Range    WBC 11.6 (H) 3.6 - 11.0 K/uL    RBC 3.64 (L) 3.80 - 5.20 M/uL    HGB 11.1 (L) 11.5 - 16.0 g/dL    HCT 33.7 (L) 35.0 - 47.0 %    MCV 92.6 80.0 - 99.0 FL    MCH 30.5 26.0 - 34.0 PG    MCHC 32.9 30.0 - 36.5 g/dL    RDW 16.7 (H) 11.5 - 14.5 %    PLATELET 366 470 - 136 K/uL    MPV 10.2 8.9 - 12.9 FL    NRBC 0.0 0.0  WBC    ABSOLUTE NRBC 0.00 0.00 - 0.01 K/uL    NEUTROPHILS 75 32 - 75 %    LYMPHOCYTES 15 12 - 49 %    MONOCYTES 10 5 - 13 %    EOSINOPHILS 0 0 - 7 %    BASOPHILS 0 0 - 1 %    IMMATURE GRANULOCYTES 0 0 - 0.5 %    ABS. NEUTROPHILS 8.8 (H) 1.8 - 8.0 K/UL    ABS. LYMPHOCYTES 1.7 0.8 - 3.5 K/UL    ABS. MONOCYTES 1.1 (H) 0.0 - 1.0 K/UL    ABS. EOSINOPHILS 0.0 0.0 - 0.4 K/UL    ABS. BASOPHILS 0.0 0.0 - 0.1 K/UL    ABS. IMM. GRANS. 0.1 (H) 0.00 - 0.04 K/UL    DF AUTOMATED     METABOLIC PANEL, COMPREHENSIVE    Collection Time: 12/08/21  2:05 AM   Result Value Ref Range    Sodium 127 (L) 136 - 145 mmol/L    Potassium 4.3 3.5 - 5.1 mmol/L    Chloride 96 (L) 97 - 108 mmol/L    CO2 25 21 - 32 mmol/L    Anion gap 6 5 - 15 mmol/L    Glucose 172 (H) 65 - 100 mg/dL    BUN 21 (H) 6 - 20 mg/dL    Creatinine 0.50 (L) 0.55 - 1.02 mg/dL    BUN/Creatinine ratio 42 (H) 12 - 20      GFR est AA >60 >60 ml/min/1.73m2    GFR est non-AA >60 >60 ml/min/1.73m2    Calcium 8.0 (L) 8.5 - 10.1 mg/dL    Bilirubin, total 0.4 0.2 - 1.0 mg/dL    AST (SGOT) 51 (H) 15 - 37 U/L    ALT (SGPT) 39 12 - 78 U/L    Alk.  phosphatase 67 45 - 117 U/L    Protein, total 6.4 6.4 - 8.2 g/dL    Albumin 3.0 (L) 3.5 - 5.0 g/dL    Globulin 3.4 2.0 - 4.0 g/dL    A-G Ratio 0.9 (L) 1.1 - 2.2     LACTIC ACID    Collection Time: 12/08/21  2:05 AM   Result Value Ref Range    Lactic acid 1.4 0.4 - 2.0 mmol/L   TROPONIN-HIGH SENSITIVITY    Collection Time: 12/08/21  2:08 AM   Result Value Ref Range    Troponin-High Sensitivity 58 (HH) 0 - 51 ng/L       Radiologic Studies -   [unfilled]  CT Results  (Last 48 hours)               12/08/21 0342  CTA CHEST W OR W WO CONT Final result    Impression:      No pulmonary emboli. Patchy opacities in the lungs may be due to atelectasis, pneumonia or a   combination of these. Pulmonary emphysema. Atherosclerosis including coronary   arteries. Follow-up as clinically indicated. Please see full report. Narrative:  CTA chest with intravenous contrast, 80 cc Isovue-370, 3-D MIP images       Dose reduction: All CT scans at this facility are performed using dose reduction   optimization techniques as appropriate to a performed exam including the   following: Automated exposure control, adjustments of the mA and/or kV according   to patient size, or use of iterative reconstruction technique. INDICATION: Hypoxic       COMPARISON: 11/9/2021       FINDINGS:       No pulmonary emboli are identified. No aneurysm or dissection of thoracic aorta. Atherosclerosis including coronary arteries. Slightly prominent mediastinal and   hilar nodes may be reactive. No pleural or pericardial effusions. Partially   visualized left renal lesions are too small to characterize, may represent cysts   or other. Emphysematous changes patchy bilateral opacities in the lungs. Mild   peribronchial thickening. Areas of mild mucus plugging. No pneumothorax. No   acute fracture in the visualized bony structures. Degenerative changes   visualized upper lumbar spine. CXR Results  (Last 48 hours)               12/08/21 0205  XR CHEST PORT Final result    Impression:      Pulmonary emphysema.  Opacities in the lungs may represent atelectasis, pneumonia   or a combination of these. Follow-up as clinically indicated. Narrative:  Chest one view       INDICATION: Shortness of breath       COMPARISON: 11/16/2021       FINDINGS:       Heart size within normal limits. Emphysematous changes and bilateral opacities   in the lungs. No pleural effusions. No pneumothorax. No displaced fracture in   the visualized bony structures. Medical Decision Making and ED Course   - I am the first and primary provider for this patient AND AM THE PRIMARY PROVIDER OF RECORD. - I reviewed the vital signs, available nursing notes, past medical history, past surgical history, family history and social history. - Initial assessment performed. The patients presenting problems have been discussed, and the staff are in agreement with the care plan formulated and outlined with them. I have encouraged them to ask questions as they arise throughout their visit. Vital Signs-Reviewed the patient's vital signs. Patient Vitals for the past 12 hrs:   Temp Pulse Resp BP SpO2   12/08/21 0535  (!) 107 23 116/73 99 %   12/08/21 0531  (!) 103      12/08/21 0400  (!) 131 25 116/88 94 %   12/08/21 0346  (!) 131      12/08/21 0251 98.4 °F (36.9 °C)       12/08/21 0239  (!) 146 21 129/66 100 %   12/08/21 0146  (!) 142 27 118/62 100 %     The patient presents with shortness of breath with a differential diagnosis of COVID-19, pneumonia, pneumothorax, PE, dissection    ED Course:              Provider Notes (Medical Decision Making):   Patient stable on oxygen. Findings consistent with COVID-19 without other findings. Will admit at this time. Sepsis protocols deviated from due to likelihood of patient being Covid positive given her housemates with the same. Giving that the disease is a viral disease, antibiotic protocols deviated as well.   MDM           Consultations:       Consultations: -  Hospitalist Consultant: Dr. Almazan: We have asked for emergent assistance with regard to this patient. We have discussed the patients HPI, ROS, PE and results this far. They will come and evaluate the patient for admission. Procedures and Critical Care       Performed by: Elaina Mcintosh MD  PROCEDURES:  Procedures         CRITICAL CARE NOTE :  2:15 AM  Amount of Critical Care Time: 35(minutes)    IMPENDING DETERIORATION -Airway  ASSOCIATED RISK FACTORS - Hypoxia  MANAGEMENT- Bedside Assessment and Supervision of Care  INTERPRETATION -  CT Scan  INTERVENTIONS -   CASE REVIEW - Hospitalist/Intensivist  TREATMENT RESPONSE -Improved  PERFORMED BY - Self    NOTES   :  I have spent critical care time involved in lab review, consultations with specialist, family decision- making, bedside attention and documentation. This time excludes time spent in any separate billed procedures. During this entire length of time I was immediately available to the patient . Elaina Mcintosh MD        Disposition     Disposition: Admitted to Floor Medical Floor the case was discussed with the admitting physician     Admitted  Diagnosis     Clinical Impression:   1. Acute respiratory failure with hypoxia (HCC)    2. COVID-19        Attestations:    Elaina Mcintosh MD    Please note that this dictation was completed with Pure[employer], the computer voice recognition software. Quite often unanticipated grammatical, syntax, homophones, and other interpretive errors are inadvertently transcribed by the computer software. Please disregard these errors. Please excuse any errors that have escaped final proofreading. Thank you.

## 2021-12-09 NOTE — ED NOTES
.. TRANSFER - OUT REPORT:    Verbal report given to Paul on Sue Paige  being transferred to [place] [address] room [phone] for routine progression of care       Report consisted of patients Situation, Background, Assessment and   Recommendations(SBAR). Information from the following report(s) SBAR was reviewed with the receiving nurse. Lines:   Peripheral IV 12/08/21 Anterior; Distal; Left Forearm (Active)       Peripheral IV 12/08/21 Anterior; Right Forearm (Active)        Opportunity for questions and clarification was provided.       Patient transported with:   Monitor

## 2021-12-09 NOTE — PROGRESS NOTES
CM reviewed chart and spoke with primary physician. Patient is being actively treated for COVID-19 and is on oxygen at this time. Patient does not normally wear oxygen at home. [facility] [address] Patient has been accepted back with both companies. Updated clinical notes have been sent to companies and CM will continue to follow.

## 2021-12-09 NOTE — PROGRESS NOTES
Hospitalist Progress Note               Daily Progress Note: 12/9/2021      Subjective: The patient is seen for follow up. She is a 42-year-old female with history of COPD and hypertension, brought to the ED overnight with shortness of breath and generalized weakness. She has had cough as well. Arrival she had oxygen saturations in the 80s on room air. She tested positive for COVID-19. Multiple family members are also positive. CTA was negative for PE. She was also found to have rapid atrial fibrillation which is new for her    Patient is unvaccinated for Covid. She had been sick for 2 or 3 days    Patient was admitted, started on Remdesivir and steroids along with baricitinib  -------    Patient seen today for follow-up. Patient continues on 3 L nasal cannula.     Ferritin is 491, CRP 1.51,     Problem List:  Problem List as of 12/9/2021 Date Reviewed: 11/12/2021          Codes Class Noted - Resolved    New onset atrial fibrillation ([facility] ICD-10-CM: I48.91  ICD-9-CM: 427.31  12/8/2021 - Present        Atrial fibrillation with RVR ([facility] ICD-10-CM: I48.91  ICD-9-CM: 427.31  12/8/2021 - Present        Pneumonia due to COVID-19 virus ICD-10-CM: U07.1, J12.82  ICD-9-CM: 480.8, 079.89  12/8/2021 - Present        Dysphagia ICD-10-CM: R13.10  ICD-9-CM: 787.20  11/10/2021 - Present        Failure to thrive (child) ICD-10-CM: R62.51  ICD-9-CM: 783.41  11/10/2021 - Present        Severe protein-calorie malnutrition ([facility] ICD-10-CM: E43  ICD-9-CM: 262  11/10/2021 - Present        Unintentional weight loss ICD-10-CM: R63.4  ICD-9-CM: 783.21  11/10/2021 - Present        COPD with acute exacerbation ([facility] ICD-10-CM: J44.1  ICD-9-CM: 491.21  11/9/2021 - Present        Acute respiratory failure with hypoxia ([facility] ICD-10-CM: J96.01  ICD-9-CM: 518.81  11/9/2021 - Present        Hypertension ICD-10-CM: I10  ICD-9-CM: 401.9  11/9/2021 - Present        Tobacco abuse ICD-10-CM: Z72.0  ICD-9-CM: 305.1  11/9/2021 - Present        Osteoporosis ICD-10-CM: M81.0  ICD-9-CM: 733.00  2021 - Present        Community acquired pneumonia ICD-10-CM: J18.9  ICD-9-CM: 710  2021 - Present        Bronchiectasis ([address] ICD-10-CM: J47.9  ICD-9-CM: 494.0  2021 - Present              Medications reviewed  Current Facility-Administered Medications   Medication Dose Route Frequency    metoprolol tartrate (LOPRESSOR) tablet 50 mg  50 mg Oral BID    sodium chloride (NS) flush 5-40 mL  5-40 mL IntraVENous Q8H    sodium chloride (NS) flush 5-40 mL  5-40 mL IntraVENous PRN    acetaminophen (TYLENOL) tablet 650 mg  650 mg Oral Q6H PRN    Or    acetaminophen (TYLENOL) suppository 650 mg  650 mg Rectal Q6H PRN    polyethylene glycol (MIRALAX) packet 17 g  17 g Oral DAILY PRN    ondansetron (ZOFRAN ODT) tablet 4 mg  4 mg Oral Q8H PRN    Or    ondansetron (ZOFRAN) injection 4 mg  4 mg IntraVENous Q6H PRN    doxycycline (VIBRAMYCIN) 100 mg in 0.9% sodium chloride (MBP/ADV) 100 mL MBP  100 mg IntraVENous Q12H    remdesivir 100 mg in 0.9% sodium chloride 250 mL IVPB  100 mg IntraVENous Q24H    dexamethasone (PF) (DECADRON) 10 mg/mL injection 6 mg  6 mg IntraVENous Q12H    baricitinib (OLUMIANT) tablet 4 mg  4 mg Oral DAILY    dilTIAZem ER (CARDIZEM CD) capsule 120 mg  120 mg Oral DAILY    enoxaparin (LOVENOX) injection 40 mg  1 mg/kg SubCUTAneous Q12H       Review of Systems:   A comprehensive review of systems was negative except for that written in the HPI. Objective:   Physical Exam:     Visit Vitals  BP (!) 151/68   Pulse 89   Temp 98.2 °F (36.8 °C)   Resp 18   Ht 5' 5\" (1.651 m)   Wt 38.6 kg (85 lb)   SpO2 93%   Breastfeeding No   BMI 14.14 kg/m²    O2 Flow Rate (L/min): 3 l/min O2 Device: Nasal cannula    Temp (24hrs), Av.9 °F (36.6 °C), Min:97.6 °F (36.4 °C), Max:98.2 °F (36.8 °C)    No intake/output data recorded.     1901 -  0700  In: 613.2 [I.V.:613.2]  Out: 800 [Urine:800]    General:   Awake and alert Lungs: Few rhonchi bilateral   Chest wall:  No tenderness or deformity. Heart:  Regular rate and rhythm, S1, S2 normal, no murmur, click, rub or gallop. Abdomen:   Soft, non-tender. Bowel sounds normal. No masses,  No organomegaly. Extremities: Extremities normal, atraumatic, no cyanosis or edema. Pulses: 2+ and symmetric all extremities. Skin: Skin color, texture, turgor normal. No rashes or lesions   Neurologic: CNII-XII intact. No gross focal deficits         Data Review:       Recent Days:  Recent Labs     12/09/21  0815 12/08/21  0205   WBC 4.3 11.6*   HGB 12.8 11.1*   HCT 38.8 33.7*   * 311     Recent Labs     12/09/21  0815 12/08/21  0205   * 127*   K 4.6 4.3    96*   CO2 24 25   * 172*   BUN 22* 21*   CREA 0.58 0.50*   CA 9.3 8.0*   ALB 3.1* 3.0*   TBILI 0.4 0.4   ALT 40 39     No results for input(s): PH, PCO2, PO2, HCO3, FIO2 in the last 72 hours.     24 Hour Results:  Recent Results (from the past 24 hour(s))   PROCALCITONIN    Collection Time: 12/08/21 11:57 AM   Result Value Ref Range    Procalcitonin 0.27 (H) 0 ng/mL   FERRITIN    Collection Time: 12/08/21 11:57 AM   Result Value Ref Range    Ferritin 110 8 - 252 ng/mL   D DIMER    Collection Time: 12/08/21 11:57 AM   Result Value Ref Range    D DIMER 1.12 (H) <0.50 ug/ml(FEU)   C REACTIVE PROTEIN, QT    Collection Time: 12/09/21  8:13 AM   Result Value Ref Range    C-Reactive protein 1.51 (H) 0.00 - 0.60 mg/dL   LD    Collection Time: 12/09/21  8:13 AM   Result Value Ref Range     81 - 246 U/L   CBC WITH AUTOMATED DIFF    Collection Time: 12/09/21  8:15 AM   Result Value Ref Range    WBC 4.3 3.6 - 11.0 K/uL    RBC 4.22 3.80 - 5.20 M/uL    HGB 12.8 11.5 - 16.0 g/dL    HCT 38.8 35.0 - 47.0 %    MCV 91.9 80.0 - 99.0 FL    MCH 30.3 26.0 - 34.0 PG    MCHC 33.0 30.0 - 36.5 g/dL    RDW 17.2 (H) 11.5 - 14.5 %    PLATELET 916 (H) 613 - 400 K/uL    MPV 10.4 8.9 - 12.9 FL    NRBC 0.0 0.0  WBC    ABSOLUTE NRBC 0. 00 0.00 - 0.01 K/uL    NEUTROPHILS PENDING %    LYMPHOCYTES PENDING %    MONOCYTES PENDING %    EOSINOPHILS PENDING %    BASOPHILS PENDING %    IMMATURE GRANULOCYTES PENDING %    ABS. NEUTROPHILS PENDING K/UL    ABS. LYMPHOCYTES PENDING K/UL    ABS. MONOCYTES PENDING K/UL    ABS. EOSINOPHILS PENDING K/UL    ABS. BASOPHILS PENDING K/UL    ABS. IMM. GRANS. PENDING K/UL    DF PENDING    METABOLIC PANEL, COMPREHENSIVE    Collection Time: 12/09/21  8:15 AM   Result Value Ref Range    Sodium 134 (L) 136 - 145 mmol/L    Potassium 4.6 3.5 - 5.1 mmol/L    Chloride 104 97 - 108 mmol/L    CO2 24 21 - 32 mmol/L    Anion gap 6 5 - 15 mmol/L    Glucose 121 (H) 65 - 100 mg/dL    BUN 22 (H) 6 - 20 mg/dL    Creatinine 0.58 0.55 - 1.02 mg/dL    BUN/Creatinine ratio 38 (H) 12 - 20      GFR est AA >60 >60 ml/min/1.73m2    GFR est non-AA >60 >60 ml/min/1.73m2    Calcium 9.3 8.5 - 10.1 mg/dL    Bilirubin, total 0.4 0.2 - 1.0 mg/dL    AST (SGOT) 31 15 - 37 U/L    ALT (SGPT) 40 12 - 78 U/L    Alk. phosphatase 72 45 - 117 U/L    Protein, total 6.8 6.4 - 8.2 g/dL    Albumin 3.1 (L) 3.5 - 5.0 g/dL    Globulin 3.7 2.0 - 4.0 g/dL    A-G Ratio 0.8 (L) 1.1 - 2.2     D DIMER    Collection Time: 12/09/21  8:15 AM   Result Value Ref Range    D DIMER 0.97 (H) <0.50 ug/ml(FEU)   TROPONIN-HIGH SENSITIVITY    Collection Time: 12/09/21  8:15 AM   Result Value Ref Range    Troponin-High Sensitivity 491 (HH) 0 - 51 ng/L   GLUCOSE, POC    Collection Time: 12/09/21  8:24 AM   Result Value Ref Range    Glucose (POC) 121 (H) 65 - 117 mg/dL    Performed by Uriah Handley        CTA CHEST W OR W WO CONT   Final Result      No pulmonary emboli. Patchy opacities in the lungs may be due to atelectasis, pneumonia or a   combination of these. Pulmonary emphysema. Atherosclerosis including coronary   arteries. Follow-up as clinically indicated. Please see full report. XR CHEST PORT   Final Result      Pulmonary emphysema.  Opacities in the lungs may represent atelectasis, pneumonia   or a combination of these. Follow-up as clinically indicated. Assessment:  COVID-19 with pneumonitis, non-severe disease    Acute respiratory failure with hypoxia    COPD with exacerbation    Atrial fibrillation with RVR, now back in sinus rhythm    Dehydration with hyponatremia, improved    Hypertension    Chronic PEG tube feedings due to history of dysphagia    Plan:  Continue Remdesivir, start dexamethasone, doxycycline and baricitinib  Continue Jevity tube feeds  Add Delsym as needed    Care Plan discussed with: Patient/Family    Disposition: Continued inpatient care    Total time spent with patient: 30 minutes.     Ayad Alan MD Ear Star Wedge Flap Text: The defect edges were debeveled with a #15 blade scalpel.  Given the location of the defect and the proximity to free margins (helical rim) an ear star wedge flap was deemed most appropriate.  Using a sterile surgical marker, the appropriate flap was drawn incorporating the defect and placing the expected incisions between the helical rim and antihelix where possible.  The area thus outlined was incised through and through with a #15 scalpel blade.

## 2021-12-09 NOTE — ROUTINE PROCESS
Admission skin assessment done by JASSI Chou and LAZARO Loomis. The patient does not have any noted skin breakdown,.

## 2021-12-09 NOTE — PROGRESS NOTES
Progress Note      12/9/2021 6:01 PM  NAME: Chayo Campoverde   MRN:  [id number]   Admit Diagnosis: Pneumonia due to COVID-19 virus [U07.1, J12.82]  Atrial fibrillation with RVR ([address] [I48.91]  New onset atrial fibrillation ([address] [I48.91]      Problem List:     1. Paroxysmal atrial fibrillation  2. COVID-19 positive test (U07.1, COVID-19) with Acute Pneumonia (J12.89, Other viral pneumonia)  3. COPD  4. Failure to thrive, chronic     Assessment/Plan:     1. Patient's A. fib with RVR now with IV Cardizem has converted to sinus rhythm without further recurrence of A. Fib. Patient's echocardiogram today shows well-preserved LV systolic function with mild mitral regurgitation. Continue long-acting diltiazem and metoprolol with anticoagulation by Lovenox. Patient Lovenox can be changed to Eliquis on discharge. 2.  Covid pneumonia, patient on doxycycline, remdesivir and steroids as per medical team.  3.   Hypertension, add lisinopril for blood pressure control. []       High complexity decision making was performed in this patient at high risk for decompensation with multiple organ involvement. Subjective:     Chayo Campoverde denies chest pain, dyspnea. Discussed with RN events overnight. Review of Systems:    ROS     Objective:      Physical Exam:    Last 24hrs VS reviewed since prior progress note. Most recent are:    Visit Vitals  BP (!) 150/71   Pulse 91   Temp 98.1 °F (36.7 °C)   Resp 18   Ht 5' 5\" (1.651 m)   Wt 38.6 kg (85 lb)   SpO2 93%   Breastfeeding No   BMI 14.14 kg/m²       Intake/Output Summary (Last 24 hours) at 12/9/2021 1801  Last data filed at 12/9/2021 1730  Gross per 24 hour   Intake 125 ml   Output 500 ml   Net -375 ml        General Appearance: Cachectic appearing elderly female, alert & oriented x 3,    no acute distress. Ears/Nose/Mouth/Throat: Hearing grossly normal.  Neck: Supple. Chest: Lungs rhonchi bilaterally.   Cardiovascular: Regular rate and rhythm, S1S2 normal, no murmur. Abdomen: Soft, non-tender, bowel sounds are active. Extremities: No edema bilaterally. Skin: Warm and dry. []         Post-cath site without hematoma, bruit, tenderness, or thrill. Distal pulses intact. PMH/SH reviewed - no change compared to H&P    Data Review    Telemetry: normal sinus rhythm     EKG:   []  No new EKG for review    CTA CHEST W OR W WO CONT   Final Result      No pulmonary emboli. Patchy opacities in the lungs may be due to atelectasis, pneumonia or a   combination of these. Pulmonary emphysema. Atherosclerosis including coronary   arteries. Follow-up as clinically indicated. Please see full report. XR CHEST PORT   Final Result      Pulmonary emphysema. Opacities in the lungs may represent atelectasis, pneumonia   or a combination of these. Follow-up as clinically indicated. Recent Results (from the past 24 hour(s))   C REACTIVE PROTEIN, QT    Collection Time: 12/09/21  8:13 AM   Result Value Ref Range    C-Reactive protein 1.51 (H) 0.00 - 0.60 mg/dL   LD    Collection Time: 12/09/21  8:13 AM   Result Value Ref Range     81 - 246 U/L   CBC WITH AUTOMATED DIFF    Collection Time: 12/09/21  8:15 AM   Result Value Ref Range    WBC 4.3 3.6 - 11.0 K/uL    RBC 4.22 3.80 - 5.20 M/uL    HGB 12.8 11.5 - 16.0 g/dL    HCT 38.8 35.0 - 47.0 %    MCV 91.9 80.0 - 99.0 FL    MCH 30.3 26.0 - 34.0 PG    MCHC 33.0 30.0 - 36.5 g/dL    RDW 17.2 (H) 11.5 - 14.5 %    PLATELET 559 (H) 733 - 400 K/uL    MPV 10.4 8.9 - 12.9 FL    NRBC 0.0 0.0  WBC    ABSOLUTE NRBC 0.00 0.00 - 0.01 K/uL    NEUTROPHILS 69 32 - 75 %    LYMPHOCYTES 19 12 - 49 %    MONOCYTES 12 5 - 13 %    EOSINOPHILS 0 0 - 7 %    BASOPHILS 0 0 - 1 %    IMMATURE GRANULOCYTES 0 %    ABS. NEUTROPHILS 3.0 1.8 - 8.0 K/UL    ABS. LYMPHOCYTES 0.8 0.8 - 3.5 K/UL    ABS. MONOCYTES 0.5 0.0 - 1.0 K/UL    ABS. EOSINOPHILS 0.0 0.0 - 0.4 K/UL    ABS. BASOPHILS 0.0 0.0 - 0.1 K/UL    ABS. IMM.  GRANS. 0.0 K/UL    DF Smear Scanned      RBC COMMENTS Anisocytosis  1+       METABOLIC PANEL, COMPREHENSIVE    Collection Time: 12/09/21  8:15 AM   Result Value Ref Range    Sodium 134 (L) 136 - 145 mmol/L    Potassium 4.6 3.5 - 5.1 mmol/L    Chloride 104 97 - 108 mmol/L    CO2 24 21 - 32 mmol/L    Anion gap 6 5 - 15 mmol/L    Glucose 121 (H) 65 - 100 mg/dL    BUN 22 (H) 6 - 20 mg/dL    Creatinine 0.58 0.55 - 1.02 mg/dL    BUN/Creatinine ratio 38 (H) 12 - 20      GFR est AA >60 >60 ml/min/1.73m2    GFR est non-AA >60 >60 ml/min/1.73m2    Calcium 9.3 8.5 - 10.1 mg/dL    Bilirubin, total 0.4 0.2 - 1.0 mg/dL    AST (SGOT) 31 15 - 37 U/L    ALT (SGPT) 40 12 - 78 U/L    Alk.  phosphatase 72 45 - 117 U/L    Protein, total 6.8 6.4 - 8.2 g/dL    Albumin 3.1 (L) 3.5 - 5.0 g/dL    Globulin 3.7 2.0 - 4.0 g/dL    A-G Ratio 0.8 (L) 1.1 - 2.2     D DIMER    Collection Time: 12/09/21  8:15 AM   Result Value Ref Range    D DIMER 0.97 (H) <0.50 ug/ml(FEU)   TROPONIN-HIGH SENSITIVITY    Collection Time: 12/09/21  8:15 AM   Result Value Ref Range    Troponin-High Sensitivity 491 (HH) 0 - 51 ng/L   GLUCOSE, POC    Collection Time: 12/09/21  8:24 AM   Result Value Ref Range    Glucose (POC) 121 (H) 65 - 117 mg/dL    Performed by Bhavana Castañeda    ECHO ADULT COMPLETE    Collection Time: 12/09/21 10:30 AM   Result Value Ref Range    LV ED Vol A2C 17.00 cm3    LV ED Vol A4C 31.00 cm3    LV ES Vol A4C 15.00 cm3    LV ES Vol A2C 14.00 cm3    IVSd 1.11 (A) 0.6 - 0.9 cm    LVIDd 3.83 (A) 3.9 - 5.3 cm    LVIDs 2.41 cm    LVOT d 1.80 cm    Left Ventricular Outflow Tract Mean Gradient 3.00 mmHg    LVOT VTI 23.20 cm    LVOT Peak Velocity 119.00 cm/s    LVOT Peak Gradient 6.00 mmHg    LVPWd 1.08 (A) 0.6 - 0.9 cm    LV E' Septal Velocity 4.63 cm/s    E/E' septal 16.41     LVOT SV 59.0 cm3    Left Atrium Minor Axis 2.70 cm    Left Atrium Major Axis 3.73 cm    LA Area 2C 6.82 cm2    LA Area 4C 9.40 cm2    LA Volume DISK BP 19.00 22 - 52 cm3    Aortic Regurgitant Pressure Half-time 200.00 ms    AR Max Cameron 86.30 cm/s    Aortic Valve Systolic Peak Velocity 945.10 cm/s    AoV PG 6.00 mmHg    Aortic Valve Systolic Mean Gradient 1.77 mmHg    AoV VTI 23.90 cm    Aortic valve mean velocity 68.90 cm/s    Aortic Valve Area by Continuity of VTI 2.47 cm2    Aortic Valve Area by Continuity of Peak Velocity 2.38 cm2    Ao Root D 3.20 cm    AO ASC D 2.80 cm    TV MG 80.00 mmHg    Mitral Regurgitant Velocity Time Integral 180.00 cm    MV Mean Gradient 2.00 mmHg    Mitral Valve Annulus Velocity Time Integral 30.60 cm    Mitral Valve Max Velocity 132.00 cm/s    MV Peak Gradient 7.00 mmHg    Mitral Valve Deceleration [place] 3,310.00 mm/s2    Mitral Valve Deceleration [place] 3,310.00 mm/s2    Mitral Valve Pressure Half-time 67.00 ms    MV A Cameron 123.00 cm/s    MV E Cameron 76.00 cm/s    MVA (PHT) 3.28 cm2    MV E/A 0.62     Pulmonic Valve Systolic Mean Gradient 7.00 mmHg    Pulmonic Valve Systolic Velocity Time Integral 18.70 cm    Pulmonic Valve Max Velocity 88.10 cm/s    Pulmonic Valve Systolic Peak Instantaneous Gradient 3.00 mmHg    Pulm Vein Peak D Velocity 46.40 cm/s    Pulm Vein Peak S Velocity 93.30 cm/s    Tricuspid Valve Max Velocity 85.10 cm/s    Triscuspid Valve Regurgitation Peak Gradient 3.00 mmHg    Tapse 1.50 1.5 - 2.0 cm    Right Atrial Area 4C 8.40 cm2    LV Mass .4 67 - 162 g    LV Mass AL Index 98.1 43 - 95 g/m2    RVSP 6.0 mmHg    Est. RA Pressure 3.0 mmHg    MING/BSA Pk Cameron 1.7 cm2/m2    MING/BSA VTI 1.8 cm2/m2   GLUCOSE, POC    Collection Time: 12/09/21 12:05 PM   Result Value Ref Range    Glucose (POC) 231 (H) 65 - 117 mg/dL    Performed by Ronda Sal           Echo:   12/08/21    ECHO ADULT COMPLETE 12/09/2021 12/9/2021    Interpretation Summary  · LV: Estimated LVEF is 55 - 60%. Normal cavity size, wall thickness and systolic function (ejection fraction normal). Mild (grade 1) left ventricular diastolic dysfunction. · MV: Mitral valve thickening.  Mild mitral valve regurgitation is present. · No critical valve disease seen. · Normal IVC size indicates normal right atrial filling pressures. Could not estimate pulmonary pressures on this study. Signed by: Olga Moreno MD on 12/9/2021  5:48 PM      Patient's  EKG, laboratory data and echocardiogram were individually reviewed by me. Lab Data:    Recent Labs     12/09/21  0815 12/08/21  0205   WBC 4.3 11.6*   HGB 12.8 11.1*   HCT 38.8 33.7*   * 311     No results for input(s): INR, PTP, APTT, INREXT in the last 72 hours. Recent Labs     12/09/21  0815 12/08/21  0205   * 127*   K 4.6 4.3    96*   CO2 24 25   BUN 22* 21*   CREA 0.58 0.50*   * 172*   CA 9.3 8.0*     No results for input(s): CPK, CKNDX, TROIQ in the last 72 hours. No lab exists for component: CPKMB  No results found for: CHOL, CHOLX, CHLST, CHOLV, HDL, HDLP, LDL, LDLC, DLDLP, TGLX, TRIGL, TRIGP, CHHD, CHHDX    Recent Labs     12/09/21  0815 12/08/21  0205   AP 72 67   TP 6.8 6.4   ALB 3.1* 3.0*   GLOB 3.7 3.4     No results for input(s): PH, PCO2, PO2 in the last 72 hours.     Medications Personally Reviewed:    Current Facility-Administered Medications   Medication Dose Route Frequency    dextromethorphan (DELSYM) 30 mg/5 mL syrup 30 mg  30 mg Per G Tube Q12H    metoprolol tartrate (LOPRESSOR) tablet 50 mg  50 mg Oral BID    sodium chloride (NS) flush 5-40 mL  5-40 mL IntraVENous Q8H    sodium chloride (NS) flush 5-40 mL  5-40 mL IntraVENous PRN    acetaminophen (TYLENOL) tablet 650 mg  650 mg Oral Q6H PRN    Or    acetaminophen (TYLENOL) suppository 650 mg  650 mg Rectal Q6H PRN    polyethylene glycol (MIRALAX) packet 17 g  17 g Oral DAILY PRN    ondansetron (ZOFRAN ODT) tablet 4 mg  4 mg Oral Q8H PRN    Or    ondansetron (ZOFRAN) injection 4 mg  4 mg IntraVENous Q6H PRN    doxycycline (VIBRAMYCIN) 100 mg in 0.9% sodium chloride (MBP/ADV) 100 mL MBP  100 mg IntraVENous Q12H    remdesivir 100 mg in 0.9% sodium chloride 250 mL IVPB  100 mg IntraVENous Q24H    dexamethasone (PF) (DECADRON) 10 mg/mL injection 6 mg  6 mg IntraVENous Q12H    baricitinib (OLUMIANT) tablet 4 mg  4 mg Oral DAILY    dilTIAZem ER (CARDIZEM CD) capsule 120 mg  120 mg Oral DAILY    enoxaparin (LOVENOX) injection 40 mg  1 mg/kg SubCUTAneous Q12H         Prior to Admission medications    Medication Sig Start Date End Date Taking? Authorizing Provider   budesonide-formoteroL (SYMBICORT) 160-4.5 mcg/actuation HFAA Take 2 Puffs by inhalation two (2) times a day. 11/16/21   Kaitlin Ortiz PA-C   multivitamin with folic acid (ONE DAILY WITH FOLIC ACID) 753 mcg tab tablet Take 1 Tablet by mouth daily for 30 days. 11/16/21 12/16/21  Kaitlin Ortiz PA-C   albuterol (PROVENTIL HFA, VENTOLIN HFA, PROAIR HFA) 90 mcg/actuation inhaler Take 2 Puffs by inhalation every six (6) hours as needed for Wheezing or Shortness of Breath for up to 30 days. 11/16/21 12/16/21  Kaitlin Ortiz PA-C   famotidine (Pepcid) 40 mg tablet Take 1 Tablet by mouth daily for 30 days. 11/16/21 12/16/21  Kaitlin Ortiz PA-C   ibandronate (BONIVA) 150 mg tablet  2/12/18   Provider, Historical   metoprolol tartrate (LOPRESSOR) 50 mg tablet Take  by mouth two (2) times a day. Provider, Historical   amLODIPine (NORVASC) 5 mg tablet Take 5 mg by mouth daily. Provider, Historical   lovastatin (MEVACOR) 10 mg tablet Take  by mouth nightly. Provider, Historical   ergocalciferol (VITAMIN D2) 50,000 unit capsule Take 50,000 Units by mouth. Provider, Historical   ASPIRIN PO Take  by mouth.     Provider, Richar Telles MD

## 2021-12-09 NOTE — PROGRESS NOTES
Notified Mabel Pichardo of of high troponin level of 491, results called from [place] in lab. 0 new orders obtained

## 2021-12-09 NOTE — CONSULTS
Comprehensive Nutrition Assessment    Type and Reason for Visit: Consult (TF recs)    Nutrition Recommendations/Plan:   Initiate Jevity 1.5 via PEG at 100ml, bolus  As tolerated advance to goal of 235ml, 4x daily  Flush with 125ml q6h or per MD     Order provides 1410kcal (101%), 60g pro (103%), and 1215ml (101%)    If pt would like PO for pleasure, rec'd SLP consult    Document TF formula, TF rate, flushes, GRV's, and BM in I/O's    Add thiamin and folic acid to prevent refeeding syndrome    Nutrition Assessment:  COVID +. On 3L NC. On last admit, 11/14, pt received PEG tube and tolerated bolus feeds, with Jevity 1.5, prior to d/c. Pt now readmitted, NPO x2. RD to order TF to meet nutrition needs. Labs: Glu , Na 134, BUN 22. Meds: dexamethasone, enoxaparin, lopressor, IVF     Malnutrition Assessment:  Malnutrition Status:  Severe malnutrition    Context:  Chronic illness     Findings of the 6 clinical characteristics of malnutrition:   Energy Intake:  No significant decrease in energy intake  Weight Loss:  No significant weight loss     Body Fat Loss:  7 - Severe body fat loss, Orbital, Triceps   Muscle Mass Loss:  7 - Severe muscle mass loss, Calf (gastrocnemius), Clavicles (pectoralis &deltoids), Hand (interosseous), Temples (temporalis)  Fluid Accumulation:  No significant fluid accumulation,      Estimated Daily Nutrient Needs:  Energy (kcal): 1400kcal (35kcal/kg); Weight Used for Energy Requirements: Current  Protein (g): 58g (1.5g/kg); Weight Used for Protein Requirements: Current  Fluid (ml/day): 1200ml (30ml/kg); Method Used for Fluid Requirements: ml/kg    Nutrition Related Findings:  NFPE not performed 2/2 COVID precautions. NFPE from 11/14 showing severe wasting. C/s issues, so PEG to abd. No BM since admit. No edema.      Wounds:   None       Current Nutrition Therapies:  No diet orders on file    Anthropometric Measures:  · Height:  5' 5\" (165.1 cm)  · Current Body Wt:  38.6 kg (85 lb)   · Usual Body Wt:  39.9 kg (88 lb) (4 months ago)     · Ideal Body Wt:  125 lbs:  68 %   · BMI Category:  Underweight (BMI less than 22) age over 72       Nutrition Diagnosis:   · Increased nutrient needs related to increased demand for energy/nutrients as evidenced by nutrition support-enteral nutrition, BMI, severe muscle loss, severe loss of subcutaneous fat    Nutrition Interventions:   Food and/or Nutrient Delivery: Continue NPO, Start tube feeding  Nutrition Education and Counseling: Education not indicated  Coordination of Nutrition Care: Continue to monitor while inpatient    Goals:  Pt to meet >65% of EEN within 3 days. Wt gain 0.5kg per week.  Genaro jain       Nutrition Monitoring and Evaluation:   Behavioral-Environmental Outcomes: None identified  Food/Nutrient Intake Outcomes: Enteral nutrition intake/tolerance  Physical Signs/Symptoms Outcomes: Weight, Biochemical data, GI status    Discharge Planning:    Enteral nutrition     Electronically signed by Matilda Sacks, RD on 12/9/2021 at 10:28 AM    Contact: [phone]

## 2021-12-10 NOTE — PROGRESS NOTES
Problem: Airway Clearance - Ineffective  Goal: Achieve or maintain patent airway  Outcome: Progressing Towards Goal  Note: Airway assessment    Promote pulmonary hygiene    Encourage early ambulation when appropriate. Problem: Gas Exchange - Impaired  Goal: Absence of hypoxia  Outcome: Progressing Towards Goal  Note: Respiratory assessment    Monitor oxygen saturation    Vital signs assessment  Goal: Promote optimal lung function  Outcome: Progressing Towards Goal  Note: Encourage patient to change positions and prone. Problem: Breathing Pattern - Ineffective  Goal: Ability to achieve and maintain a regular respiratory rate  Outcome: Progressing Towards Goal  Note: Use of effective breathing techniques          Problem: Body Temperature -  Risk of, Imbalanced  Goal: Ability to maintain a body temperature within defined limits  Outcome: Progressing Towards Goal  Goal: Complications related to the disease process, condition or treatment will be avoided or minimized  Outcome: Progressing Towards Goal     Problem: Isolation Precautions - Risk of Spread of Infection  Goal: Prevent transmission of infectious organism to others  Outcome: Progressing Towards Goal     Problem: Nutrition Deficits  Goal: Optimize nutrtional status  Outcome: Progressing Towards Goal     Problem: Risk for Fluid Volume Deficit  Goal: Maintain normal heart rhythm  Outcome: Progressing Towards Goal  Goal: Maintain absence of muscle cramping  Outcome: Progressing Towards Goal  Goal: Maintain normal serum potassium, sodium, calcium, phosphorus, and pH  Outcome: Progressing Towards Goal     Problem: Falls - Risk of  Goal: *Absence of Falls  Description: Document Leroy Fall Risk and appropriate interventions in the flowsheet.   Outcome: Progressing Towards Goal  Note: Fall Risk Interventions:  Mobility Interventions: Bed/chair exit alarm, Patient to call before getting OOB         Medication Interventions: Patient to call before getting OOB    Elimination Interventions: Bed/chair exit alarm, Call light in reach              Problem: Patient Education: Go to Patient Education Activity  Goal: Patient/Family Education  Outcome: Progressing Towards Goal

## 2021-12-10 NOTE — PROGRESS NOTES
Hospitalist Progress Note               Daily Progress Note: 12/10/2021      Subjective: The patient is seen for follow up. She is a 76-year-old female with history of COPD and hypertension, brought to the ED overnight with shortness of breath and generalized weakness. She has had cough as well. Arrival she had oxygen saturations in the 80s on room air. She tested positive for COVID-19. Multiple family members are also positive. CTA was negative for PE. She was also found to have rapid atrial fibrillation which is new for her    Patient is unvaccinated for Covid. She had been sick for 2 or 3 days    Patient was admitted, started on Remdesivir and steroids along with baricitinib  -------    Patient seen today for follow-up. Patient continues on 4 L nasal cannula. Spo2>90    Ferritin is 491, CRP 1.51,     SR 80's    Problem List:  Problem List as of 12/10/2021 Date Reviewed: 11/12/2021          Codes Class Noted - Resolved    New onset atrial fibrillation ([facility] ICD-10-CM: I48.91  ICD-9-CM: 427.31  12/8/2021 - Present        Atrial fibrillation with RVR ([facility] ICD-10-CM: I48.91  ICD-9-CM: 427.31  12/8/2021 - Present        Pneumonia due to COVID-19 virus ICD-10-CM: U07.1, J12.82  ICD-9-CM: 480.8, 079.89  12/8/2021 - Present        Dysphagia ICD-10-CM: R13.10  ICD-9-CM: 787.20  11/10/2021 - Present        Failure to thrive (child) ICD-10-CM: R62.51  ICD-9-CM: 783.41  11/10/2021 - Present        Severe protein-calorie malnutrition ([facility] ICD-10-CM: E43  ICD-9-CM: 262  11/10/2021 - Present        Unintentional weight loss ICD-10-CM: R63.4  ICD-9-CM: 783.21  11/10/2021 - Present        COPD with acute exacerbation ([facility] ICD-10-CM: J44.1  ICD-9-CM: 491.21  11/9/2021 - Present        Acute respiratory failure with hypoxia ([facility] ICD-10-CM: J96.01  ICD-9-CM: 518.81  11/9/2021 - Present        Hypertension ICD-10-CM: I10  ICD-9-CM: 401.9  11/9/2021 - Present        Tobacco abuse ICD-10-CM: Z72.0  ICD-9-CM: 305.1  11/9/2021 - Present        Osteoporosis ICD-10-CM: M81.0  ICD-9-CM: 733.00  11/9/2021 - Present        Community acquired pneumonia ICD-10-CM: J18.9  ICD-9-CM: 486  11/9/2021 - Present        Bronchiectasis ([facility] ICD-10-CM: J47.9  ICD-9-CM: 494.0  11/9/2021 - Present              Medications reviewed  Current Facility-Administered Medications   Medication Dose Route Frequency    dextromethorphan (DELSYM) 30 mg/5 mL syrup 30 mg  30 mg Per G Tube Q12H    lisinopriL (PRINIVIL, ZESTRIL) tablet 5 mg  5 mg Oral DAILY    metoprolol tartrate (LOPRESSOR) tablet 50 mg  50 mg Oral BID    sodium chloride (NS) flush 5-40 mL  5-40 mL IntraVENous Q8H    sodium chloride (NS) flush 5-40 mL  5-40 mL IntraVENous PRN    acetaminophen (TYLENOL) tablet 650 mg  650 mg Oral Q6H PRN    Or    acetaminophen (TYLENOL) suppository 650 mg  650 mg Rectal Q6H PRN    polyethylene glycol (MIRALAX) packet 17 g  17 g Oral DAILY PRN    ondansetron (ZOFRAN ODT) tablet 4 mg  4 mg Oral Q8H PRN    Or    ondansetron (ZOFRAN) injection 4 mg  4 mg IntraVENous Q6H PRN    doxycycline (VIBRAMYCIN) 100 mg in 0.9% sodium chloride (MBP/ADV) 100 mL MBP  100 mg IntraVENous Q12H    remdesivir 100 mg in 0.9% sodium chloride 250 mL IVPB  100 mg IntraVENous Q24H    dexamethasone (PF) (DECADRON) 10 mg/mL injection 6 mg  6 mg IntraVENous Q12H    baricitinib (OLUMIANT) tablet 4 mg  4 mg Oral DAILY    dilTIAZem ER (CARDIZEM CD) capsule 120 mg  120 mg Oral DAILY    enoxaparin (LOVENOX) injection 40 mg  1 mg/kg SubCUTAneous Q12H       Review of Systems:   A comprehensive review of systems was negative except for that written in the HPI.     Objective:   Physical Exam:     Visit Vitals  BP (!) 149/69 (BP 1 Location: Left upper arm, BP Patient Position: Sitting)   Pulse 87   Temp 97.9 °F (36.6 °C)   Resp 26   Ht 5' 5\" (1.651 m)   Wt 38.6 kg (85 lb)   SpO2 93%   Breastfeeding No   BMI 14.14 kg/m²    O2 Flow Rate (L/min): 4 l/min O2 Device: Nasal cannula    Temp (24hrs), Av.9 °F (36.6 °C), Min:97.8 °F (36.6 °C), Max:98.1 °F (36.7 °C)    12/10 0701 - 12/10 1900  In: 410   Out: -    1901 - 12/10 0700  In: 610   Out: 500 [Urine:500]    General:   Awake and alert   Lungs: Few rhonchi bilateral, not labored   Chest wall:  No tenderness or deformity. Heart:  Regular rate and rhythm, S1, S2 normal, no murmur, click, rub or gallop. Abdomen:   Soft, non-tender. Bowel sounds normal. No masses,  No organomegaly. Extremities: Extremities normal, atraumatic, no cyanosis or edema. Pulses: 2+ and symmetric all extremities. Skin: Skin color, texture, turgor normal. No rashes or lesions   Neurologic: CNII-XII intact. No gross focal deficits         Data Review:       Recent Days:  Recent Labs     12/10/21  0943 21  0815 21  0205   WBC 6.7 4.3 11.6*   HGB 11.7 12.8 11.1*   HCT 35.7 38.8 33.7*   * 404* 311     Recent Labs     12/10/21  0943 21  0815 21  0205   * 134* 127*   K 4.3 4.6 4.3    104 96*   CO2 24 24 25   * 121* 172*   BUN 33* 22* 21*   CREA 0.66 0.58 0.50*   CA 8.4* 9.3 8.0*   ALB  --  3.1* 3.0*   TBILI  --  0.4 0.4   ALT  --  40 39     No results for input(s): PH, PCO2, PO2, HCO3, FIO2 in the last 72 hours.     24 Hour Results:  Recent Results (from the past 24 hour(s))   GLUCOSE, POC    Collection Time: 21  8:21 PM   Result Value Ref Range    Glucose (POC) 231 (H) 65 - 117 mg/dL    Performed by [address],     Collection Time: 12/10/21  9:43 AM   Result Value Ref Range    C-Reactive protein 0.63 (H) 0.00 - 0.60 mg/dL   CBC WITH AUTOMATED DIFF    Collection Time: 12/10/21  9:43 AM   Result Value Ref Range    WBC 6.7 3.6 - 11.0 K/uL    RBC 3.84 3.80 - 5.20 M/uL    HGB 11.7 11.5 - 16.0 g/dL    HCT 35.7 35.0 - 47.0 %    MCV 93.0 80.0 - 99.0 FL    MCH 30.5 26.0 - 34.0 PG    MCHC 32.8 30.0 - 36.5 g/dL    RDW 17.2 (H) 11.5 - 14.5 %    PLATELET 603 (H) 206 - 400 K/uL    MPV 10.9 8.9 - 12.9 FL    NRBC 0.0 0.0  WBC    ABSOLUTE NRBC 0.00 0.00 - 0.01 K/uL    NEUTROPHILS 83 (H) 32 - 75 %    LYMPHOCYTES 8 (L) 12 - 49 %    MONOCYTES 9 5 - 13 %    EOSINOPHILS 0 0 - 7 %    BASOPHILS 0 0 - 1 %    IMMATURE GRANULOCYTES 0 0 - 0.5 %    ABS. NEUTROPHILS 5.6 1.8 - 8.0 K/UL    ABS. LYMPHOCYTES 0.5 (L) 0.8 - 3.5 K/UL    ABS. MONOCYTES 0.6 0.0 - 1.0 K/UL    ABS. EOSINOPHILS 0.0 0.0 - 0.4 K/UL    ABS. BASOPHILS 0.0 0.0 - 0.1 K/UL    ABS. IMM. GRANS. 0.0 0.00 - 0.04 K/UL    DF AUTOMATED     METABOLIC PANEL, BASIC    Collection Time: 12/10/21  9:43 AM   Result Value Ref Range    Sodium 134 (L) 136 - 145 mmol/L    Potassium 4.3 3.5 - 5.1 mmol/L    Chloride 102 97 - 108 mmol/L    CO2 24 21 - 32 mmol/L    Anion gap 8 5 - 15 mmol/L    Glucose 216 (H) 65 - 100 mg/dL    BUN 33 (H) 6 - 20 mg/dL    Creatinine 0.66 0.55 - 1.02 mg/dL    BUN/Creatinine ratio 50 (H) 12 - 20      GFR est AA >60 >60 ml/min/1.73m2    GFR est non-AA >60 >60 ml/min/1.73m2    Calcium 8.4 (L) 8.5 - 10.1 mg/dL   LD    Collection Time: 12/10/21  9:43 AM   Result Value Ref Range     81 - 246 U/L   PROCALCITONIN    Collection Time: 12/10/21  9:43 AM   Result Value Ref Range    Procalcitonin 0.16 (H) 0 ng/mL       CTA CHEST W OR W WO CONT   Final Result      No pulmonary emboli. Patchy opacities in the lungs may be due to atelectasis, pneumonia or a   combination of these. Pulmonary emphysema. Atherosclerosis including coronary   arteries. Follow-up as clinically indicated. Please see full report. XR CHEST PORT   Final Result      Pulmonary emphysema. Opacities in the lungs may represent atelectasis, pneumonia   or a combination of these. Follow-up as clinically indicated.            Assessment:  COVID-19 with pneumonitis, non-severe disease    Acute respiratory failure with hypoxia    COPD with exacerbation    Atrial fibrillation with RVR, now back in sinus rhythm    Dehydration with hyponatremia, improved    Hypertension    Chronic PEG tube feedings due to history of dysphagia    Plan:  Continue Remdesivir, start dexamethasone, doxycycline and baricitinib  Continue Jevity tube feeds  Add Delsym as needed  Wean o2 as tolerated    Care Plan discussed with: Patient/Family    Disposition: Continued inpatient care    Total time spent with patient: 25 minutes.     Iesha Duran MD

## 2021-12-10 NOTE — PROGRESS NOTES
CM reviewed chart. Patient not medically clear at this time. CM has updated [place] and [employer] with most current clinical notes and will continue to follow.

## 2021-12-11 NOTE — PROGRESS NOTES
Problem: Risk for Spread of Infection  Goal: Prevent transmission of infectious organism to others  Description: Prevent the transmission of infectious organisms to other patients, staff members, and visitors. Outcome: Progressing Towards Goal     Problem: Patient Education:  Go to Education Activity  Goal: Patient/Family Education  Outcome: Progressing Towards Goal     Problem: Airway Clearance - Ineffective  Goal: Achieve or maintain patent airway  Outcome: Progressing Towards Goal     Problem: Gas Exchange - Impaired  Goal: Absence of hypoxia  Outcome: Progressing Towards Goal  Goal: Promote optimal lung function  Outcome: Progressing Towards Goal     Problem: Breathing Pattern - Ineffective  Goal: Ability to achieve and maintain a regular respiratory rate  Outcome: Progressing Towards Goal     Problem: Isolation Precautions - Risk of Spread of Infection  Goal: Prevent transmission of infectious organism to others  Outcome: Progressing Towards Goal     Problem: Falls - Risk of  Goal: *Absence of Falls  Description: Document Leroy Fall Risk and appropriate interventions in the flowsheet.   Outcome: Progressing Towards Goal  Note: Fall Risk Interventions:  Mobility Interventions: Bed/chair exit alarm, Patient to call before getting OOB, Utilize walker, cane, or other assistive device, PT Consult for mobility concerns         Medication Interventions: Assess postural VS orthostatic hypotension, Bed/chair exit alarm, Patient to call before getting OOB, Teach patient to arise slowly    Elimination Interventions: Bed/chair exit alarm, Call light in reach              Problem: Patient Education: Go to Patient Education Activity  Goal: Patient/Family Education  Outcome: Progressing Towards Goal

## 2021-12-11 NOTE — PROGRESS NOTES
Hospitalist Progress Note               Daily Progress Note: 12/11/2021      Subjective: The patient is seen for follow up. She is a 46-year-old female with history of COPD and hypertension, brought to the ED overnight with shortness of breath and generalized weakness. She has had cough as well. Arrival she had oxygen saturations in the 80s on room air. She tested positive for COVID-19. Multiple family members are also positive. CTA was negative for PE. She was also found to have rapid atrial fibrillation which is new for her    Patient is unvaccinated for Covid. She had been sick for 2 or 3 days    Patient was admitted, started on Remdesivir and steroids along with baricitinib  -------    Patient seen today for follow-up. O2 requirement increased overnight and now on ventimask. cxr today reviewed by me shoow possibly worse airspace dz rt lower lung- aspirating. Follow rad read. She is alert and pleasant. She is tolerating peg feeds reportedly.     SR 70's        Problem List:  Problem List as of 12/11/2021 Date Reviewed: 11/12/2021          Codes Class Noted - Resolved    New onset atrial fibrillation ([facility] ICD-10-CM: I48.91  ICD-9-CM: 427.31  12/8/2021 - Present        Atrial fibrillation with RVR ([facility] ICD-10-CM: I48.91  ICD-9-CM: 427.31  12/8/2021 - Present        Pneumonia due to COVID-19 virus ICD-10-CM: U07.1, J12.82  ICD-9-CM: 480.8, 079.89  12/8/2021 - Present        Dysphagia ICD-10-CM: R13.10  ICD-9-CM: 787.20  11/10/2021 - Present        Failure to thrive (child) ICD-10-CM: R62.51  ICD-9-CM: 783.41  11/10/2021 - Present        Severe protein-calorie malnutrition ([facility] ICD-10-CM: E43  ICD-9-CM: 262  11/10/2021 - Present        Unintentional weight loss ICD-10-CM: R63.4  ICD-9-CM: 783.21  11/10/2021 - Present        COPD with acute exacerbation ([address] ICD-10-CM: J44.1  ICD-9-CM: 491.21  11/9/2021 - Present        Acute respiratory failure with hypoxia ([facility] ICD-10-CM: J96.01  ICD-9-CM: 518.81  11/9/2021 - Present        Hypertension ICD-10-CM: I10  ICD-9-CM: 401.9  11/9/2021 - Present        Tobacco abuse ICD-10-CM: Z72.0  ICD-9-CM: 305.1  11/9/2021 - Present        Osteoporosis ICD-10-CM: M81.0  ICD-9-CM: 733.00  11/9/2021 - Present        Community acquired pneumonia ICD-10-CM: J18.9  ICD-9-CM: 486  11/9/2021 - Present        Bronchiectasis ([facility] ICD-10-CM: J47.9  ICD-9-CM: 494.0  11/9/2021 - Present              Medications reviewed  Current Facility-Administered Medications   Medication Dose Route Frequency    albuterol (PROVENTIL HFA, VENTOLIN HFA, PROAIR HFA) inhaler 2 Puff  2 Puff Inhalation Q4H PRN    hydrOXYzine pamoate (VISTARIL) capsule 25 mg  25 mg Per G Tube Q6H PRN    guaiFENesin-codeine (ROBITUSSIN AC) 100-10 mg/5 mL solution 5 mL  5 mL Per G Tube Q6H PRN    dextromethorphan (DELSYM) 30 mg/5 mL syrup 30 mg  30 mg Per G Tube Q12H    lisinopriL (PRINIVIL, ZESTRIL) tablet 5 mg  5 mg Oral DAILY    metoprolol tartrate (LOPRESSOR) tablet 50 mg  50 mg Oral BID    sodium chloride (NS) flush 5-40 mL  5-40 mL IntraVENous Q8H    sodium chloride (NS) flush 5-40 mL  5-40 mL IntraVENous PRN    acetaminophen (TYLENOL) tablet 650 mg  650 mg Oral Q6H PRN    Or    acetaminophen (TYLENOL) suppository 650 mg  650 mg Rectal Q6H PRN    polyethylene glycol (MIRALAX) packet 17 g  17 g Oral DAILY PRN    ondansetron (ZOFRAN ODT) tablet 4 mg  4 mg Oral Q8H PRN    Or    ondansetron (ZOFRAN) injection 4 mg  4 mg IntraVENous Q6H PRN    doxycycline (VIBRAMYCIN) 100 mg in 0.9% sodium chloride (MBP/ADV) 100 mL MBP  100 mg IntraVENous Q12H    remdesivir 100 mg in 0.9% sodium chloride 250 mL IVPB  100 mg IntraVENous Q24H    dexamethasone (PF) (DECADRON) 10 mg/mL injection 6 mg  6 mg IntraVENous Q12H    baricitinib (OLUMIANT) tablet 4 mg  4 mg Oral DAILY    dilTIAZem ER (CARDIZEM CD) capsule 120 mg  120 mg Oral DAILY    enoxaparin (LOVENOX) injection 40 mg  1 mg/kg SubCUTAneous Q12H       Review of Systems:   A comprehensive review of systems was negative except for that written in the HPI. Objective:   Physical Exam:     Visit Vitals  BP (!) 149/70 (BP 1 Location: Right upper arm, BP Patient Position: Lying left side)   Pulse 77   Temp 97.7 °F (36.5 °C)   Resp 19   Ht 5' 5\" (1.651 m)   Wt 38.6 kg (85 lb)   SpO2 96%   Breastfeeding No   BMI 14.14 kg/m²    O2 Flow Rate (L/min): 15 l/min O2 Device: Ventimask    Temp (24hrs), Av.8 °F (36.6 °C), Min:97.7 °F (36.5 °C), Max:98 °F (36.7 °C)    701 - 1900  In: -   Out: 650 [Urine:650]   1901 - 700  In: 1850   Out: 700 [Urine:700]    General:   Awake and alert   Lungs: Few rhonchi bilateral, not labored   Chest wall:  No tenderness or deformity. Heart:  Regular rate and rhythm, S1, S2 normal, no murmur, click, rub or gallop. Abdomen:   Soft, non-tender. Bowel sounds normal. No masses,  No organomegaly. Extremities: Extremities normal, atraumatic, no cyanosis or edema. Pulses: 2+ and symmetric all extremities. Skin: Skin color, texture, turgor normal. No rashes or lesions   Neurologic: CNII-XII intact. No gross focal deficits         Data Review:       Recent Days:  Recent Labs     12/10/21  0943 21  0815   WBC 6.7 4.3   HGB 11.7 12.8   HCT 35.7 38.8   * 404*     Recent Labs     12/10/21  0943 21  0815   * 134*   K 4.3 4.6    104   CO2 24 24   * 121*   BUN 33* 22*   CREA 0.66 0.58   CA 8.4* 9.3   ALB  --  3.1*   TBILI  --  0.4   ALT  --  40     No results for input(s): PH, PCO2, PO2, HCO3, FIO2 in the last 72 hours. 24 Hour Results:  No results found for this or any previous visit (from the past 24 hour(s)). CTA CHEST W OR W WO CONT   Final Result      No pulmonary emboli. Patchy opacities in the lungs may be due to atelectasis, pneumonia or a   combination of these. Pulmonary emphysema. Atherosclerosis including coronary   arteries.  Follow-up as clinically indicated. Please see full report. XR CHEST PORT   Final Result      Pulmonary emphysema. Opacities in the lungs may represent atelectasis, pneumonia   or a combination of these. Follow-up as clinically indicated. XR CHEST PORT    (Results Pending)        Assessment:  COVID-19 with pneumonitis, non-severe disease    Acute respiratory failure with hypoxia    COPD with exacerbation    Atrial fibrillation with RVR, now back in sinus rhythm    Dehydration with hyponatremia, improved    Hypertension    Chronic PEG tube feedings due to history of dysphagia    Plan:  Continue Remdesivir, start dexamethasone,  and baricitinib  Continue Jevity tube feeds  Will change abx to zosyn, appears perhaps aspiration. Aspiration precautions. Delsym as needed  Wean o2 as tolerated    Care Plan discussed with: Patient/Family    Disposition: Continued inpatient care    Total time spent with patient: 25 minutes.     Clifton Hicks MD

## 2021-12-11 NOTE — PROGRESS NOTES
Writer assumed care of patient at 200. Patient has been complaining of shortness of breath. Patient SPO2 varied from 88-91% on 4 L NC. O2 increased to 6 L NC with resulting change in SPO2 from 89-92%. Patient was recommended to prone, ambulate early, and use incentive spirometer. Patient largely declined these recommendations. Dr. Fischer. [address] notified for PRNs for anxiety and cough. Patient states some relief from medications and more amenable to changing positions. Patient also amenable to switch to face mask and was splaced on venturi mask by respiratory therapy. SPO2 now 94-96% on venturi mask 15 L/ 50% FiO2.     Jared Su RN

## 2021-12-12 NOTE — CONSULTS
Consult  Pulmonary, Critical Care    Name: Steven Landis MRN: [id number]   : 1945 Hospital: [address] [place] [address]   Date: 2021  Admission date: 2021 Hospital Day: 5       Subjective/Interval History:   Patient seen on the medical floor. She is awake able to sit up and walk to the bedside commode she looks very weak and frail. Hospital Problems  Date Reviewed: 2021          Codes Class Noted POA    New onset atrial fibrillation (HCC) ICD-10-CM: I48.91  ICD-9-CM: 427.31  2021 Unknown        Atrial fibrillation with RVR ([address] ICD-10-CM: I48.91  ICD-9-CM: 427.31  2021 Unknown        Pneumonia due to COVID-19 virus ICD-10-CM: U07.1, J12.82  ICD-9-CM: 480.8, 079.89  2021 Unknown              IMPRESSION:   1. Acute hypoxic respiratory failure  2. COVID-19 pneumonitis  3. Chronic PEG tube with feedings  4. Probable aspiration pneumonia  5. Worsening infiltrates in the bases possible causes include vascular congestion from IV fluids worsening Covid pneumonia mucous plugging from severe COPD or aspiration  6. Worsening leukocytosis questionably aspiration event  7. Atrial fibrillation converted to sinus  8. Malnutrition  9. Body mass index is 14.14 kg/m². 10.       RECOMMENDATIONS/PLAN:   1. Continue Remdesivir Decadron baricitinib for Covid pneumonitis  2. Continue oxygen saturation to maintain above 90% currently on 50% Ventimask oxygen saturation 98  3. We will add Symbicort and place on albuterol on a set schedule  4. We will discontinue IV fluids to avoid vascular congestion  5. Continue tube feedings with water flushes  6. Agree empiric IV Zosyn          [x] High complexity decision making was performed  [x] See my orders for details      Subjective/Initial History:     I was asked by Anastasia Lema MD to see Steven Landis  a 77 y.o.    female in consultation for a chief complaint of acute hypoxic respiratory failure Covid pneumonia worsening infiltrate    No Known Allergies     MAR reviewed and pertinent medications noted or modified as needed     Current Facility-Administered Medications   Medication    bisacodyL (DULCOLAX) suppository 10 mg    docusate (COLACE) 50 mg/5 mL oral liquid 100 mg    ascorbic acid (vitamin C) (VITAMIN C) tablet 500 mg    [START ON 12/13/2021] cholecalciferol (VITAMIN D3) (1000 Units /25 mcg) tablet 2,000 Units    guaiFENesin ER (MUCINEX) tablet 600 mg    famotidine (PEPCID) tablet 20 mg    albuterol (PROVENTIL HFA, VENTOLIN HFA, PROAIR HFA) inhaler 2 Puff    budesonide-formoteroL (SYMBICORT) 160-4.5 mcg/actuation HFA inhaler 2 Puff    piperacillin-tazobactam (ZOSYN) 3.375 g in 0.9% sodium chloride (MBP/ADV) 100 mL MBP    hydrOXYzine pamoate (VISTARIL) capsule 25 mg    guaiFENesin-codeine (ROBITUSSIN AC) 100-10 mg/5 mL solution 5 mL    dextromethorphan (DELSYM) 30 mg/5 mL syrup 30 mg    lisinopriL (PRINIVIL, ZESTRIL) tablet 5 mg    metoprolol tartrate (LOPRESSOR) tablet 50 mg    sodium chloride (NS) flush 5-40 mL    sodium chloride (NS) flush 5-40 mL    acetaminophen (TYLENOL) tablet 650 mg    Or    acetaminophen (TYLENOL) suppository 650 mg    polyethylene glycol (MIRALAX) packet 17 g    ondansetron (ZOFRAN ODT) tablet 4 mg    Or    ondansetron (ZOFRAN) injection 4 mg    dexamethasone (PF) (DECADRON) 10 mg/mL injection 6 mg    baricitinib (OLUMIANT) tablet 4 mg    dilTIAZem ER (CARDIZEM CD) capsule 120 mg    enoxaparin (LOVENOX) injection 40 mg      Patient PCP: Lorrie Cole MD  PMH:  has a past medical history of COPD (chronic obstructive pulmonary disease) ([address] Essential hypertension, Family history of skin cancer, Osteoporosis, and Tobacco abuse. PSH:   has a past surgical history that includes hx mohs procedure (02/28/2018). FHX: family history includes Hypertension in her mother. SHX:  reports that she has been smoking. She started smoking about 60 years ago.  She has a 60.00 pack-year smoking history. She has never used smokeless tobacco.  Systemic review not reliable from her she does carry on a conversation but she contradicts herself several times while I am talking with her    Objective:     Vital Signs: Telemetry:    normal sinus rhythm Intake/Output:   Visit Vitals  BP (!) 150/73 (BP 1 Location: Left upper arm, BP Patient Position: Sitting)   Pulse 96   Temp 97.3 °F (36.3 °C)   Resp 18   Ht 5' 5\" (1.651 m)   Wt 38.6 kg (85 lb)   SpO2 98%   Breastfeeding No   BMI 14.14 kg/m²       Temp (24hrs), Av.5 °F (36.4 °C), Min:96.8 °F (36 °C), Max:98.8 °F (37.1 °C)        O2 Device: Ventimask O2 Flow Rate (L/min): 15 l/min       Wt Readings from Last 4 Encounters:   21 38.6 kg (85 lb)   21 36.8 kg (81 lb 3.2 oz)   21 45.4 kg (100 lb)          Intake/Output Summary (Last 24 hours) at 2021 1751  Last data filed at 2021 0306  Gross per 24 hour   Intake    Output 650 ml   Net -650 ml       Last shift:      No intake/output data recorded. Last 3 shifts: 12/10 1901 -  0700  In: 845   Out: 1500 [Urine:1500]       Physical Exam:   General:  female; thin frail ill-appearing elderly female  HEENT: NCAT,   Eyes: anicteric; conjunctiva clear extraocular movements intact  Neck: no nodes, neck veins visible but not engorged, no accessory MM use. Chest: no deformity,   Cardiac: Regular rate and rhythm  Lungs: Very poor breath sounds with severe wheezing a few rales in the bases  Abd: Thin soft positive bowel sounds PEG tube in place  Ext: no edema; no joint swelling;  No clubbing  : clear urine  Neuro: Awake alert speech is very hesitant and slow moves all 4 extremities is able to get from the bed to the bedside commode  Psych- no agitation, oriented to person  Skin: warm, dry, no cyanosis;   Pulses: Brachial and radial pulses intact  Capillary: Normal capillary refill    Labs:    Recent Labs     21  1112 12/10/21  0943   WBC 17.9* 6.7   HGB 12.6 11.7   PLT [address] [facility] Labs     12/12/21  1112 12/12/21  1111 12/10/21  0943   * 130* 134*   K 4.0 4.1 4.3   CL 97 97 102   CO2 26 25 24   * 116* 216*   BUN 23* 23* 33*   CREA 0.66 0.72 0.66   CA 8.7 8.7 8.4*     12/1250% Ventimask with oxygen saturation 9 8%  Covid antigen positive  Procalcitonin 0.11, 0.16, 0.27    CRP 0.63, 1.51  Lab Results   Component Value Date/Time    Culture result: No growth 3 days 12/08/2021 02:05 AM   No results found for: TSH, TSHEXT    Imaging:    CXR Results  (Last 48 hours)               12/11/21 1315  XR CHEST PORT Final result    Impression:  Suspect worsening pneumonia. Narrative:  XR CHEST PORT       Comparison:  12/8/2021. Single view:  Increased patchy bilateral airspace disease with lower lung   predominance suspicious for pneumonia. No pneumothorax or pleural effusion   apparent. The heart size is normal. Calcified plaque noted in the thoracic   aorta. Results from [place] encounter on 12/08/21    XR ABD (KUB)    Narrative  XR ABD (KUB)    Comparison: None. There is a gastrostomy tube in the left upper quadrant. No dilated loops of  bowel. There is some increased stool throughout the colon and rectum consistent  with some constipation. There is no organomegaly. There are extensive arterial  calcifications in the abdominal aorta and some branch vessels. Bony structures  are unremarkable. Impression  No acute process. Constipation. Atherosclerosis. XR CHEST PORT    Narrative  XR CHEST PORT    Comparison:  12/8/2021. Single view:  Increased patchy bilateral airspace disease with lower lung  predominance suspicious for pneumonia. No pneumothorax or pleural effusion  apparent. The heart size is normal. Calcified plaque noted in the thoracic  aorta. Impression  Suspect worsening pneumonia.       XR CHEST PORT    Narrative  Chest one view    INDICATION: Shortness of breath    COMPARISON: 11/16/2021    FINDINGS:    Heart size within normal limits. Emphysematous changes and bilateral opacities  in the lungs. No pleural effusions. No pneumothorax. No displaced fracture in  the visualized bony structures. Impression  Pulmonary emphysema. Opacities in the lungs may represent atelectasis, pneumonia  or a combination of these. Follow-up as clinically indicated. Results from [place] encounter on 12/08/21    CTA CHEST W OR W WO CONT    Narrative  CTA chest with intravenous contrast, 80 cc Isovue-370, 3-D MIP images    Dose reduction: All CT scans at this facility are performed using dose reduction  optimization techniques as appropriate to a performed exam including the  following: Automated exposure control, adjustments of the mA and/or kV according  to patient size, or use of iterative reconstruction technique. INDICATION: Hypoxic    COMPARISON: 11/9/2021    FINDINGS:    No pulmonary emboli are identified. No aneurysm or dissection of thoracic aorta. Atherosclerosis including coronary arteries. Slightly prominent mediastinal and  hilar nodes may be reactive. No pleural or pericardial effusions. Partially  visualized left renal lesions are too small to characterize, may represent cysts  or other. Emphysematous changes patchy bilateral opacities in the lungs. Mild  peribronchial thickening. Areas of mild mucus plugging. No pneumothorax. No  acute fracture in the visualized bony structures. Degenerative changes  visualized upper lumbar spine. Impression  No pulmonary emboli. Patchy opacities in the lungs may be due to atelectasis, pneumonia or a  combination of these. Pulmonary emphysema. Atherosclerosis including coronary  arteries. Follow-up as clinically indicated. Please see full report. Discussion: Worsening infiltrate on chest x-ray may represent some congestion from IV fluid administration.   She has been given 1 dose of Lasix will discontinue IV fluids continue her tube feedings. She has Covid 19 antigen positive with pneumonitis consistent with Covid is on Remdesivir Decadron and baricitinib would continue them. She has a chronic PEG tube in place for tube feedings for failure to thrive appears very poorly nutrition with protein wasting. She has significant wheezing is getting as needed albuterol and averaging only twice a day will place it every 6 hours and add Symbicort which she was on at home.   She is on Decadron which will give her steroids for her COPD as well  ·     Delmy Alicia MD

## 2021-12-12 NOTE — PROGRESS NOTES
Hospitalist Progress Note               Daily Progress Note: 12/12/2021      Subjective: The patient is seen for follow up. She is a 78-year-old female with history of COPD and hypertension, brought to the ED overnight with shortness of breath and generalized weakness. She has had cough as well. Arrival she had oxygen saturations in the 80s on room air. She tested positive for COVID-19. Multiple family members are also positive. CTA was negative for PE and reports patchy opacities, pna or atelectasis. She was also found to have rapid atrial fibrillation which is new for her  Patient is unvaccinated for Covid. She had been sick for 2 or 3 days  Patient was admitted, started on Remdesivir and steroids along with baricitinib  -------    Patient seen today for follow-up. Remains on ventimask, spo2 currently 96%  States she feels some improvement. C/o pain at g tube site otherwise no pain, nausea or vomiting. CXr 12/11 appears worse pna    She is alert and pleasant. She is tolerating peg feeds reportedly- on hold transiently awaiting kub which reported no acute process other than constipation.   SR 70's        Problem List:  Problem List as of 12/12/2021 Date Reviewed: 11/12/2021          Codes Class Noted - Resolved    New onset atrial fibrillation ([facility] ICD-10-CM: I48.91  ICD-9-CM: 427.31  12/8/2021 - Present        Atrial fibrillation with RVR ([facility] ICD-10-CM: I48.91  ICD-9-CM: 427.31  12/8/2021 - Present        Pneumonia due to COVID-19 virus ICD-10-CM: U07.1, J12.82  ICD-9-CM: 480.8, 079.89  12/8/2021 - Present        Dysphagia ICD-10-CM: R13.10  ICD-9-CM: 787.20  11/10/2021 - Present        Failure to thrive (child) ICD-10-CM: R62.51  ICD-9-CM: 783.41  11/10/2021 - Present        Severe protein-calorie malnutrition ([facility] ICD-10-CM: E43  ICD-9-CM: 262  11/10/2021 - Present        Unintentional weight loss ICD-10-CM: R63.4  ICD-9-CM: 783.21  11/10/2021 - Present        COPD with acute exacerbation ([facility] ICD-10-CM: J44.1  ICD-9-CM: 491.21  11/9/2021 - Present        Acute respiratory failure with hypoxia [facility]) ICD-10-CM: J96.01  ICD-9-CM: 518.81  11/9/2021 - Present        Hypertension ICD-10-CM: I10  ICD-9-CM: 401.9  11/9/2021 - Present        Tobacco abuse ICD-10-CM: Z72.0  ICD-9-CM: 305.1  11/9/2021 - Present        Osteoporosis ICD-10-CM: M81.0  ICD-9-CM: 733.00  11/9/2021 - Present        Community acquired pneumonia ICD-10-CM: J18.9  ICD-9-CM: 518  11/9/2021 - Present        Bronchiectasis ([facility] ICD-10-CM: J47.9  ICD-9-CM: 494.0  11/9/2021 - Present              Medications reviewed  Current Facility-Administered Medications   Medication Dose Route Frequency    bisacodyL (DULCOLAX) suppository 10 mg  10 mg Rectal DAILY PRN    docusate (COLACE) 50 mg/5 mL oral liquid 100 mg  100 mg Per G Tube BID    albuterol (PROVENTIL HFA, VENTOLIN HFA, PROAIR HFA) inhaler 2 Puff  2 Puff Inhalation Q4H PRN    piperacillin-tazobactam (ZOSYN) 3.375 g in 0.9% sodium chloride (MBP/ADV) 100 mL MBP  3.375 g IntraVENous Q8H    hydrOXYzine pamoate (VISTARIL) capsule 25 mg  25 mg Per G Tube Q6H PRN    guaiFENesin-codeine (ROBITUSSIN AC) 100-10 mg/5 mL solution 5 mL  5 mL Per G Tube Q6H PRN    dextromethorphan (DELSYM) 30 mg/5 mL syrup 30 mg  30 mg Per G Tube Q12H    lisinopriL (PRINIVIL, ZESTRIL) tablet 5 mg  5 mg Oral DAILY    metoprolol tartrate (LOPRESSOR) tablet 50 mg  50 mg Oral BID    sodium chloride (NS) flush 5-40 mL  5-40 mL IntraVENous Q8H    sodium chloride (NS) flush 5-40 mL  5-40 mL IntraVENous PRN    acetaminophen (TYLENOL) tablet 650 mg  650 mg Oral Q6H PRN    Or    acetaminophen (TYLENOL) suppository 650 mg  650 mg Rectal Q6H PRN    polyethylene glycol (MIRALAX) packet 17 g  17 g Oral DAILY PRN    ondansetron (ZOFRAN ODT) tablet 4 mg  4 mg Oral Q8H PRN    Or    ondansetron (ZOFRAN) injection 4 mg  4 mg IntraVENous Q6H PRN    dexamethasone (PF) (DECADRON) 10 mg/mL injection 6 mg  6 mg IntraVENous Q12H    baricitinib (OLUMIANT) tablet 4 mg  4 mg Oral DAILY    dilTIAZem ER (CARDIZEM CD) capsule 120 mg  120 mg Oral DAILY    enoxaparin (LOVENOX) injection 40 mg  1 mg/kg SubCUTAneous Q12H       Review of Systems:   A comprehensive review of systems was negative except for that written in the HPI. Objective:   Physical Exam:     Visit Vitals  BP (!) 150/73 (BP 1 Location: Left upper arm, BP Patient Position: Sitting)   Pulse 96   Temp 97.3 °F (36.3 °C)   Resp 21   Ht 5' 5\" (1.651 m)   Wt 38.6 kg (85 lb)   SpO2 92%   Breastfeeding No   BMI 14.14 kg/m²    O2 Flow Rate (L/min): 15 l/min O2 Device: Ventimask    Temp (24hrs), Av.5 °F (36.4 °C), Min:96.8 °F (36 °C), Max:98.8 °F (37.1 °C)    No intake/output data recorded. 12/10 1901 -  0700  In: 845   Out: 1500 [Urine:1500]    General:   Awake and alert   Lungs: Few rhonchi bilateral bases, no wheeze, not labored   Chest wall:  No tenderness or deformity. Heart:  Regular rate and rhythm, S1, S2 normal, no murmur, click, rub or gallop. Abdomen:   Soft, non-tender. Bowel sounds normal. No masses,  No organomegaly. Peg site dressing adhered to site, mildly erythematous, not purulent . Extremities: Extremities normal, atraumatic, no cyanosis   Pulses: 2+ and symmetric all extremities. Skin: Warm, dry, poor turgor. Neurologic: CNII-XII intact. No gross focal deficits         Data Review:       Recent Days:  Recent Labs     21  1112 12/10/21  0943   WBC 17.9* 6.7   HGB 12.6 11.7   HCT 38.0 35.7   * 432*     Recent Labs     21  1112 21  1111 12/10/21  0943   * 130* 134*   K 4.0 4.1 4.3   CL 97 97 102   CO2 26 25 24   * 116* 216*   BUN 23* 23* 33*   CREA 0.66 0.72 0.66   CA 8.7 8.7 8.4*     No results for input(s): PH, PCO2, PO2, HCO3, FIO2 in the last 72 hours.     24 Hour Results:  Recent Results (from the past 24 hour(s))   METABOLIC PANEL, BASIC    Collection Time: 21 11:11 AM   Result Value Ref Range    Sodium 130 (L) 136 - 145 mmol/L    Potassium 4.1 3.5 - 5.1 mmol/L    Chloride 97 97 - 108 mmol/L    CO2 25 21 - 32 mmol/L    Anion gap 8 5 - 15 mmol/L    Glucose 116 (H) 65 - 100 mg/dL    BUN 23 (H) 6 - 20 mg/dL    Creatinine 0.72 0.55 - 1.02 mg/dL    BUN/Creatinine ratio 32 (H) 12 - 20      GFR est AA >60 >60 ml/min/1.73m2    GFR est non-AA >60 >60 ml/min/1.73m2    Calcium 8.7 8.5 - 72.7 mg/dL   METABOLIC PANEL, BASIC    Collection Time: 12/12/21 11:12 AM   Result Value Ref Range    Sodium 129 (L) 136 - 145 mmol/L    Potassium 4.0 3.5 - 5.1 mmol/L    Chloride 97 97 - 108 mmol/L    CO2 26 21 - 32 mmol/L    Anion gap 6 5 - 15 mmol/L    Glucose 114 (H) 65 - 100 mg/dL    BUN 23 (H) 6 - 20 mg/dL    Creatinine 0.66 0.55 - 1.02 mg/dL    BUN/Creatinine ratio 35 (H) 12 - 20      GFR est AA >60 >60 ml/min/1.73m2    GFR est non-AA >60 >60 ml/min/1.73m2    Calcium 8.7 8.5 - 10.1 mg/dL   CBC WITH AUTOMATED DIFF    Collection Time: 12/12/21 11:12 AM   Result Value Ref Range    WBC 17.9 (H) 3.6 - 11.0 K/uL    RBC 4.18 3.80 - 5.20 M/uL    HGB 12.6 11.5 - 16.0 g/dL    HCT 38.0 35.0 - 47.0 %    MCV 90.9 80.0 - 99.0 FL    MCH 30.1 26.0 - 34.0 PG    MCHC 33.2 30.0 - 36.5 g/dL    RDW 16.0 (H) 11.5 - 14.5 %    PLATELET 352 (H) 079 - 400 K/uL    MPV 10.7 8.9 - 12.9 FL    NRBC 0.0 0.0  WBC    ABSOLUTE NRBC 0.00 0.00 - 0.01 K/uL    NEUTROPHILS 86 (H) 32 - 75 %    LYMPHOCYTES 5 (L) 12 - 49 %    MONOCYTES 8 5 - 13 %    EOSINOPHILS 0 0 - 7 %    BASOPHILS 0 0 - 1 %    IMMATURE GRANULOCYTES 1 (H) 0 - 0.5 %    ABS. NEUTROPHILS 15.4 (H) 1.8 - 8.0 K/UL    ABS. LYMPHOCYTES 1.0 0.8 - 3.5 K/UL    ABS. MONOCYTES 1.4 (H) 0.0 - 1.0 K/UL    ABS. EOSINOPHILS 0.0 0.0 - 0.4 K/UL    ABS. BASOPHILS 0.0 0.0 - 0.1 K/UL    ABS. IMM.  GRANS. 0.1 (H) 0.00 - 0.04 K/UL    DF AUTOMATED     PROCALCITONIN    Collection Time: 12/12/21 11:12 AM   Result Value Ref Range    Procalcitonin 0.11 (H) 0 ng/mL   D DIMER    Collection Time: 12/12/21 11:12 AM   Result Value Ref Range    D DIMER 0.69 (H) <0.50 ug/ml(FEU)       XR ABD (KUB)   Final Result   No acute process. Constipation. Atherosclerosis. XR CHEST PORT   Final Result   Suspect worsening pneumonia. CTA CHEST W OR W WO CONT   Final Result      No pulmonary emboli. Patchy opacities in the lungs may be due to atelectasis, pneumonia or a   combination of these. Pulmonary emphysema. Atherosclerosis including coronary   arteries. Follow-up as clinically indicated. Please see full report. XR CHEST PORT   Final Result      Pulmonary emphysema. Opacities in the lungs may represent atelectasis, pneumonia   or a combination of these. Follow-up as clinically indicated. Assessment:  COVID-19 with pneumonitis, o2 requirements increase yesterday, remains on ventimask, cxr 12/11 suggesting worse pna, aspiration? abx switched to zosyn. Procal today descending. Acute respiratory failure with hypoxia, onc o2 with ventimask maintaining spo2>93%    COPD with exacerbation    Atrial fibrillation with RVR, now back and remains in sinus rhythm    Dehydration with hyponatremia, improving volume status, descending na to 129    Hypertension    Chronic PEG tube feedings due to history of dysphagia, irritation surrounding site    Constipation    Plan:  Continue Remdesivir, dexamethasone,  and baricitinib  Continue Jevity tube feeds  d2 zosyn, appears perhaps aspiration. Aspiration precautions. Antitussive  Prn inh albuterol  Wean o2 as tolerated  In lieu of increased o2 requirements will cx pulmonology   Lasix 20 mg x 1, I & o's  Bowel regimen  Repeat cxr am    Vtep: lovenox  Gip:pepcid  Full code  Dispo: pending course , >48    Care Plan discussed with: Patient/Family    Disposition: Continued inpatient care    Total time spent with patient: 40 minutes.     Gina Krueger MD

## 2021-12-12 NOTE — PROGRESS NOTES
Patient complaining of burning sensation \"inside\" after PEG bolus feed. There is some brown and purulent drainage at PEG site. Notified Dr. Sun Macias who would like to defer to day-time physician.      Ragini Nichols RN

## 2021-12-13 NOTE — PROGRESS NOTES
Consult  Pulmonary, Critical Care    Name: Poonam Moreno MRN: [id number]   : 1945 Hospital: [address] [place] [address]   Date: 2021  Admission date: 2021 Hospital Day: 6       Subjective/Interval History:   Patient seen on the medical floor. She is awake able to sit up and walk to the bedside commode she looks very weak and frail.  No particular complaints still looks very frail. FiO2 down to 35%    Hospital Problems  Date Reviewed: 2021          Codes Class Noted POA    New onset atrial fibrillation (HCC) ICD-10-CM: I48.91  ICD-9-CM: 427.31  2021 Unknown        Atrial fibrillation with RVR (HCC) ICD-10-CM: I48.91  ICD-9-CM: 427.31  2021 Unknown        Pneumonia due to COVID-19 virus ICD-10-CM: U07.1, J12.82  ICD-9-CM: 480.8, 079.89  2021 Unknown              IMPRESSION:   1. Acute hypoxic respiratory failure now on 35% Ventimask  2. COVID-19 pneumonitis  3. Chronic PEG tube with feedings  4. Probable aspiration pneumonia more noticeable infiltrate left base  5. Worsening infiltrates in the bases possible causes include vascular congestion from IV fluids worsening Covid pneumonia mucous plugging from severe COPD or aspiration  6. Worsening leukocytosis questionably aspiration event  7. COPD with bronchospasm improved  8. Hypophosphatemia to be repleted  9. Atrial fibrillation converted to sinus  10. Malnutrition  Body mass index is 14.38 kg/m². 11.       RECOMMENDATIONS/PLAN:   1. Continue  Decadron baricitinib has completed Remdesivir  2. Continue oxygen saturation to maintain above 90% currently on 35% Ventimask oxygen saturation 93  3. Continue Symbicort and albuterol on a set schedule  4. Continue tube feedings with water flushes  5. Agree empiric IV Zosyn          [x] High complexity decision making was performed  [x] See my orders for details      Subjective/Initial History:     I was asked by Gabriel Bear MD to see Poonam Moreno  a 77 y.o.  female in consultation for a chief complaint of acute hypoxic respiratory failure Covid pneumonia worsening infiltrate    No Known Allergies     MAR reviewed and pertinent medications noted or modified as needed     Current Facility-Administered Medications   Medication    furosemide (LASIX) injection 20 mg    bisacodyL (DULCOLAX) suppository 10 mg    docusate (COLACE) 50 mg/5 mL oral liquid 100 mg    ascorbic acid (vitamin C) (VITAMIN C) tablet 500 mg    cholecalciferol (VITAMIN D3) (1000 Units /25 mcg) tablet 2,000 Units    guaiFENesin ER (MUCINEX) tablet 600 mg    famotidine (PEPCID) tablet 20 mg    albuterol (PROVENTIL HFA, VENTOLIN HFA, PROAIR HFA) inhaler 2 Puff    budesonide-formoteroL (SYMBICORT) 160-4.5 mcg/actuation HFA inhaler 2 Puff    piperacillin-tazobactam (ZOSYN) 3.375 g in 0.9% sodium chloride (MBP/ADV) 100 mL MBP    hydrOXYzine pamoate (VISTARIL) capsule 25 mg    guaiFENesin-codeine (ROBITUSSIN AC) 100-10 mg/5 mL solution 5 mL    dextromethorphan (DELSYM) 30 mg/5 mL syrup 30 mg    lisinopriL (PRINIVIL, ZESTRIL) tablet 5 mg    metoprolol tartrate (LOPRESSOR) tablet 50 mg    sodium chloride (NS) flush 5-40 mL    sodium chloride (NS) flush 5-40 mL    acetaminophen (TYLENOL) tablet 650 mg    Or    acetaminophen (TYLENOL) suppository 650 mg    polyethylene glycol (MIRALAX) packet 17 g    ondansetron (ZOFRAN ODT) tablet 4 mg    Or    ondansetron (ZOFRAN) injection 4 mg    dexamethasone (PF) (DECADRON) 10 mg/mL injection 6 mg    baricitinib (OLUMIANT) tablet 4 mg    dilTIAZem ER (CARDIZEM CD) capsule 120 mg    enoxaparin (LOVENOX) injection 40 mg      Patient PCP: Chantal Beatty MD  PMH:  has a past medical history of COPD (chronic obstructive pulmonary disease) ([facility] [address] Essential hypertension, Family history of skin cancer, Osteoporosis, and Tobacco abuse. PSH:   has a past surgical history that includes hx mohs procedure (02/28/2018).    FHX: family history includes Hypertension in her mother. SHX:  reports that she has been smoking. She started smoking about 60 years ago. She has a 60.00 pack-year smoking history. She has never used smokeless tobacco.  Systemic review not reliable from her she does carry on a conversation but she contradicts herself several times while I am talking with her    Objective:     Vital Signs: Telemetry:    normal sinus rhythm Intake/Output:   Visit Vitals  BP (!) 141/65   Pulse 83   Temp 98.2 °F (36.8 °C)   Resp 22   Ht 5' 5\" (1.651 m)   Wt 39.2 kg (86 lb 6.7 oz)   SpO2 94%   Breastfeeding No   BMI 14.38 kg/m²       Temp (24hrs), Av °F (36.7 °C), Min:97.3 °F (36.3 °C), Max:98.4 °F (36.9 °C)        O2 Device: Ventimask O2 Flow Rate (L/min): 13 l/min       Wt Readings from Last 4 Encounters:   21 39.2 kg (86 lb 6.7 oz)   21 36.8 kg (81 lb 3.2 oz)   21 45.4 kg (100 lb)          Intake/Output Summary (Last 24 hours) at 2021 1259  Last data filed at 2021 1744  Gross per 24 hour   Intake 437 ml   Output    Net 437 ml       Last shift:      No intake/output data recorded. Last 3 shifts:  1901 -  0700  In: 437   Out: 650 [Urine:650]       Physical Exam:   General:  female; thin frail ill-appearing elderly female  HEENT: NCAT,   Eyes: anicteric; conjunctiva clear extraocular movements intact  Neck: no nodes, neck veins visible but not engorged, no accessory MM use. Chest: no deformity,   Cardiac: Regular rate and rhythm  Lungs: Poor breath sounds but slightly improved rales in the bases no wheezing today  Abd: Thin soft positive bowel sounds PEG tube in place  Ext: no edema; no joint swelling;  No clubbing  : clear urine  Neuro: Awake alert speech is very hesitant and slow moves all 4 extremities is able to get from the bed to the bedside commode  Psych- no agitation, oriented to person  Skin: warm, dry, no cyanosis;   Pulses: Brachial and radial pulses intact  Capillary: Normal capillary refill    Labs:    Recent Labs     12/13/21  0916 12/12/21  1112   WBC 20.6* 17.9*   HGB 11.6 12.6   * 469*     Recent Labs     12/13/21  0916 12/12/21  1112 12/12/21  1111   * 129* 130*   K 4.5 4.0 4.1   CL 99 97 97   CO2 25 26 25   * 114* 116*   BUN 35* 23* 23*   CREA 0.56 0.66 0.72   CA 8.9 8.7 8.7   MG 2.3  --   --    PHOS 2.0*  --   --    ALB 2.7*  --   --    12/13 on 30% Ventimask oxygen saturation 92 to 93%  12/12 50% Ventimask with oxygen saturation 9 8%  Covid antigen positive  Procalcitonin 0.11, 0.16, 0.27    CRP 0.63, 1.51  Lab Results   Component Value Date/Time    Culture result: No growth 4 days 12/08/2021 02:05 AM   No results found for: TSH, TSHEXT, TSHEXT    Imaging:    CXR Results  (Last 48 hours)               12/13/21 1002  XR CHEST PORT Final result    Narrative:  Chest single view. Comparison single chest 12/11/2021. Patchy reticular markings lower lungs. Left basilar opacity is new compared to   prior imaging. Findings concerning for pneumonia, advanced compared to prior   imaging. Cardiac silhouette within normal limits. Elongated thoracic aorta with   atherosclerosis. No pneumothorax. Small dependent left pleural effusion. 12/11/21 1315  XR CHEST PORT Final result    Impression:  Suspect worsening pneumonia. Narrative:  XR CHEST PORT       Comparison:  12/8/2021. Single view:  Increased patchy bilateral airspace disease with lower lung   predominance suspicious for pneumonia. No pneumothorax or pleural effusion   apparent. The heart size is normal. Calcified plaque noted in the thoracic   aorta. Results from [place] encounter on 12/08/21    XR CHEST PORT    Narrative  Chest single view. Comparison single chest 12/11/2021. Patchy reticular markings lower lungs. Left basilar opacity is new compared to  prior imaging. Findings concerning for pneumonia, advanced compared to prior  imaging.   Cardiac silhouette within normal limits. Elongated thoracic aorta with  atherosclerosis. No pneumothorax. Small dependent left pleural effusion. XR ABD (KUB)    Narrative  XR ABD (KUB)    Comparison: None. There is a gastrostomy tube in the left upper quadrant. No dilated loops of  bowel. There is some increased stool throughout the colon and rectum consistent  with some constipation. There is no organomegaly. There are extensive arterial  calcifications in the abdominal aorta and some branch vessels. Bony structures  are unremarkable. Impression  No acute process. Constipation. Atherosclerosis. XR CHEST PORT    Narrative  XR CHEST PORT    Comparison:  12/8/2021. Single view:  Increased patchy bilateral airspace disease with lower lung  predominance suspicious for pneumonia. No pneumothorax or pleural effusion  apparent. The heart size is normal. Calcified plaque noted in the thoracic  aorta. Impression  Suspect worsening pneumonia. Results from [place] encounter on 12/08/21    CTA CHEST W OR W WO CONT    Narrative  CTA chest with intravenous contrast, 80 cc Isovue-370, 3-D MIP images    Dose reduction: All CT scans at this facility are performed using dose reduction  optimization techniques as appropriate to a performed exam including the  following: Automated exposure control, adjustments of the mA and/or kV according  to patient size, or use of iterative reconstruction technique. INDICATION: Hypoxic    COMPARISON: 11/9/2021    FINDINGS:    No pulmonary emboli are identified. No aneurysm or dissection of thoracic aorta. Atherosclerosis including coronary arteries. Slightly prominent mediastinal and  hilar nodes may be reactive. No pleural or pericardial effusions. Partially  visualized left renal lesions are too small to characterize, may represent cysts  or other. Emphysematous changes patchy bilateral opacities in the lungs. Mild  peribronchial thickening.  Areas of mild mucus plugging. No pneumothorax. No  acute fracture in the visualized bony structures. Degenerative changes  visualized upper lumbar spine. Impression  No pulmonary emboli. Patchy opacities in the lungs may be due to atelectasis, pneumonia or a  combination of these. Pulmonary emphysema. Atherosclerosis including coronary  arteries. Follow-up as clinically indicated. Please see full report. Discussion: Worsening infiltrate on chest x-ray may represent some congestion from IV fluid administration. She has been given 1 dose of Lasix will discontinue IV fluids continue her tube feedings. She has Covid 19 antigen positive with pneumonitis consistent with Covid is on Remdesivir Decadron and baricitinib would continue them. She has a chronic PEG tube in place for tube feedings for failure to thrive appears very poorly nutrition with protein wasting. She has significant wheezing is getting as needed albuterol and averaging only twice a day will place it every 6 hours and add Symbicort which she was on at home. She is on Decadron which will give her steroids for her COPD as well  12/13 may be slightly improved FiO2 has been dropped to 35%. Urine output not recorded yesterday but she states she did have significant diuresis after Lasix. Creatinine stable we will repeat Lasix today.   Phosphorus is low to be repleted  ·     Luli Abbott MD

## 2021-12-13 NOTE — PROGRESS NOTES
CM has reviewed chart and spoken to primary physician. Patient required ventimask over the weekend and still on ventimask to meet oxygen needs at this time. [facility] and will continue to follow patient for any changes in discharge needs. If rehab is needed patient will not require authorization.

## 2021-12-14 NOTE — PROGRESS NOTES
CM reviewed chart and spoke to primary physician. Patient still requiring ventimask. Patient will likely need some sort of rehab at discharge, but is not appropriate to work with PT/OT at this time. CM will continue to follow. CM has also updated [facility] and [facility].

## 2021-12-14 NOTE — PROGRESS NOTES
Consult  Pulmonary, Critical Care    Name: Shelbi Morton MRN: [id number]   : 1945 Hospital: [address] [place] [address]   Date: 2021  Admission date: 2021 Hospital Day: 7       Subjective/Interval History:   Patient seen on the medical floor. She is awake able to sit up and walk to the bedside commode she looks very weak and frail.  No particular complaints still looks very frail. FiO2 down to 35%    Hospital Problems  Date Reviewed: 2021          Codes Class Noted POA    New onset atrial fibrillation (HCC) ICD-10-CM: I48.91  ICD-9-CM: 427.31  2021 Unknown        Atrial fibrillation with RVR (HCC) ICD-10-CM: I48.91  ICD-9-CM: 427.31  2021 Unknown        Pneumonia due to COVID-19 virus ICD-10-CM: U07.1, J12.82  ICD-9-CM: 480.8, 079.89  2021 Unknown              IMPRESSION:   1. Acute hypoxic respiratory failure now on 35% Ventimask  2. COVID-19 pneumonitis  3. Chronic PEG tube with feedings  4. Probable aspiration pneumonia more noticeable infiltrate left base  5. Worsening infiltrates in the bases possible causes include vascular congestion from IV fluids worsening Covid pneumonia mucous plugging from severe COPD or aspiration  6. Worsening leukocytosis questionably aspiration event  7. COPD with bronchospasm improved  8. Hypophosphatemia to be repleted  9. Atrial fibrillation converted to sinus  10. Malnutrition  Body mass index is 14.38 kg/m². RECOMMENDATIONS/PLAN:   1. Will continue to wean oxygen per protocol try nasal cannula  2. Continue  Decadron baricitinib has completed Remdesivir  3. Continue oxygen saturation to maintain above 90% currently on 35% Ventimask oxygen saturation 93  4. Continue Symbicort and albuterol on a set schedule  5. Continue tube feedings with water flushes  6.  Agree empiric IV Zosyn          [x] High complexity decision making was performed  [x] See my orders for details      Subjective/Initial History:     I was asked by Dougie Cool MD to see Roni Thomas  a 77 y.o.  female in consultation for a chief complaint of acute hypoxic respiratory failure Covid pneumonia worsening infiltrate    No Known Allergies     MAR reviewed and pertinent medications noted or modified as needed     Current Facility-Administered Medications   Medication    bisacodyL (DULCOLAX) suppository 10 mg    docusate (COLACE) 50 mg/5 mL oral liquid 100 mg    ascorbic acid (vitamin C) (VITAMIN C) tablet 500 mg    cholecalciferol (VITAMIN D3) (1000 Units /25 mcg) tablet 2,000 Units    guaiFENesin ER (MUCINEX) tablet 600 mg    famotidine (PEPCID) tablet 20 mg    albuterol (PROVENTIL HFA, VENTOLIN HFA, PROAIR HFA) inhaler 2 Puff    budesonide-formoteroL (SYMBICORT) 160-4.5 mcg/actuation HFA inhaler 2 Puff    piperacillin-tazobactam (ZOSYN) 3.375 g in 0.9% sodium chloride (MBP/ADV) 100 mL MBP    hydrOXYzine pamoate (VISTARIL) capsule 25 mg    guaiFENesin-codeine (ROBITUSSIN AC) 100-10 mg/5 mL solution 5 mL    dextromethorphan (DELSYM) 30 mg/5 mL syrup 30 mg    lisinopriL (PRINIVIL, ZESTRIL) tablet 5 mg    metoprolol tartrate (LOPRESSOR) tablet 50 mg    sodium chloride (NS) flush 5-40 mL    sodium chloride (NS) flush 5-40 mL    acetaminophen (TYLENOL) tablet 650 mg    Or    acetaminophen (TYLENOL) suppository 650 mg    polyethylene glycol (MIRALAX) packet 17 g    ondansetron (ZOFRAN ODT) tablet 4 mg    Or    ondansetron (ZOFRAN) injection 4 mg    dexamethasone (PF) (DECADRON) 10 mg/mL injection 6 mg    baricitinib (OLUMIANT) tablet 4 mg    dilTIAZem ER (CARDIZEM CD) capsule 120 mg    enoxaparin (LOVENOX) injection 40 mg      Patient PCP: Praveen Raymond MD  PMH:  has a past medical history of COPD (chronic obstructive pulmonary disease) ([address] Essential hypertension, Family history of skin cancer, Osteoporosis, and Tobacco abuse. PSH:   has a past surgical history that includes hx mohs procedure (02/28/2018).    FHX: family history includes Hypertension in her mother. SHX:  reports that she has been smoking. She started smoking about 60 years ago. She has a 60.00 pack-year smoking history. She has never used smokeless tobacco.  Systemic review not reliable from her she does carry on a conversation but she contradicts herself several times while I am talking with her    Objective:     Vital Signs: Telemetry:    normal sinus rhythm Intake/Output:   Visit Vitals  BP (!) 114/58   Pulse 87   Temp 98 °F (36.7 °C)   Resp 20   Ht 5' 5\" (1.651 m)   Wt 39.2 kg (86 lb 6.7 oz)   SpO2 94%   Breastfeeding No   BMI 14.38 kg/m²       Temp (24hrs), Av.6 °F (36.4 °C), Min:97.4 °F (36.3 °C), Max:98 °F (36.7 °C)        O2 Device: Ventimask O2 Flow Rate (L/min): 13 l/min       Wt Readings from Last 4 Encounters:   21 39.2 kg (86 lb 6.7 oz)   21 36.8 kg (81 lb 3.2 oz)   21 45.4 kg (100 lb)          Intake/Output Summary (Last 24 hours) at 2021 1052  Last data filed at 2021 1826  Gross per 24 hour   Intake 1205 ml   Output 1000 ml   Net 205 ml       Last shift:      No intake/output data recorded. Last 3 shifts:  1901 -  0700  In: 1205   Out: 1000 [Urine:1000]       Physical Exam:   General:  female; thin frail ill-appearing elderly female  HEENT: NCAT,   Eyes: anicteric; conjunctiva clear extraocular movements intact  Neck: no nodes, neck veins visible but not engorged, no accessory MM use. Chest: no deformity,   Cardiac: Regular rate and rhythm  Lungs: Poor breath sounds but slightly improved rales in the bases no wheezing today  Abd: Thin soft positive bowel sounds PEG tube in place  Ext: no edema; no joint swelling;  No clubbing  : clear urine  Neuro: Awake alert speech is very hesitant and slow moves all 4 extremities is able to get from the bed to the bedside commode  Psych- no agitation, oriented to person  Skin: warm, dry, no cyanosis;   Pulses: Brachial and radial pulses intact  Capillary: Normal capillary refill    Labs:    Recent Labs     12/14/21  0733 12/13/21  0916 12/12/21  1112   WBC 16.7* 20.6* 17.9*   HGB 10.5* 11.6 12.6   * 482* 469*     Recent Labs     12/14/21  0733 12/13/21  0916 12/12/21  1112   * 132* 129*   K 4.7 4.5 4.0   CL 96* 99 97   CO2 28 25 26   * 108* 114*   BUN 31* 35* 23*   CREA 0.67 0.56 0.66   CA 8.2* 8.9 8.7   MG  --  2.3  --    PHOS 3.6 2.0*  --    ALB 2.2* 2.7*  --    12/13 on 30% Ventimask oxygen saturation 92 to 93%  12/12 50% Ventimask with oxygen saturation 9 8%  Covid antigen positive  Procalcitonin 0.11, 0.16, 0.27    CRP 0.63, 1.51  Lab Results   Component Value Date/Time    Culture result: No growth 5 days 12/08/2021 02:05 AM   No results found for: TSH, TSHEXT, TSHEXT    Imaging:    CXR Results  (Last 48 hours)               12/13/21 1002  XR CHEST PORT Final result    Narrative:  Chest single view. Comparison single chest 12/11/2021. Patchy reticular markings lower lungs. Left basilar opacity is new compared to   prior imaging. Findings concerning for pneumonia, advanced compared to prior   imaging. Cardiac silhouette within normal limits. Elongated thoracic aorta with   atherosclerosis. No pneumothorax. Small dependent left pleural effusion. Results from [place] encounter on 12/08/21    XR CHEST PORT    Narrative  Chest single view. Comparison single chest 12/11/2021. Patchy reticular markings lower lungs. Left basilar opacity is new compared to  prior imaging. Findings concerning for pneumonia, advanced compared to prior  imaging. Cardiac silhouette within normal limits. Elongated thoracic aorta with  atherosclerosis. No pneumothorax. Small dependent left pleural effusion. XR ABD (KUB)    Narrative  XR ABD (KUB)    Comparison: None. There is a gastrostomy tube in the left upper quadrant. No dilated loops of  bowel.  There is some increased stool throughout the colon and rectum consistent  with some constipation. There is no organomegaly. There are extensive arterial  calcifications in the abdominal aorta and some branch vessels. Bony structures  are unremarkable. Impression  No acute process. Constipation. Atherosclerosis. XR CHEST PORT    Narrative  XR CHEST PORT    Comparison:  12/8/2021. Single view:  Increased patchy bilateral airspace disease with lower lung  predominance suspicious for pneumonia. No pneumothorax or pleural effusion  apparent. The heart size is normal. Calcified plaque noted in the thoracic  aorta. Impression  Suspect worsening pneumonia. Results from [place] encounter on 12/08/21    CTA CHEST W OR W WO CONT    Narrative  CTA chest with intravenous contrast, 80 cc Isovue-370, 3-D MIP images    Dose reduction: All CT scans at this facility are performed using dose reduction  optimization techniques as appropriate to a performed exam including the  following: Automated exposure control, adjustments of the mA and/or kV according  to patient size, or use of iterative reconstruction technique. INDICATION: Hypoxic    COMPARISON: 11/9/2021    FINDINGS:    No pulmonary emboli are identified. No aneurysm or dissection of thoracic aorta. Atherosclerosis including coronary arteries. Slightly prominent mediastinal and  hilar nodes may be reactive. No pleural or pericardial effusions. Partially  visualized left renal lesions are too small to characterize, may represent cysts  or other. Emphysematous changes patchy bilateral opacities in the lungs. Mild  peribronchial thickening. Areas of mild mucus plugging. No pneumothorax. No  acute fracture in the visualized bony structures. Degenerative changes  visualized upper lumbar spine. Impression  No pulmonary emboli. Patchy opacities in the lungs may be due to atelectasis, pneumonia or a  combination of these. Pulmonary emphysema. Atherosclerosis including coronary  arteries.  Follow-up as clinically indicated. Please see full report. Discussion: Worsening infiltrate on chest x-ray may represent some congestion from IV fluid administration. She has been given 1 dose of Lasix will discontinue IV fluids continue her tube feedings. She has Covid 19 antigen positive with pneumonitis consistent with Covid is on Remdesivir Decadron and baricitinib would continue them. She has a chronic PEG tube in place for tube feedings for failure to thrive appears very poorly nutrition with protein wasting. She has significant wheezing is getting as needed albuterol and averaging only twice a day will place it every 6 hours and add Symbicort which she was on at home. She is on Decadron which will give her steroids for her COPD as well  12/13 may be slightly improved FiO2 has been dropped to 35%. Urine output not recorded yesterday but she states she did have significant diuresis after Lasix. Creatinine stable we will repeat Lasix today.   Phosphorus is low to be repleted  ·     Ailyn Gage MD

## 2021-12-14 NOTE — PROGRESS NOTES
Hospitalist Progress Note               Daily Progress Note: 12/14/2021      Subjective: The patient is seen for follow up. She is a 18-year-old female with history of COPD and hypertension, brought to the ED overnight with shortness of breath and generalized weakness. She has had cough as well. Arrival she had oxygen saturations in the 80s on room air. She tested positive for COVID-19. Multiple family members are also positive. CTA was negative for PE and reports patchy opacities, pna or atelectasis. She was also found to have rapid atrial fibrillation which is new for her  Patient is unvaccinated for Covid. She had been sick for 2 or 3 days  Patient was admitted, started on Remdesivir and steroids along with baricitinib  -------    Patient seen today for follow-up. She states she's better  Remains on ventimask, spo2 currently 96%  States she feels some improvement. Not much change today with o2 requirement though she says she is improving. C/o pain at g tube site otherwise no pain, nausea or vomiting. CXr 12/11 appears worse pna    Tf resumed  Mariann gtube site care    She is alert and pleasant. She is tolerating peg feeds reportedly- on hold transiently awaiting kub which reported no acute process other than constipation.   SR         Problem List:  Problem List as of 12/14/2021 Date Reviewed: 11/12/2021          Codes Class Noted - Resolved    New onset atrial fibrillation ([facility] [address] ICD-10-CM: I48.91  ICD-9-CM: 427.31  12/8/2021 - Present        Atrial fibrillation with RVR ([facility] [address] ICD-10-CM: I48.91  ICD-9-CM: 427.31  12/8/2021 - Present        Pneumonia due to COVID-19 virus ICD-10-CM: U07.1, J12.82  ICD-9-CM: 480.8, 079.89  12/8/2021 - Present        Dysphagia ICD-10-CM: R13.10  ICD-9-CM: 787.20  11/10/2021 - Present        Failure to thrive (child) ICD-10-CM: R62.51  ICD-9-CM: 783.41  11/10/2021 - Present        Severe protein-calorie malnutrition ([facility] [address] ICD-10-CM: K21  ICD-9-CM: 262  11/10/2021 - Present        Unintentional weight loss ICD-10-CM: R63.4  ICD-9-CM: 783.21  11/10/2021 - Present        COPD with acute exacerbation (HCC) ICD-10-CM: J44.1  ICD-9-CM: 491.21  11/9/2021 - Present        Acute respiratory failure with hypoxia [facility]) ICD-10-CM: J96.01  ICD-9-CM: 518.81  11/9/2021 - Present        Hypertension ICD-10-CM: I10  ICD-9-CM: 401.9  11/9/2021 - Present        Tobacco abuse ICD-10-CM: Z72.0  ICD-9-CM: 305.1  11/9/2021 - Present        Osteoporosis ICD-10-CM: M81.0  ICD-9-CM: 733.00  11/9/2021 - Present        Community acquired pneumonia ICD-10-CM: J18.9  ICD-9-CM: 725  11/9/2021 - Present        Bronchiectasis ([address] ICD-10-CM: J47.9  ICD-9-CM: 494.0  11/9/2021 - Present              Medications reviewed  Current Facility-Administered Medications   Medication Dose Route Frequency    bisacodyL (DULCOLAX) suppository 10 mg  10 mg Rectal DAILY PRN    docusate (COLACE) 50 mg/5 mL oral liquid 100 mg  100 mg Per G Tube BID    ascorbic acid (vitamin C) (VITAMIN C) tablet 500 mg  500 mg Per G Tube BID    cholecalciferol (VITAMIN D3) (1000 Units /25 mcg) tablet 2,000 Units  2,000 Units Oral DAILY    guaiFENesin ER (MUCINEX) tablet 600 mg  600 mg Oral BID    famotidine (PEPCID) tablet 20 mg  20 mg Oral BID    albuterol (PROVENTIL HFA, VENTOLIN HFA, PROAIR HFA) inhaler 2 Puff  2 Puff Inhalation Q6H RT    budesonide-formoteroL (SYMBICORT) 160-4.5 mcg/actuation HFA inhaler 2 Puff  2 Puff Inhalation BID RT    piperacillin-tazobactam (ZOSYN) 3.375 g in 0.9% sodium chloride (MBP/ADV) 100 mL MBP  3.375 g IntraVENous Q8H    hydrOXYzine pamoate (VISTARIL) capsule 25 mg  25 mg Per G Tube Q6H PRN    guaiFENesin-codeine (ROBITUSSIN AC) 100-10 mg/5 mL solution 5 mL  5 mL Per G Tube Q6H PRN    dextromethorphan (DELSYM) 30 mg/5 mL syrup 30 mg  30 mg Per G Tube Q12H    lisinopriL (PRINIVIL, ZESTRIL) tablet 5 mg  5 mg Oral DAILY    metoprolol tartrate (LOPRESSOR) tablet 50 mg  50 mg Oral BID    sodium chloride (NS) flush 5-40 mL  5-40 mL IntraVENous Q8H    sodium chloride (NS) flush 5-40 mL  5-40 mL IntraVENous PRN    acetaminophen (TYLENOL) tablet 650 mg  650 mg Oral Q6H PRN    Or    acetaminophen (TYLENOL) suppository 650 mg  650 mg Rectal Q6H PRN    polyethylene glycol (MIRALAX) packet 17 g  17 g Oral DAILY PRN    ondansetron (ZOFRAN ODT) tablet 4 mg  4 mg Oral Q8H PRN    Or    ondansetron (ZOFRAN) injection 4 mg  4 mg IntraVENous Q6H PRN    dexamethasone (PF) (DECADRON) 10 mg/mL injection 6 mg  6 mg IntraVENous Q12H    baricitinib (OLUMIANT) tablet 4 mg  4 mg Oral DAILY    dilTIAZem ER (CARDIZEM CD) capsule 120 mg  120 mg Oral DAILY    enoxaparin (LOVENOX) injection 40 mg  1 mg/kg SubCUTAneous Q12H       Review of Systems:   A comprehensive review of systems was negative except for that written in the HPI. Objective:   Physical Exam:     Visit Vitals  BP (!) 114/58   Pulse 87   Temp 98 °F (36.7 °C)   Resp 20   Ht 5' 5\" (1.651 m)   Wt 39.2 kg (86 lb 6.7 oz)   SpO2 94%   Breastfeeding No   BMI 14.38 kg/m²    O2 Flow Rate (L/min): 13 l/min O2 Device: Ventimask    Temp (24hrs), Av.7 °F (36.5 °C), Min:97.4 °F (36.3 °C), Max:98 °F (36.7 °C)    701 -  1900  In: 410   Out: -     190 -  0700  In: 6247   Out: 1000 [Urine:1000]    General:   Awake and alert   Lungs: Few rhonchi bilateral bases, no wheeze, not labored   Chest wall:  No tenderness or deformity. Heart:  Regular rate and rhythm, S1, S2 normal, no murmur, click, rub or gallop. Abdomen:   Soft, non-tender. Bowel sounds normal. No masses,  No organomegaly. Peg site dressing adhered to site, mildly erythematous, not purulent . Extremities: Extremities normal, atraumatic, no cyanosis   Pulses: 2+ and symmetric all extremities. Skin: Warm, dry, poor turgor. Neurologic: CNII-XII intact.   No gross focal deficits         Data Review:       Recent Days:  Recent Labs     21  0733 21  0916 21  1112 WBC 16.7* 20.6* 17.9*   HGB 10.5* 11.6 12.6   HCT 30.5* 34.7* 38.0   * 482* 469*     Recent Labs     12/14/21  0733 12/13/21  0916 12/12/21  1112   * 132* 129*   K 4.7 4.5 4.0   CL 96* 99 97   CO2 28 25 26   * 108* 114*   BUN 31* 35* 23*   CREA 0.67 0.56 0.66   CA 8.2* 8.9 8.7   MG  --  2.3  --    PHOS 3.6 2.0*  --    ALB 2.2* 2.7*  --      No results for input(s): PH, PCO2, PO2, HCO3, FIO2 in the last 72 hours. 24 Hour Results:  Recent Results (from the past 24 hour(s))   RENAL FUNCTION PANEL    Collection Time: 12/14/21  7:33 AM   Result Value Ref Range    Sodium 131 (L) 136 - 145 mmol/L    Potassium 4.7 3.5 - 5.1 mmol/L    Chloride 96 (L) 97 - 108 mmol/L    CO2 28 21 - 32 mmol/L    Anion gap 7 5 - 15 mmol/L    Glucose 134 (H) 65 - 100 mg/dL    BUN 31 (H) 6 - 20 mg/dL    Creatinine 0.67 0.55 - 1.02 mg/dL    BUN/Creatinine ratio 46 (H) 12 - 20      GFR est AA >60 >60 ml/min/1.73m2    GFR est non-AA >60 >60 ml/min/1.73m2    Calcium 8.2 (L) 8.5 - 10.1 mg/dL    Phosphorus 3.6 2.6 - 4.7 mg/dL    Albumin 2.2 (L) 3.5 - 5.0 g/dL   CBC WITH AUTOMATED DIFF    Collection Time: 12/14/21  7:33 AM   Result Value Ref Range    WBC 16.7 (H) 3.6 - 11.0 K/uL    RBC 3.39 (L) 3.80 - 5.20 M/uL    HGB 10.5 (L) 11.5 - 16.0 g/dL    HCT 30.5 (L) 35.0 - 47.0 %    MCV 90.0 80.0 - 99.0 FL    MCH 31.0 26.0 - 34.0 PG    MCHC 34.4 30.0 - 36.5 g/dL    RDW 16.2 (H) 11.5 - 14.5 %    PLATELET 537 (H) 392 - 400 K/uL    MPV 10.7 8.9 - 12.9 FL    NRBC 0.0 0.0  WBC    ABSOLUTE NRBC 0.00 0.00 - 0.01 K/uL    NEUTROPHILS 89 (H) 32 - 75 %    LYMPHOCYTES 4 (L) 12 - 49 %    MONOCYTES 6 5 - 13 %    EOSINOPHILS 0 0 - 7 %    BASOPHILS 0 0 - 1 %    IMMATURE GRANULOCYTES 1 (H) 0 - 0.5 %    ABS. NEUTROPHILS 14.9 (H) 1.8 - 8.0 K/UL    ABS. LYMPHOCYTES 0.7 (L) 0.8 - 3.5 K/UL    ABS. MONOCYTES 1.0 0.0 - 1.0 K/UL    ABS. EOSINOPHILS 0.0 0.0 - 0.4 K/UL    ABS. BASOPHILS 0.0 0.0 - 0.1 K/UL    ABS. IMM.  GRANS. 0.2 (H) 0.00 - 0.04 K/UL    DF AUTOMATED         XR CHEST PORT   Final Result      XR ABD (KUB)   Final Result   No acute process. Constipation. Atherosclerosis. XR CHEST PORT   Final Result   Suspect worsening pneumonia. CTA CHEST W OR W WO CONT   Final Result      No pulmonary emboli. Patchy opacities in the lungs may be due to atelectasis, pneumonia or a   combination of these. Pulmonary emphysema. Atherosclerosis including coronary   arteries. Follow-up as clinically indicated. Please see full report. XR CHEST PORT   Final Result      Pulmonary emphysema. Opacities in the lungs may represent atelectasis, pneumonia   or a combination of these. Follow-up as clinically indicated. Assessment:  COVID-19 with pneumonitis, o2 requirements increase yesterday, remains on ventimask, cxr 12/11 suggesting worse pna, aspiration? abx switched to zosyn. Procal descending. Acute respiratory failure with hypoxia, onc o2 with ventimask maintaining spo2>93%    COPD with exacerbation    Atrial fibrillation with RVR, now back and remains in sinus rhythm    Dehydration with hyponatremia, improving volume status, improved to 132    Hypertension    Chronic PEG tube feedings due to history of dysphagia, irritation surrounding site    Constipation    Plan:  Continue Remdesivir, dexamethasone,  and baricitinib  Continue Jevity tube feeds   zosyn, preumtive aspiration  Aspiration precautions. Antitussive  Prn inh albuterol  Wean o2 as tolerated  In lieu of increased o2 requirements pulm cx'd   symbicort + scheduled albuterol  Lasix rn, may be volume component,   Bowel regimen      Vtep: lovenox  Gip:pepcid  Full code  Dispo: pending course , >48, I believe c is preference, if needs snf will not need auth, pt/ot    Care Plan discussed with: Patient/Family    Disposition: Continued inpatient care    Total time spent with patient: 40 minutes.     Zayra Everett MD

## 2021-12-14 NOTE — PROGRESS NOTES
Hospitalist Progress Note               Daily Progress Note: 12/13/2021      Subjective: The patient is seen for follow up. She is a 45-year-old female with history of COPD and hypertension, brought to the ED overnight with shortness of breath and generalized weakness. She has had cough as well. Arrival she had oxygen saturations in the 80s on room air. She tested positive for COVID-19. Multiple family members are also positive. CTA was negative for PE and reports patchy opacities, pna or atelectasis. She was also found to have rapid atrial fibrillation which is new for her  Patient is unvaccinated for Covid. She had been sick for 2 or 3 days  Patient was admitted, started on Remdesivir and steroids along with baricitinib  -------    Patient seen today for follow-up. She states she's better  Remains on ventimask, spo2 currently 96%  States she feels some improvement. Not much change today with o2 requirement though she says she is improving. C/o pain at g tube site otherwise no pain, nausea or vomiting. CXr 12/11 appears worse pna    Tf resumed  Mariann gtube site care    She is alert and pleasant. She is tolerating peg feeds reportedly- on hold transiently awaiting kub which reported no acute process other than constipation.   SR         Problem List:  Problem List as of 12/13/2021 Date Reviewed: 11/12/2021          Codes Class Noted - Resolved    New onset atrial fibrillation ([facility] [address] ICD-10-CM: I48.91  ICD-9-CM: 427.31  12/8/2021 - Present        Atrial fibrillation with RVR ([facility] [address] ICD-10-CM: I48.91  ICD-9-CM: 427.31  12/8/2021 - Present        Pneumonia due to COVID-19 virus ICD-10-CM: U07.1, J12.82  ICD-9-CM: 480.8, 079.89  12/8/2021 - Present        Dysphagia ICD-10-CM: R13.10  ICD-9-CM: 787.20  11/10/2021 - Present        Failure to thrive (child) ICD-10-CM: R62.51  ICD-9-CM: 783.41  11/10/2021 - Present        Severe protein-calorie malnutrition ([facility] [address] ICD-10-CM: W80  ICD-9-CM: 262  11/10/2021 - Present        Unintentional weight loss ICD-10-CM: R63.4  ICD-9-CM: 783.21  11/10/2021 - Present        COPD with acute exacerbation (HCC) ICD-10-CM: J44.1  ICD-9-CM: 491.21  11/9/2021 - Present        Acute respiratory failure with hypoxia [facility]) ICD-10-CM: J96.01  ICD-9-CM: 518.81  11/9/2021 - Present        Hypertension ICD-10-CM: I10  ICD-9-CM: 401.9  11/9/2021 - Present        Tobacco abuse ICD-10-CM: Z72.0  ICD-9-CM: 305.1  11/9/2021 - Present        Osteoporosis ICD-10-CM: M81.0  ICD-9-CM: 733.00  11/9/2021 - Present        Community acquired pneumonia ICD-10-CM: J18.9  ICD-9-CM: 285  11/9/2021 - Present        Bronchiectasis ([facility] [address] ICD-10-CM: J47.9  ICD-9-CM: 494.0  11/9/2021 - Present              Medications reviewed  Current Facility-Administered Medications   Medication Dose Route Frequency    potassium, sodium phosphates (NEUTRA-PHOS) packet 2 Packet  2 Packet Oral Q4H    bisacodyL (DULCOLAX) suppository 10 mg  10 mg Rectal DAILY PRN    docusate (COLACE) 50 mg/5 mL oral liquid 100 mg  100 mg Per G Tube BID    ascorbic acid (vitamin C) (VITAMIN C) tablet 500 mg  500 mg Per G Tube BID    cholecalciferol (VITAMIN D3) (1000 Units /25 mcg) tablet 2,000 Units  2,000 Units Oral DAILY    guaiFENesin ER (MUCINEX) tablet 600 mg  600 mg Oral BID    famotidine (PEPCID) tablet 20 mg  20 mg Oral BID    albuterol (PROVENTIL HFA, VENTOLIN HFA, PROAIR HFA) inhaler 2 Puff  2 Puff Inhalation Q6H RT    budesonide-formoteroL (SYMBICORT) 160-4.5 mcg/actuation HFA inhaler 2 Puff  2 Puff Inhalation BID RT    piperacillin-tazobactam (ZOSYN) 3.375 g in 0.9% sodium chloride (MBP/ADV) 100 mL MBP  3.375 g IntraVENous Q8H    hydrOXYzine pamoate (VISTARIL) capsule 25 mg  25 mg Per G Tube Q6H PRN    guaiFENesin-codeine (ROBITUSSIN AC) 100-10 mg/5 mL solution 5 mL  5 mL Per G Tube Q6H PRN    dextromethorphan (DELSYM) 30 mg/5 mL syrup 30 mg  30 mg Per G Tube Q12H    lisinopriL (PRINIVIL, ZESTRIL) tablet 5 mg  5 mg Oral DAILY    metoprolol tartrate (LOPRESSOR) tablet 50 mg  50 mg Oral BID    sodium chloride (NS) flush 5-40 mL  5-40 mL IntraVENous Q8H    sodium chloride (NS) flush 5-40 mL  5-40 mL IntraVENous PRN    acetaminophen (TYLENOL) tablet 650 mg  650 mg Oral Q6H PRN    Or    acetaminophen (TYLENOL) suppository 650 mg  650 mg Rectal Q6H PRN    polyethylene glycol (MIRALAX) packet 17 g  17 g Oral DAILY PRN    ondansetron (ZOFRAN ODT) tablet 4 mg  4 mg Oral Q8H PRN    Or    ondansetron (ZOFRAN) injection 4 mg  4 mg IntraVENous Q6H PRN    dexamethasone (PF) (DECADRON) 10 mg/mL injection 6 mg  6 mg IntraVENous Q12H    baricitinib (OLUMIANT) tablet 4 mg  4 mg Oral DAILY    dilTIAZem ER (CARDIZEM CD) capsule 120 mg  120 mg Oral DAILY    enoxaparin (LOVENOX) injection 40 mg  1 mg/kg SubCUTAneous Q12H       Review of Systems:   A comprehensive review of systems was negative except for that written in the HPI. Objective:   Physical Exam:     Visit Vitals  BP (!) 141/65   Pulse 83   Temp 98.2 °F (36.8 °C)   Resp 22   Ht 5' 5\" (1.651 m)   Wt 39.2 kg (86 lb 6.7 oz)   SpO2 94%   Breastfeeding No   BMI 14.38 kg/m²    O2 Flow Rate (L/min): 13 l/min O2 Device: Ventimask    Temp (24hrs), Av.3 °F (36.8 °C), Min:98.2 °F (36.8 °C), Max:98.4 °F (36.9 °C)    No intake/output data recorded.  0701 -  1900  In: 1642   Out: 1000 [Urine:1000]    General:   Awake and alert   Lungs: Few rhonchi bilateral bases, no wheeze, not labored   Chest wall:  No tenderness or deformity. Heart:  Regular rate and rhythm, S1, S2 normal, no murmur, click, rub or gallop. Abdomen:   Soft, non-tender. Bowel sounds normal. No masses,  No organomegaly. Peg site dressing adhered to site, mildly erythematous, not purulent . Extremities: Extremities normal, atraumatic, no cyanosis   Pulses: 2+ and symmetric all extremities. Skin: Warm, dry, poor turgor. Neurologic: CNII-XII intact.   No gross focal deficits         Data Review: Recent Days:  Recent Labs     12/13/21  0916 12/12/21  1112   WBC 20.6* 17.9*   HGB 11.6 12.6   HCT 34.7* 38.0   * 469*     Recent Labs     12/13/21  0916 12/12/21  1112 12/12/21  1111   * 129* 130*   K 4.5 4.0 4.1   CL 99 97 97   CO2 25 26 25   * 114* 116*   BUN 35* 23* 23*   CREA 0.56 0.66 0.72   CA 8.9 8.7 8.7   MG 2.3  --   --    PHOS 2.0*  --   --    ALB 2.7*  --   --      No results for input(s): PH, PCO2, PO2, HCO3, FIO2 in the last 72 hours. 24 Hour Results:  Recent Results (from the past 24 hour(s))   VITAMIN D, 25 HYDROXY    Collection Time: 12/12/21  9:48 PM   Result Value Ref Range    Vitamin D 25-Hydroxy 48.8 30 - 100 ng/mL   VITAMIN D, 25 HYDROXY    Collection Time: 12/13/21  9:16 AM   Result Value Ref Range    Vitamin D 25-Hydroxy 51.2 30 - 100 ng/mL   NT-PRO BNP    Collection Time: 12/13/21  9:16 AM   Result Value Ref Range    NT pro-BNP 1,228 (H) <450 pg/mL   MAGNESIUM    Collection Time: 12/13/21  9:16 AM   Result Value Ref Range    Magnesium 2.3 1.6 - 2.4 mg/dL   CBC W/O DIFF    Collection Time: 12/13/21  9:16 AM   Result Value Ref Range    WBC 20.6 (H) 3.6 - 11.0 K/uL    RBC 3.85 3.80 - 5.20 M/uL    HGB 11.6 11.5 - 16.0 g/dL    HCT 34.7 (L) 35.0 - 47.0 %    MCV 90.1 80.0 - 99.0 FL    MCH 30.1 26.0 - 34.0 PG    MCHC 33.4 30.0 - 36.5 g/dL    RDW 16.1 (H) 11.5 - 14.5 %    PLATELET 695 (H) 849 - 400 K/uL    MPV 10.9 8.9 - 12.9 FL    NRBC 0.0 0.0  WBC    ABSOLUTE NRBC 0.00 0.00 - 0.01 K/uL   DIFFERENTIAL, AUTO    Collection Time: 12/13/21  9:16 AM   Result Value Ref Range    NEUTROPHILS 77 (H) 32 - 75 %    LYMPHOCYTES 10 (L) 12 - 49 %    MONOCYTES 12 5 - 13 %    EOSINOPHILS 0 0 - 7 %    BASOPHILS 0 0 - 1 %    IMMATURE GRANULOCYTES 1 (H) 0 - 0.5 %    ABS. NEUTROPHILS 16.0 (H) 1.8 - 8.0 K/UL    ABS. LYMPHOCYTES 2.0 0.8 - 3.5 K/UL    ABS. MONOCYTES 2.5 (H) 0.0 - 1.0 K/UL    ABS. EOSINOPHILS 0.0 0.0 - 0.4 K/UL    ABS. BASOPHILS 0.0 0.0 - 0.1 K/UL    ABS. IMM.  GRANS. 0.1 (H) 0.00 - 0.04 K/UL    DF AUTOMATED     RENAL FUNCTION PANEL    Collection Time: 12/13/21  9:16 AM   Result Value Ref Range    Sodium 132 (L) 136 - 145 mmol/L    Potassium 4.5 3.5 - 5.1 mmol/L    Chloride 99 97 - 108 mmol/L    CO2 25 21 - 32 mmol/L    Anion gap 8 5 - 15 mmol/L    Glucose 108 (H) 65 - 100 mg/dL    BUN 35 (H) 6 - 20 mg/dL    Creatinine 0.56 0.55 - 1.02 mg/dL    BUN/Creatinine ratio 63 (H) 12 - 20      GFR est AA >60 >60 ml/min/1.73m2    GFR est non-AA >60 >60 ml/min/1.73m2    Calcium 8.9 8.5 - 10.1 mg/dL    Phosphorus 2.0 (L) 2.6 - 4.7 mg/dL    Albumin 2.7 (L) 3.5 - 5.0 g/dL       XR CHEST PORT   Final Result      XR ABD (KUB)   Final Result   No acute process. Constipation. Atherosclerosis. XR CHEST PORT   Final Result   Suspect worsening pneumonia. CTA CHEST W OR W WO CONT   Final Result      No pulmonary emboli. Patchy opacities in the lungs may be due to atelectasis, pneumonia or a   combination of these. Pulmonary emphysema. Atherosclerosis including coronary   arteries. Follow-up as clinically indicated. Please see full report. XR CHEST PORT   Final Result      Pulmonary emphysema. Opacities in the lungs may represent atelectasis, pneumonia   or a combination of these. Follow-up as clinically indicated. Assessment:  COVID-19 with pneumonitis, o2 requirements increase yesterday, remains on ventimask, cxr 12/11 suggesting worse pna, aspiration? abx switched to zosyn. Procal descending.      Acute respiratory failure with hypoxia, onc o2 with ventimask maintaining spo2>93%    COPD with exacerbation    Atrial fibrillation with RVR, now back and remains in sinus rhythm    Dehydration with hyponatremia, improving volume status, improved to 132    Hypertension    Chronic PEG tube feedings due to history of dysphagia, irritation surrounding site    Constipation    Plan:  Continue Remdesivir, dexamethasone,  and baricitinib  Continue Jevity tube feeds   zosyn, preumtive aspiration  Aspiration precautions. Antitussive  Prn inh albuterol  Wean o2 as tolerated  In lieu of increased o2 requirements pulm cx'd   symbicort + scheduled albuterol  Lasix rn, may be volume component,   Bowel regimen      Vtep: lovenox  Gip:pepcid  Full code  Dispo: pending course , >48, I believe [facility] is preference, if needs snf will not need auth, pt/ot    Care Plan discussed with: Patient/Family    Disposition: Continued inpatient care    Total time spent with patient: 40 minutes.     Mio Zacarias MD

## 2021-12-14 NOTE — PROGRESS NOTES
Patient requires venti-mask and will titrate down within o2 needs. , Patient tolerating feeds       Problem: Risk for Spread of Infection  Goal: Prevent transmission of infectious organism to others  Description: Prevent the transmission of infectious organisms to other patients, staff members, and visitors. Outcome: Progressing Towards Goal     Problem: Patient Education:  Go to Education Activity  Goal: Patient/Family Education  Outcome: Progressing Towards Goal     Problem: Airway Clearance - Ineffective  Goal: Achieve or maintain patent airway  Outcome: Progressing Towards Goal     Problem: Gas Exchange - Impaired  Goal: Absence of hypoxia  Outcome: Progressing Towards Goal  Goal: Promote optimal lung function  Outcome: Progressing Towards Goal     Problem: Breathing Pattern - Ineffective  Goal: Ability to achieve and maintain a regular respiratory rate  Outcome: Progressing Towards Goal     Problem:  Body Temperature -  Risk of, Imbalanced  Goal: Ability to maintain a body temperature within defined limits  Outcome: Progressing Towards Goal  Goal: Complications related to the disease process, condition or treatment will be avoided or minimized  Outcome: Progressing Towards Goal     Problem: Isolation Precautions - Risk of Spread of Infection  Goal: Prevent transmission of infectious organism to others  Outcome: Progressing Towards Goal     Problem: Nutrition Deficits  Goal: Optimize nutrtional status  Outcome: Progressing Towards Goal     Problem: Risk for Fluid Volume Deficit  Goal: Maintain normal heart rhythm  Outcome: Progressing Towards Goal  Goal: Maintain absence of muscle cramping  Outcome: Progressing Towards Goal  Goal: Maintain normal serum potassium, sodium, calcium, phosphorus, and pH  Outcome: Progressing Towards Goal     Problem: Loneliness or Risk for Loneliness  Goal: Demonstrate positive use of time alone when socialization is not possible  Outcome: Progressing Towards Goal     Problem: Fatigue  Goal: Verbalize increase energy and improved vitality  Outcome: Progressing Towards Goal     Problem: Patient Education: Go to Patient Education Activity  Goal: Patient/Family Education  Outcome: Progressing Towards Goal     Problem: Falls - Risk of  Goal: *Absence of Falls  Description: Document Anup Caruso Fall Risk and appropriate interventions in the flowsheet.   Outcome: Progressing Towards Goal  Note: Fall Risk Interventions:  Mobility Interventions: Patient to call before getting OOB, OT consult for ADLs, PT Consult for mobility concerns         Medication Interventions: Bed/chair exit alarm, Teach patient to arise slowly, Patient to call before getting OOB    Elimination Interventions: Call light in reach, Bed/chair exit alarm, Stay With Me (per policy), Toilet paper/wipes in reach, Patient to call for help with toileting needs    History of Falls Interventions: Bed/chair exit alarm, Room close to nurse's station, Utilize gait belt for transfer/ambulation, Assess for delayed presentation/identification of injury for 48 hrs (comment for end date)         Problem: Patient Education: Go to Patient Education Activity  Goal: Patient/Family Education  Outcome: Progressing Towards Goal

## 2021-12-15 NOTE — PROGRESS NOTES
CM reviewed chart and spoke to primary physician. Patient still likely to require 48 hours or more before medically ready for discharge. CM awaiting PT/OT eval for recommendations. Patient currently accepted with [facility] for tube feedings. CM has updated both companies and will continue to follow for recommendations. Patient will not require authorization for rehab or SNF.

## 2021-12-15 NOTE — PROGRESS NOTES
Consult  Pulmonary, Critical Care    Name: Chayo Campoverde MRN: [id number]   : 1945 Hospital: [address] [place] [address]   Date: 12/15/2021  Admission date: 2021 Hospital Day: 8       Subjective/Interval History:   Patient seen on the medical floor. She is awake able to sit up and walk to the bedside commode she looks very weak and frail.  No particular complaints still looks very frail. FiO2 down to 35%    Hospital Problems  Date Reviewed: 2021          Codes Class Noted POA    New onset atrial fibrillation (HCC) ICD-10-CM: I48.91  ICD-9-CM: 427.31  2021 Unknown        Atrial fibrillation with RVR (HCC) ICD-10-CM: I48.91  ICD-9-CM: 427.31  2021 Unknown        Pneumonia due to COVID-19 virus ICD-10-CM: U07.1, J12.82  ICD-9-CM: 480.8, 079.89  2021 Unknown              IMPRESSION:   1. Acute hypoxic respiratory failure now on 35% Ventimask will start weaning from Ventimask to nasal cannula  2. COVID-19 pneumonitis  3. Chronic PEG tube with feedings  4. Probable aspiration pneumonia more noticeable infiltrate left base  5. Worsening infiltrates in the bases possible causes include vascular congestion from IV fluids worsening Covid pneumonia mucous plugging from severe COPD or aspiration  6. Worsening leukocytosis questionably aspiration event  7. COPD with bronchospasm improved  8. Hypophosphatemia to be repleted  9. Atrial fibrillation converted to sinus  10. Malnutrition  Body mass index is 14.38 kg/m². RECOMMENDATIONS/PLAN:   1. Will continue to wean oxygen per protocol try nasal cannula  2. Continue  Decadron baricitinib has completed Remdesivir  3. Continue oxygen saturation to maintain above 90% currently on 35% Ventimask oxygen saturation 93  4. Continue Symbicort and albuterol on a set schedule  5. Continue tube feedings with water flushes  6.  Agree empiric IV Zosyn          [x] High complexity decision making was performed  [x] See my orders for details      Subjective/Initial History:     I was asked by Dougie Cool MD to see Roni Thomas  a 77 y.o.  female in consultation for a chief complaint of acute hypoxic respiratory failure Covid pneumonia worsening infiltrate    No Known Allergies     MAR reviewed and pertinent medications noted or modified as needed     Current Facility-Administered Medications   Medication    bisacodyL (DULCOLAX) suppository 10 mg    docusate (COLACE) 50 mg/5 mL oral liquid 100 mg    ascorbic acid (vitamin C) (VITAMIN C) tablet 500 mg    cholecalciferol (VITAMIN D3) (1000 Units /25 mcg) tablet 2,000 Units    guaiFENesin ER (MUCINEX) tablet 600 mg    famotidine (PEPCID) tablet 20 mg    albuterol (PROVENTIL HFA, VENTOLIN HFA, PROAIR HFA) inhaler 2 Puff    budesonide-formoteroL (SYMBICORT) 160-4.5 mcg/actuation HFA inhaler 2 Puff    piperacillin-tazobactam (ZOSYN) 3.375 g in 0.9% sodium chloride (MBP/ADV) 100 mL MBP    hydrOXYzine pamoate (VISTARIL) capsule 25 mg    guaiFENesin-codeine (ROBITUSSIN AC) 100-10 mg/5 mL solution 5 mL    dextromethorphan (DELSYM) 30 mg/5 mL syrup 30 mg    lisinopriL (PRINIVIL, ZESTRIL) tablet 5 mg    metoprolol tartrate (LOPRESSOR) tablet 50 mg    sodium chloride (NS) flush 5-40 mL    sodium chloride (NS) flush 5-40 mL    acetaminophen (TYLENOL) tablet 650 mg    Or    acetaminophen (TYLENOL) suppository 650 mg    polyethylene glycol (MIRALAX) packet 17 g    ondansetron (ZOFRAN ODT) tablet 4 mg    Or    ondansetron (ZOFRAN) injection 4 mg    dexamethasone (PF) (DECADRON) 10 mg/mL injection 6 mg    baricitinib (OLUMIANT) tablet 4 mg    dilTIAZem ER (CARDIZEM CD) capsule 120 mg    enoxaparin (LOVENOX) injection 40 mg      Patient PCP: Praveen Raymond MD  PMH:  has a past medical history of COPD (chronic obstructive pulmonary disease) ([address] Essential hypertension, Family history of skin cancer, Osteoporosis, and Tobacco abuse.   PSH:   has a past surgical history that includes hx mohs procedure (2018). FHX: family history includes Hypertension in her mother. SHX:  reports that she has been smoking. She started smoking about 60 years ago. She has a 60.00 pack-year smoking history. She has never used smokeless tobacco.  Systemic review not reliable from her she does carry on a conversation but she contradicts herself several times while I am talking with her    Objective:     Vital Signs: Telemetry:    normal sinus rhythm Intake/Output:   Visit Vitals  BP (!) 152/60   Pulse 84   Temp 98.2 °F (36.8 °C)   Resp 20   Ht 5' 5\" (1.651 m)   Wt 39.2 kg (86 lb 6.7 oz)   SpO2 97%   Breastfeeding No   BMI 14.38 kg/m²       Temp (24hrs), Av.9 °F (36.6 °C), Min:97.5 °F (36.4 °C), Max:98.2 °F (36.8 °C)        O2 Device: Ventimask O2 Flow Rate (L/min): 12 l/min       Wt Readings from Last 4 Encounters:   21 39.2 kg (86 lb 6.7 oz)   21 36.8 kg (81 lb 3.2 oz)   21 45.4 kg (100 lb)          Intake/Output Summary (Last 24 hours) at 12/15/2021 1107  Last data filed at 2021 1611  Gross per 24 hour   Intake 760 ml   Output    Net 760 ml       Last shift:      No intake/output data recorded. Last 3 shifts:  1901 - 12/15 0700  In: 760   Out: -        Physical Exam:   General:  female; thin frail ill-appearing elderly female  HEENT: NCAT,   Eyes: anicteric; conjunctiva clear extraocular movements intact  Neck: no nodes, neck veins visible but not engorged, no accessory MM use. Chest: no deformity,   Cardiac: Regular rate and rhythm  Lungs: Poor breath sounds but slightly improved rales in the bases no wheezing today  Abd: Thin soft positive bowel sounds PEG tube in place  Ext: no edema; no joint swelling;  No clubbing  : clear urine  Neuro: Awake alert speech is very hesitant and slow moves all 4 extremities is able to get from the bed to the bedside commode  Psych- no agitation, oriented to person  Skin: warm, dry, no cyanosis;   Pulses: Brachial and radial pulses intact  Capillary: Normal capillary refill    Labs:    Recent Labs     12/14/21  0733 12/13/21  0916 12/12/21  1112   WBC 16.7* 20.6* 17.9*   HGB 10.5* 11.6 12.6   * 482* 469*     Recent Labs     12/14/21  0733 12/13/21  0916 12/12/21  1112   * 132* 129*   K 4.7 4.5 4.0   CL 96* 99 97   CO2 28 25 26   * 108* 114*   BUN 31* 35* 23*   CREA 0.67 0.56 0.66   CA 8.2* 8.9 8.7   MG  --  2.3  --    PHOS 3.6 2.0*  --    ALB 2.2* 2.7*  --    12/13 on 30% Ventimask oxygen saturation 92 to 93%  12/12 50% Ventimask with oxygen saturation 9 8%  Covid antigen positive  Procalcitonin 0.11, 0.16, 0.27    CRP 0.63, 1.51  Lab Results   Component Value Date/Time    Culture result: No growth 6 days 12/08/2021 02:05 AM   No results found for: TSH, TSHEXT, TSHEXT    Imaging:    CXR Results  (Last 48 hours)    None        Results from Hospital Encounter encounter on 12/08/21    XR CHEST PORT    Narrative  Chest single view. Comparison single chest 12/11/2021. Patchy reticular markings lower lungs. Left basilar opacity is new compared to  prior imaging. Findings concerning for pneumonia, advanced compared to prior  imaging. Cardiac silhouette within normal limits. Elongated thoracic aorta with  atherosclerosis. No pneumothorax. Small dependent left pleural effusion. XR ABD (KUB)    Narrative  XR ABD (KUB)    Comparison: None. There is a gastrostomy tube in the left upper quadrant. No dilated loops of  bowel. There is some increased stool throughout the colon and rectum consistent  with some constipation. There is no organomegaly. There are extensive arterial  calcifications in the abdominal aorta and some branch vessels. Bony structures  are unremarkable. Impression  No acute process. Constipation. Atherosclerosis. XR CHEST PORT    Narrative  XR CHEST PORT    Comparison:  12/8/2021.     Single view:  Increased patchy bilateral airspace disease with lower lung  predominance suspicious for pneumonia. No pneumothorax or pleural effusion  apparent. The heart size is normal. Calcified plaque noted in the thoracic  aorta. Impression  Suspect worsening pneumonia. Results from [place] encounter on 12/08/21    CTA CHEST W OR W WO CONT    Narrative  CTA chest with intravenous contrast, 80 cc Isovue-370, 3-D MIP images    Dose reduction: All CT scans at this facility are performed using dose reduction  optimization techniques as appropriate to a performed exam including the  following: Automated exposure control, adjustments of the mA and/or kV according  to patient size, or use of iterative reconstruction technique. INDICATION: Hypoxic    COMPARISON: 11/9/2021    FINDINGS:    No pulmonary emboli are identified. No aneurysm or dissection of thoracic aorta. Atherosclerosis including coronary arteries. Slightly prominent mediastinal and  hilar nodes may be reactive. No pleural or pericardial effusions. Partially  visualized left renal lesions are too small to characterize, may represent cysts  or other. Emphysematous changes patchy bilateral opacities in the lungs. Mild  peribronchial thickening. Areas of mild mucus plugging. No pneumothorax. No  acute fracture in the visualized bony structures. Degenerative changes  visualized upper lumbar spine. Impression  No pulmonary emboli. Patchy opacities in the lungs may be due to atelectasis, pneumonia or a  combination of these. Pulmonary emphysema. Atherosclerosis including coronary  arteries. Follow-up as clinically indicated. Please see full report. Discussion: Worsening infiltrate on chest x-ray may represent some congestion from IV fluid administration. She has been given 1 dose of Lasix will discontinue IV fluids continue her tube feedings. She has Covid 19 antigen positive with pneumonitis consistent with Covid is on Remdesivir Decadron and baricitinib would continue them.   She has a chronic PEG tube in place for tube feedings for failure to thrive appears very poorly nutrition with protein wasting. She has significant wheezing is getting as needed albuterol and averaging only twice a day will place it every 6 hours and add Symbicort which she was on at home. She is on Decadron which will give her steroids for her COPD as well  12/13 may be slightly improved FiO2 has been dropped to 35%. Urine output not recorded yesterday but she states she did have significant diuresis after Lasix. Creatinine stable we will repeat Lasix today.   Phosphorus is low to be repleted  ·     Natacha Prabhakar MD

## 2021-12-15 NOTE — PROGRESS NOTES
OCCUPATIONAL THERAPY EVALUATION  Patient: Chayo Campoverde (25 y.o. female)  Date: 12/15/2021  Primary Diagnosis: Pneumonia due to COVID-19 virus [U07.1, J12.82]  Atrial fibrillation with RVR ([address] [I48.91]  New onset atrial fibrillation ([address] [I48.91]        Precautions: fall risk, COVID (+)       ASSESSMENT  Pt is a 76 y/o F with PMH with PMH of COPD and HTN, presenting to [facility] with c/o SOB and generalized weakness; found to be (+) for COVID-19 with reportedly multiple family members also positive. CTA (-) for PE. Pt also found to have new rapid atrial fibrillation. Pt admitted 12/8/21 and being treated for PNA due to COVID-19 virus, atrial fibrillation with RVR. Pt received semi-supine in bed upon arrival, AXO x4 with cues for year, and agreeable to OT/PT evaluations at this time. Per pt report, pt lives with daughter (has 24/7 care) in a one-story home with 3-4 MANUEL and B HR, was MI with self cares (daughter assisting with bathing) and ambulatory with [facility] at [facility]. Pt states she does not wear O2 at home, currently on 12L/min via ventimask (FiO2 35%). Other DME owned includes: shower chair. Based on current observations, pt presents with deficits in generalized strength/AROM, dynamic sitting balance, static/dynamic standing balance, and functional activity tolerance impacting overall performance of ADLs and functional transfers/mobility. Pt currently requires SBA for bed mobility, setup/SBA donning socks seated EOB and CGA STS transfers to/from bed to [facility] for simulated toileting; SPO2 maintained 93-36% with activity. OOB mobility limited at this time 2' to short O2 cord, however pt able to take a few steps at bedside (see PT note for gait details) and return to supine SBA at end of evaluation. Overall, pt tolerates session fair.  Per RN, pt was attempted to be weaned from ventimask 35%/12L to ventimask 28%/6L this morning and patient tolerated in bed for approx 10 mins with O2 96% however with c/o SOB/fatigue therefore RN returned settings. Pt would benefit from continued skilled OT services to address current impairments and improve IND and safety with self cares and functional transfers/mobility. At this time, OT recommending d/c to [facility] vs. [address] with family care pending respiratory status and tolerance to therapy. Will continue to assess. Other factors to consider for discharge: family support, DME, time since onset, severity of deficits      Patient will benefit from skilled therapy intervention to address the above noted impairments. PLAN :  Recommendations and Planned Interventions: self care training, functional mobility training, therapeutic exercise, visual/perceptual training, therapeutic activities, endurance activities, patient education, and family training/education    Frequency/Duration: Patient will be followed by occupational therapy:  3-5x/week to address goals. Recommendation for discharge: (in order for the patient to meet his/her long term goals)  IR[facility] vs. [address] pending respiratory status and tolerance to therapy    This discharge recommendation:  Has been made in collaboration with the attending provider and/or case management       SUBJECTIVE:   Patient stated I have been getting to the bedside commode.     OBJECTIVE DATA SUMMARY:   HISTORY:   Past Medical History:   Diagnosis Date    COPD (chronic obstructive pulmonary disease) ([facility] [address]     Essential hypertension     Family history of skin cancer     Osteoporosis     Tobacco abuse      Past Surgical History:   Procedure Laterality Date    HX MOHS PROCEDURES  02/28/2018    BCC L lateral forehead by Dr. Manda Henriquez        Expanded or extensive additional review of patient history:     Home Situation  Home Environment: Private residence  # Steps to Enter: 4 (pt states 3-4 MANUEL)  Rails to Enter: Yes  Hand Rails : Bilateral  One/Two Story Residence: One story  Living Alone: No  Support Systems: Child(frank) (lives with daughter 24/7)  Patient Expects to be Discharged to[de-identified] Anthony  Current DME Used/Available at Home: tiffanie Chow,Shower chair    EXAMINATION OF PERFORMANCE DEFICITS:  Cognitive/Behavioral Status:  Neurologic State: Alert  Orientation Level: Oriented X4 (cues required for year)  Cognition: Follows commands       Hearing: Auditory  Auditory Impairment: None    Range of Motion:  AROM: Generally decreased, functional     Strength:  Strength: Generally decreased, functional     Coordination:  Coordination: Generally decreased, functional  Fine Motor Skills-Upper: Left Intact; Right Intact    Gross Motor Skills-Upper: Left Intact; Right Intact    Balance:  Sitting: Intact; With support  Standing: Impaired; Without support  Standing - Static: Fair;Constant support  Standing - Dynamic : Fair;Constant support    Functional Mobility and Transfers for ADLs:  Bed Mobility:  Rolling: Stand-by assistance  Supine to Sit: Stand-by assistance  Sit to Supine: Stand-by assistance  Scooting: Stand-by assistance    Transfers:  Sit to Stand: Contact guard assistance  Stand to Sit: Contact guard assistance  Toilet Transfer : Contact guard assistance    ADL Intervention and task modifications:  Lower Body Dressing Assistance  Socks: Set-up; Stand-by assistance  Position Performed: Seated edge of bed    Therapeutic Exercise:  Pt would benefit from UE HEP to improve overall UE AROM/strength and can be further educated in next treatment session. Functional Measure:    [facility] AM-PACTM \"6 Clicks\"                                                       Daily Activity Inpatient Short Form  How much help from another person does the patient currently need. .. Total; A Lot A Little None   1. Putting on and taking off regular lower body clothing? []  1 []  2 [x]  3 []  4   2. Bathing (including washing, rinsing, drying)? []  1 []  2 [x]  3 []  4   3. Toileting, which includes using toilet, bedpan or urinal? [] 1 []  2 [x]  3 []  4   4.   Putting on and taking off regular upper body clothing? []  1 []  2 [x]  3 []  4   5. Taking care of personal grooming such as brushing teeth? []  1 []  2 [x]  3 []  4   6. Eating meals? []  1 []  2 []  3 [x]  4   © , Trustees of [facility], under license to [employer]. All rights reserved     Score: 24     Interpretation of Tool:  Represents clinically-significant functional categories (i.e. Activities of daily living). Percentage of Impairment CH    0%   CI    1-19% CJ    20-39% CK    40-59% CL    60-79% CM    80-99% CN     100%   [facility]  Score 6-24 24 23 20-22 15-19 10-14 7-9 6         Occupational Therapy Evaluation Charge Determination   History Examination Decision-Making   LOW Complexity : Brief history review  LOW Complexity : 1-3 performance deficits relating to physical, cognitive , or psychosocial skils that result in activity limitations and / or participation restrictions  MEDIUM Complexity : Patient may present with comorbidities that affect occupational performnce. Miniml to moderate modification of tasks or assistance (eg, physical or verbal ) with assesment(s) is necessary to enable patient to complete evaluation       Based on the above components, the patient evaluation is determined to be of the following complexity level: LOW   Pain Ratin/10 back    Activity Tolerance:   Fair, requires rest breaks, and observed SOB with activity    After treatment patient left in no apparent distress:    Supine in bed and Call bell within reach, side rails x4 per patient request     COMMUNICATION/EDUCATION:   The patients plan of care was discussed with: Physical therapist and Registered nurse. Patient/family have participated as able in goal setting and plan of care. and Patient/family agree to work toward stated goals and plan of care. This patients plan of care is appropriate for delegation to [facility].     OT/PT sessions occurred together for increased patient and clinician safety    Thank you for this referral.  Marycarmen Casas  Time Calculation: 32 mins    Problem: Self Care Deficits Care Plan (Adult)  Goal: *Acute Goals and Plan of Care (Insert Text)  Description: Pt will be IND sup <> sit in prep for EOB ADLs  Pt will be Mod I grooming standing sink side LRAD  Pt will be Mod I LE dressing sitting EOB/long sit  Pt will be Mod I sit <>  prep for toileting LRAD  Pt will be Mod I toileting/toilet transfer/cloth mgmt LRAD  Pt will be IND following UE HEP in prep for self care tasks    Outcome: Not Met

## 2021-12-15 NOTE — PROGRESS NOTES
Nutrition Assessment     Type and Reason for Visit: Reassess (goal)    Nutrition Recommendations/Plan:   Continue w/ current TF  Order provides 1410kcal (101%), 60g pro (103%), and 1215ml (101%)  If pt would like PO for pleasure, rec'd SLP consult     Monitor and Records wts, TF formula, TF rate, flushes, GRV's    Nutrition Assessment:  COVID +. On 3L NC. On last admit, 11/14, pt received PEG tube and tolerated bolus feeds, with Jevity 1.5, prior to d/c. Pt now readmitted. Started TF x 6days, noted tolerating well w/o complications. Previously on hold for kub- resumed (12/14). Noted positive wt. gain 1lb (1.7%) x6days. Malnutrition Assessment:  Malnutrition Status: Severe malnutrition     Estimated Daily Nutrient Needs:  Energy (kcal):  1400kcal (35kcal/kg)  Protein (g):  58g (1.5g/kg)       Fluid (ml/day):  1200ml (30ml/kg)    Nutrition Related Findings:  NFPE not performed 2/2 COVID precautions. NFPE from 11/14 showing severe wasting. C/s issues, so PEG to abd. BM (12/13). Nutrition related labs: Na (131), glucose (134), BUN (31), alb (2.2). Meds: vitamin C, Vitamin D, docusate. Current Nutrition Therapies:  DIET NPO  ADULT TUBE FEEDING PEG; Standard with Fiber; Delivery Method: Bolus; Bolus Frequency: 4 Times Daily; Bolus Volume (mL): 235; Water Flush Volume (mL): 125; Water Flush Frequency: Q 6 hours    Anthropometric Measures:  · Height:  5' 5\" (165.1 cm)  · Current Body Wt:  39.2 kg (86 lb 6.7 oz)  · BMI: 14.4    Nutrition Diagnosis:   · Increased nutrient needs related to increased demand for energy/nutrients as evidenced by nutrition support-enteral nutrition,BMI,severe muscle loss,severe loss of subcutaneous fat      Nutrition Intervention:  Food and/or Nutrient Delivery: Continue tube feeding,Continue NPO  Nutrition Education and Counseling: Education not indicated  Coordination of Nutrition Care: Continue to monitor while inpatient    Goals:  Pt to meet 100% of EEN via PEG x 7days.  Wt gain 0.5kg per week       Nutrition Monitoring and Evaluation:   Behavioral-Environmental Outcomes: None identified  Food/Nutrient Intake Outcomes: Enteral nutrition intake/tolerance  Physical Signs/Symptoms Outcomes: GI status,Biochemical data,Weight    Discharge Planning:    Enteral nutrition     Electronically signed by Tashi Jimenez on 12/15/2021 at 1:32 PM    Contact Number: [phone]

## 2021-12-15 NOTE — PROGRESS NOTES
PHYSICAL THERAPY EVALUATION  Patient: Dennis Kelley (71 y.o. female)  Date: 12/15/2021  Primary Diagnosis: Pneumonia due to COVID-19 virus [U07.1, J12.82]  Atrial fibrillation with RVR ([facility] [address] [I48.91]  New onset atrial fibrillation [facility]) [I48.91]        Precautions: covid-19 precautions/droplet plus precautions       ASSESSMENT  Pt is a 76 yo female admitted on 12/8/2021 for SOB, cough, generalized weakness. PMH: covid-19. HTN, COPD, osteoporosis, a-fib with RVR. Pt A&O x 4, oriented to month by not year. Per pt, pt resides with daughter 24/7 in a 1 story home with 3-4 MANUEL, adelfo handrails, pt was mod I for ADLS/IADLS, daughter helped with bathing. Rolling walker and shower chair AD with mobility prior to admission. Pt states she was not using O2 at home. Based on the objective data described below, the patient presents with generalized weakness, impaired functional mobility, impaired amb, impaired balance, and decreased activity tolerance/endurance. Pt semi-supine upon PT/OT arrival, agreeable to evaluation. Pt wearing venti-mask with 12 L O2. Pt required SBA for bed mobility, SBA supine <> sit, CGA sit <> stand transfers. Pt amb 2 steps forward/back by EOB with HHA/CGA with gt belt, did stand pivot from bed to [facility] with CGA. Pt did good with session today with SpO2 ranging from 93-96% throughout session. Pt will benefit from continued skilled PT to address above deficits and return to PLOF. Current PT DC recommendation IRF vs HHPT pending respiratory status.     Current Level of Function Impacting Discharge: respiratory status    Other factors to consider for discharge: respiratory status, family support, any DME needs      PLAN :  Recommendations and Planned Interventions: bed mobility training, transfer training, gait training, therapeutic exercises, patient and family training/education, and therapeutic activities      Recommend with staff: pulse ox    Frequency/Duration: Patient will be followed by physical therapy:  3-5x/week to address goals. Recommendation for discharge: (in order for the patient to meet his/her long term goals)  To Be Determined IRF vs HHPT pending respiratory status    This discharge recommendation:  Has not yet been discussed the attending provider and/or case management    IF patient discharges home will need the following DME: to be determined (TBD)         SUBJECTIVE:   Patient stated my mask keeps falling off.  (venti-mask)    OBJECTIVE DATA SUMMARY:   HISTORY:    Past Medical History:   Diagnosis Date    COPD (chronic obstructive pulmonary disease) ([facility] [address]     Essential hypertension     Family history of skin cancer     Osteoporosis     Tobacco abuse      Past Surgical History:   Procedure Laterality Date    HX MOHS PROCEDURES  02/28/2018    BCC L lateral forehead by Dr. Garibay Reap: Private residence  # Steps to Enter: 4 (pt states 3-4 MANUEL)  Rails to Enter: Yes  Hand Rails : Bilateral  One/Two Story Residence: One story  Living Alone: No  Support Systems: Child(frank) (lives with daughter 24/7)  Patient Expects to be Discharged to[de-identified] House  Current DME Used/Available at Home: tiffanie Killian,Shower chair    EXAMINATION/PRESENTATION/DECISION MAKING:   Critical Behavior:  Neurologic State: Alert  Orientation Level: Oriented X4 (cues required for year)  Cognition: Follows commands     Hearing: Auditory  Auditory Impairment: None  Range Of Motion:  AROM: Generally decreased, functional     Strength:    Strength: Generally decreased, functional       Functional Mobility:  Bed Mobility:  Rolling: Stand-by assistance  Supine to Sit: Stand-by assistance  Sit to Supine: Stand-by assistance  Scooting: Stand-by assistance  Transfers:  Sit to Stand: Contact guard assistance  Stand to Sit: Contact guard assistance  Stand Pivot Transfers: Contact guard assistance (from bed to [facility])         Balance:   Sitting: Intact; With support  Standing: Impaired; Without support  Standing - Static: Fair;Constant support  Standing - Dynamic : Fair;Constant support  Ambulation/Gait Training:  Gait Description (WDL): Exceptions to WDL     Step Length: Left shortened;Right shortened (took 2 steps forward/back by EOB with HHA/CGA)    Therapeutic Exercises: Ankle pumps adelfo LE  Heel slides adelfo LE    Functional Measure:  [address] [place] [id number] AM-PAC 6 Clicks         Basic Mobility Inpatient Short Form  How much difficulty does the patient currently have. .. Unable A Lot A Little None   1. Turning over in bed (including adjusting bedclothes, sheets and blankets)? [] 1   [] 2   [] 3   [x] 4   2. Sitting down on and standing up from a chair with arms ( e.g., wheelchair, bedside commode, etc.)   [] 1   [] 2   [x] 3   [] 4   3. Moving from lying on back to sitting on the side of the bed? [] 1   [] 2   [] 3   [x] 4          How much help from another person does the patient currently need. .. Total A Lot A Little None   4. Moving to and from a bed to a chair (including a wheelchair)? [] 1   [] 2   [x] 3   [] 4   5. Need to walk in hospital room? [] 1   [] 2   [x] 3   [] 4   6. Climbing 3-5 steps with a railing? [] 1   [] 2   [x] 3   [] 4   © 2007, Trustees of [address] [place], [place] [id number], under license to [employer]. All rights reserved     Score:  Initial: 20/24 Most Recent: 20/24 (Date: 12/15/21 )   Interpretation of Tool:  Represents activities that are increasingly more difficult (i.e. Bed mobility, Transfers, Gait).   Score 24 23 22-20 19-15 14-10 9-7 6   Modifier CH CI CJ CK CL CM CN         Physical Therapy Evaluation Charge Determination   History Examination Presentation Decision-Making   HIGH Complexity :3+ comorbidities / personal factors will impact the outcome/ POC  HIGH Complexity : 4+ Standardized tests and measures addressing body structure, function, activity limitation and / or participation in recreation  MEDIUM Complexity : Evolving with changing characteristics  Other outcome measures [facility] 6   initial score      Based on the above components, the patient evaluation is determined to be of the following complexity level: MEDIUM    Pain Ratin/10 Pt reports back pain    Activity Tolerance:   Good, desaturates with exertion and requires oxygen, and requires rest breaks. SpO2 ranged from 93%-96% during session. After treatment patient left in no apparent distress:   Supine in bed, Call bell within reach, and rolled towel between legs  and NSG updated. GOALS:    Problem: Mobility Impaired (Adult and Pediatric)  Goal: *Acute Goals and Plan of Care (Insert Text)  Description: Pt will be I with LE HEP in 7 days. Pt will perform bed mobility with mod I in 7 days. Pt will perform transfers with mod I in 7 days. Pt will independently verbalize energy conservation techniques such as pursed lip breathing in 7 days. Outcome: Not Met       COMMUNICATION/EDUCATION:   The patients plan of care was discussed with:  not yet discussed with CM . Patient/family agree to work toward stated goals and plan of care.       Thank you for this referral.  Beth Velazquez, PT, PT, DPT   Time Calculation: 32 mins

## 2021-12-15 NOTE — PROGRESS NOTES
Attempted to titrate o2 from ventimask 35%/12L to ventimask 28%/6L- Patient tolerated in bed for approx 10mins. Patient reported shortness of breath even with 02sat at 96%. Changed back to original 02 settings. Problem: Risk for Spread of Infection  Goal: Prevent transmission of infectious organism to others  Description: Prevent the transmission of infectious organisms to other patients, staff members, and visitors.   Outcome: Progressing Towards Goal     Problem: Patient Education:  Go to Education Activity  Goal: Patient/Family Education  Outcome: Progressing Towards Goal     Problem: Airway Clearance - Ineffective  Goal: Achieve or maintain patent airway  Outcome: Progressing Towards Goal     Problem: Gas Exchange - Impaired  Goal: Absence of hypoxia  Outcome: Progressing Towards Goal  Goal: Promote optimal lung function  Outcome: Progressing Towards Goal

## 2021-12-15 NOTE — PROGRESS NOTES
Hospitalist Progress Note               Daily Progress Note: 12/15/2021      Subjective: The patient is seen for follow up. She is a 73-year-old female with history of COPD and hypertension, brought to the ED overnight with shortness of breath and generalized weakness. She has had cough as well. Arrival she had oxygen saturations in the 80s on room air. She tested positive for COVID-19. Multiple family members are also positive. CTA was negative for PE and reports patchy opacities, pna or atelectasis. She was also found to have rapid atrial fibrillation which is new for her  Patient is unvaccinated for Covid. She had been sick for 2 or 3 days  Patient was admitted, started on Remdesivir and steroids along with baricitinib  -------    Patient seen today for follow-up. She states she's better  Remains on ventimask, spo2 currently 96%  States she feels some improvement. Not much change today with o2 requirement though she says she is improving. C/o pain at g tube site otherwise no pain, nausea or vomiting. CXr 12/11 appears worse pna    Tf resumed  Mariann gtube site care    She is alert and pleasant. She is tolerating peg feeds reportedly- on hold transiently awaiting kub which reported no acute process other than constipation.   SR         Problem List:  Problem List as of 12/15/2021 Date Reviewed: 11/12/2021          Codes Class Noted - Resolved    New onset atrial fibrillation ([facility] [address] ICD-10-CM: I48.91  ICD-9-CM: 427.31  12/8/2021 - Present        Atrial fibrillation with RVR ([facility] [address] ICD-10-CM: I48.91  ICD-9-CM: 427.31  12/8/2021 - Present        Pneumonia due to COVID-19 virus ICD-10-CM: U07.1, J12.82  ICD-9-CM: 480.8, 079.89  12/8/2021 - Present        Dysphagia ICD-10-CM: R13.10  ICD-9-CM: 787.20  11/10/2021 - Present        Failure to thrive (child) ICD-10-CM: R62.51  ICD-9-CM: 783.41  11/10/2021 - Present        Severe protein-calorie malnutrition ([facility] [address] ICD-10-CM: I86  ICD-9-CM: 262  11/10/2021 - Present        Unintentional weight loss ICD-10-CM: R63.4  ICD-9-CM: 783.21  11/10/2021 - Present        COPD with acute exacerbation (HCC) ICD-10-CM: J44.1  ICD-9-CM: 491.21  11/9/2021 - Present        Acute respiratory failure with hypoxia [facility]) ICD-10-CM: J96.01  ICD-9-CM: 518.81  11/9/2021 - Present        Hypertension ICD-10-CM: I10  ICD-9-CM: 401.9  11/9/2021 - Present        Tobacco abuse ICD-10-CM: Z72.0  ICD-9-CM: 305.1  11/9/2021 - Present        Osteoporosis ICD-10-CM: M81.0  ICD-9-CM: 733.00  11/9/2021 - Present        Community acquired pneumonia ICD-10-CM: J18.9  ICD-9-CM: 208  11/9/2021 - Present        Bronchiectasis ([address] ICD-10-CM: J47.9  ICD-9-CM: 494.0  11/9/2021 - Present              Medications reviewed  Current Facility-Administered Medications   Medication Dose Route Frequency    bisacodyL (DULCOLAX) suppository 10 mg  10 mg Rectal DAILY PRN    docusate (COLACE) 50 mg/5 mL oral liquid 100 mg  100 mg Per G Tube BID    ascorbic acid (vitamin C) (VITAMIN C) tablet 500 mg  500 mg Per G Tube BID    cholecalciferol (VITAMIN D3) (1000 Units /25 mcg) tablet 2,000 Units  2,000 Units Oral DAILY    guaiFENesin ER (MUCINEX) tablet 600 mg  600 mg Oral BID    famotidine (PEPCID) tablet 20 mg  20 mg Oral BID    albuterol (PROVENTIL HFA, VENTOLIN HFA, PROAIR HFA) inhaler 2 Puff  2 Puff Inhalation Q6H RT    budesonide-formoteroL (SYMBICORT) 160-4.5 mcg/actuation HFA inhaler 2 Puff  2 Puff Inhalation BID RT    piperacillin-tazobactam (ZOSYN) 3.375 g in 0.9% sodium chloride (MBP/ADV) 100 mL MBP  3.375 g IntraVENous Q8H    hydrOXYzine pamoate (VISTARIL) capsule 25 mg  25 mg Per G Tube Q6H PRN    guaiFENesin-codeine (ROBITUSSIN AC) 100-10 mg/5 mL solution 5 mL  5 mL Per G Tube Q6H PRN    dextromethorphan (DELSYM) 30 mg/5 mL syrup 30 mg  30 mg Per G Tube Q12H    lisinopriL (PRINIVIL, ZESTRIL) tablet 5 mg  5 mg Oral DAILY    metoprolol tartrate (LOPRESSOR) tablet 50 mg  50 mg Oral BID    sodium chloride (NS) flush 5-40 mL  5-40 mL IntraVENous Q8H    sodium chloride (NS) flush 5-40 mL  5-40 mL IntraVENous PRN    acetaminophen (TYLENOL) tablet 650 mg  650 mg Oral Q6H PRN    Or    acetaminophen (TYLENOL) suppository 650 mg  650 mg Rectal Q6H PRN    polyethylene glycol (MIRALAX) packet 17 g  17 g Oral DAILY PRN    ondansetron (ZOFRAN ODT) tablet 4 mg  4 mg Oral Q8H PRN    Or    ondansetron (ZOFRAN) injection 4 mg  4 mg IntraVENous Q6H PRN    dexamethasone (PF) (DECADRON) 10 mg/mL injection 6 mg  6 mg IntraVENous Q12H    baricitinib (OLUMIANT) tablet 4 mg  4 mg Oral DAILY    dilTIAZem ER (CARDIZEM CD) capsule 120 mg  120 mg Oral DAILY    enoxaparin (LOVENOX) injection 40 mg  1 mg/kg SubCUTAneous Q12H       Review of Systems:   A comprehensive review of systems was negative except for that written in the HPI. Objective:   Physical Exam:     Visit Vitals  BP (!) 152/60   Pulse 84   Temp 98.2 °F (36.8 °C)   Resp 20   Ht 5' 5\" (1.651 m)   Wt 39.2 kg (86 lb 6.7 oz)   SpO2 95%   Breastfeeding No   BMI 14.38 kg/m²    O2 Flow Rate (L/min): 12 l/min O2 Device: Ventimask    Temp (24hrs), Av.9 °F (36.6 °C), Min:97.5 °F (36.4 °C), Max:98.2 °F (36.8 °C)    12/15 0701 - 12/15 1900  In: 350   Out: -    1901 - 12/15 07  In: 760   Out: -     General:   Awake and alert   Lungs: Few rhonchi bilateral bases, no wheeze, not labored   Chest wall:  No tenderness or deformity. Heart:  Regular rate and rhythm, S1, S2 normal, no murmur, click, rub or gallop. Abdomen:   Soft, non-tender. Bowel sounds normal. No masses,  No organomegaly. Peg site dressing adhered to site, mildly erythematous, not purulent . Extremities: Extremities normal, atraumatic, no cyanosis   Pulses: 2+ and symmetric all extremities. Skin: Warm, dry, poor turgor. Neurologic: CNII-XII intact.   No gross focal deficits         Data Review:       Recent Days:  Recent Labs     21  0733 21  0916   WBC 16.7* 20.6*   HGB 10.5* 11.6   HCT 30.5* 34.7*   * 482*     Recent Labs     12/14/21  0733 12/13/21  0916   * 132*   K 4.7 4.5   CL 96* 99   CO2 28 25   * 108*   BUN 31* 35*   CREA 0.67 0.56   CA 8.2* 8.9   MG  --  2.3   PHOS 3.6 2.0*   ALB 2.2* 2.7*     No results for input(s): PH, PCO2, PO2, HCO3, FIO2 in the last 72 hours. 24 Hour Results:  No results found for this or any previous visit (from the past 24 hour(s)). XR CHEST PORT   Final Result      XR ABD (KUB)   Final Result   No acute process. Constipation. Atherosclerosis. XR CHEST PORT   Final Result   Suspect worsening pneumonia. CTA CHEST W OR W WO CONT   Final Result      No pulmonary emboli. Patchy opacities in the lungs may be due to atelectasis, pneumonia or a   combination of these. Pulmonary emphysema. Atherosclerosis including coronary   arteries. Follow-up as clinically indicated. Please see full report. XR CHEST PORT   Final Result      Pulmonary emphysema. Opacities in the lungs may represent atelectasis, pneumonia   or a combination of these. Follow-up as clinically indicated. Assessment:  COVID-19 with pneumonitis, o2 requirements increase yesterday, remains on ventimask, cxr 12/11 suggesting worse pna, aspiration? abx switched to zosyn. Procal descending. Acute respiratory failure with hypoxia, onc o2 with ventimask maintaining spo2>93%    COPD with exacerbation    Atrial fibrillation with RVR, now back and remains in sinus rhythm    Dehydration with hyponatremia, improving volume status, improved to 132    Hypertension    Chronic PEG tube feedings due to history of dysphagia, irritation surrounding site    Constipation    Plan:  Continue Remdesivir, dexamethasone,  and baricitinib  Continue Jevity tube feeds   zosyn, preumtive aspiration  Aspiration precautions.   Antitussive  Prn inh albuterol  Wean o2 as tolerated  In lieu of increased o2 requirements pulm cx'd   symbicort + scheduled albuterol  Lasix rn, may be volume component,   Bowel regimen      Vtep: lovenox  Gip:pepcid  Full code  Dispo: pending course , >48, I believe hhc is preference, if needs snf will not need auth, pt/ot    Care Plan discussed with: Patient/Family    Disposition: Continued inpatient care    Total time spent with patient: 40 minutes.     Salo Jenkins MD

## 2021-12-16 NOTE — PROGRESS NOTES
Hospitalist Progress Note               Daily Progress Note: 12/16/2021      Subjective: The patient is seen for follow up. She is a 71-year-old female with history of COPD and hypertension, brought to the ED overnight with shortness of breath and generalized weakness. She has had cough as well. Arrival she had oxygen saturations in the 80s on room air. She tested positive for COVID-19. Multiple family members are also positive. CTA was negative for PE and reports patchy opacities, pna or atelectasis. She was also found to have rapid atrial fibrillation which is new for her  Patient is unvaccinated for Covid. She had been sick for 2 or 3 days  Patient was admitted, started on Remdesivir and steroids along with baricitinib  -------    Patient seen today for follow-up. She states she's better  Remains on ventimask, spo2 currently 96%  States she feels some improvement. Not much change today with o2 requirement though she says she is improving. C/o pain at g tube site otherwise no pain, nausea or vomiting. CXr 12/11 appears worse pna    Tf resumed  Mariann gtube site care    She is alert and pleasant. She is tolerating peg feeds reportedly- on hold transiently awaiting kub which reported no acute process other than constipation.   SR         Problem List:  Problem List as of 12/16/2021 Date Reviewed: 11/12/2021          Codes Class Noted - Resolved    New onset atrial fibrillation ([facility] ICD-10-CM: I48.91  ICD-9-CM: 427.31  12/8/2021 - Present        Atrial fibrillation with RVR ([facility] ICD-10-CM: I48.91  ICD-9-CM: 427.31  12/8/2021 - Present        Pneumonia due to COVID-19 virus ICD-10-CM: U07.1, J12.82  ICD-9-CM: 480.8, 079.89  12/8/2021 - Present        Dysphagia ICD-10-CM: R13.10  ICD-9-CM: 787.20  11/10/2021 - Present        Failure to thrive (child) ICD-10-CM: R62.51  ICD-9-CM: 783.41  11/10/2021 - Present        Severe protein-calorie malnutrition ([facility] ICD-10-CM: C15  ICD-9-CM: 262  11/10/2021 - Present        Unintentional weight loss ICD-10-CM: R63.4  ICD-9-CM: 783.21  11/10/2021 - Present        COPD with acute exacerbation (HCC) ICD-10-CM: J44.1  ICD-9-CM: 491.21  11/9/2021 - Present        Acute respiratory failure with hypoxia [facility]) ICD-10-CM: J96.01  ICD-9-CM: 518.81  11/9/2021 - Present        Hypertension ICD-10-CM: I10  ICD-9-CM: 401.9  11/9/2021 - Present        Tobacco abuse ICD-10-CM: Z72.0  ICD-9-CM: 305.1  11/9/2021 - Present        Osteoporosis ICD-10-CM: M81.0  ICD-9-CM: 733.00  11/9/2021 - Present        Community acquired pneumonia ICD-10-CM: J18.9  ICD-9-CM: 407  11/9/2021 - Present        Bronchiectasis ([address] ICD-10-CM: J47.9  ICD-9-CM: 494.0  11/9/2021 - Present              Medications reviewed  Current Facility-Administered Medications   Medication Dose Route Frequency    ALPRAZolam (XANAX) tablet 0.5 mg  0.5 mg Oral Q6H PRN    bisacodyL (DULCOLAX) suppository 10 mg  10 mg Rectal DAILY PRN    docusate (COLACE) 50 mg/5 mL oral liquid 100 mg  100 mg Per G Tube BID    ascorbic acid (vitamin C) (VITAMIN C) tablet 500 mg  500 mg Per G Tube BID    cholecalciferol (VITAMIN D3) (1000 Units /25 mcg) tablet 2,000 Units  2,000 Units Oral DAILY    guaiFENesin ER (MUCINEX) tablet 600 mg  600 mg Oral BID    famotidine (PEPCID) tablet 20 mg  20 mg Oral BID    albuterol (PROVENTIL HFA, VENTOLIN HFA, PROAIR HFA) inhaler 2 Puff  2 Puff Inhalation Q6H RT    budesonide-formoteroL (SYMBICORT) 160-4.5 mcg/actuation HFA inhaler 2 Puff  2 Puff Inhalation BID RT    piperacillin-tazobactam (ZOSYN) 3.375 g in 0.9% sodium chloride (MBP/ADV) 100 mL MBP  3.375 g IntraVENous Q8H    hydrOXYzine pamoate (VISTARIL) capsule 25 mg  25 mg Per G Tube Q6H PRN    guaiFENesin-codeine (ROBITUSSIN AC) 100-10 mg/5 mL solution 5 mL  5 mL Per G Tube Q6H PRN    dextromethorphan (DELSYM) 30 mg/5 mL syrup 30 mg  30 mg Per G Tube Q12H    lisinopriL (PRINIVIL, ZESTRIL) tablet 5 mg  5 mg Oral DAILY    metoprolol tartrate (LOPRESSOR) tablet 50 mg  50 mg Oral BID    sodium chloride (NS) flush 5-40 mL  5-40 mL IntraVENous Q8H    sodium chloride (NS) flush 5-40 mL  5-40 mL IntraVENous PRN    acetaminophen (TYLENOL) tablet 650 mg  650 mg Oral Q6H PRN    Or    acetaminophen (TYLENOL) suppository 650 mg  650 mg Rectal Q6H PRN    polyethylene glycol (MIRALAX) packet 17 g  17 g Oral DAILY PRN    ondansetron (ZOFRAN ODT) tablet 4 mg  4 mg Oral Q8H PRN    Or    ondansetron (ZOFRAN) injection 4 mg  4 mg IntraVENous Q6H PRN    dexamethasone (PF) (DECADRON) 10 mg/mL injection 6 mg  6 mg IntraVENous Q12H    baricitinib (OLUMIANT) tablet 4 mg  4 mg Oral DAILY    dilTIAZem ER (CARDIZEM CD) capsule 120 mg  120 mg Oral DAILY    enoxaparin (LOVENOX) injection 40 mg  1 mg/kg SubCUTAneous Q12H       Review of Systems:   A comprehensive review of systems was negative except for that written in the HPI. Objective:   Physical Exam:     Visit Vitals  BP (!) 147/66 (BP 1 Location: Left upper arm, BP Patient Position: At rest)   Pulse 69   Temp 97.6 °F (36.4 °C)   Resp 16   Ht 5' 5\" (1.651 m)   Wt 39.2 kg (86 lb 6.7 oz)   SpO2 96%   Breastfeeding No   BMI 14.38 kg/m²    O2 Flow Rate (L/min): 12 l/min O2 Device: Venturi-mask    Temp (24hrs), Av.7 °F (36.5 °C), Min:97.6 °F (36.4 °C), Max:97.7 °F (36.5 °C)    701 - 1900  In: 360   Out: -    1901 -  07  In: 710   Out: -     General:   Awake and alert   Lungs: Few rhonchi bilateral bases, no wheeze, not labored   Chest wall:  No tenderness or deformity. Heart:  Regular rate and rhythm, S1, S2 normal, no murmur, click, rub or gallop. Abdomen:   Soft, non-tender. Bowel sounds normal. No masses,  No organomegaly. Peg site dressing adhered to site, mildly erythematous, not purulent . Extremities: Extremities normal, atraumatic, no cyanosis   Pulses: 2+ and symmetric all extremities. Skin: Warm, dry, poor turgor. Neurologic: CNII-XII intact.   No gross focal deficits         Data Review:       Recent Days:  Recent Labs     12/14/21  0733   WBC 16.7*   HGB 10.5*   HCT 30.5*   *     Recent Labs     12/14/21  0733   *   K 4.7   CL 96*   CO2 28   *   BUN 31*   CREA 0.67   CA 8.2*   PHOS 3.6   ALB 2.2*     No results for input(s): PH, PCO2, PO2, HCO3, FIO2 in the last 72 hours. 24 Hour Results:  No results found for this or any previous visit (from the past 24 hour(s)). XR CHEST PORT   Final Result      XR ABD (KUB)   Final Result   No acute process. Constipation. Atherosclerosis. XR CHEST PORT   Final Result   Suspect worsening pneumonia. CTA CHEST W OR W WO CONT   Final Result      No pulmonary emboli. Patchy opacities in the lungs may be due to atelectasis, pneumonia or a   combination of these. Pulmonary emphysema. Atherosclerosis including coronary   arteries. Follow-up as clinically indicated. Please see full report. XR CHEST PORT   Final Result      Pulmonary emphysema. Opacities in the lungs may represent atelectasis, pneumonia   or a combination of these. Follow-up as clinically indicated. Assessment:  COVID-19 with pneumonitis, o2 requirements increase yesterday, remains on ventimask, cxr 12/11 suggesting worse pna, aspiration? abx switched to zosyn. Procal descending. Acute respiratory failure with hypoxia, onc o2 with ventimask maintaining spo2>93%    COPD with exacerbation    Atrial fibrillation with RVR, now back and remains in sinus rhythm    Dehydration with hyponatremia, improving volume status, improved to 132    Hypertension    Chronic PEG tube feedings due to history of dysphagia, irritation surrounding site    Constipation    Plan:  Continue Remdesivir, dexamethasone,  and baricitinib  Continue Jevity tube feeds   zosyn, preumtive aspiration  Aspiration precautions.   Antitussive  Prn inh albuterol  Wean o2 as tolerated  In lieu of increased o2 requirements pulm cx'd   symbicort + scheduled albuterol  Lasix rn, may be volume component,   Bowel regimen      Vtep: lovenox  Gip:pepcid  Full code  Dispo: pending course , >48, I believe hhc is preference, if needs snf will not need auth, pt/ot    Care Plan discussed with: Patient/Family    Disposition: Continued inpatient care    Total time spent with patient: 40 minutes.     Jenifer Mix MD

## 2021-12-16 NOTE — PROGRESS NOTES
CM reviewed chart and spoke to primary physician. Patient is still requiring ventimask for respiratory status. PT/OT recommending IRF vs HH depending on respiratory status. CM has updated HH and [employer]. Cm will continue to follow.

## 2021-12-16 NOTE — PROGRESS NOTES
Consult  Pulmonary, Critical Care    Name: Sandeep Barnard MRN: [id number]   : 1945 Hospital: [address] [place] [address]   Date: 2021  Admission date: 2021 Hospital Day: 9       Subjective/Interval History:   Patient seen on the medical floor. She is awake able to sit up and walk to the bedside commode she looks very weak and frail.  No particular complaints still looks very frail. FiO2 down to 35%    Hospital Problems  Date Reviewed: 2021          Codes Class Noted POA    New onset atrial fibrillation (HCC) ICD-10-CM: I48.91  ICD-9-CM: 427.31  2021 Unknown        Atrial fibrillation with RVR (HCC) ICD-10-CM: I48.91  ICD-9-CM: 427.31  2021 Unknown        Pneumonia due to COVID-19 virus ICD-10-CM: U07.1, J12.82  ICD-9-CM: 480.8, 079.89  2021 Unknown              IMPRESSION:   1. Acute hypoxic respiratory failure now on 2 liter nasal cannula  2. COVID-19 pneumonitis  3. Chronic PEG tube with feedings  4. Probable aspiration pneumonia more noticeable infiltrate left base  5. Worsening infiltrates in the bases possible causes include vascular congestion from IV fluids worsening Covid pneumonia mucous plugging from severe COPD or aspiration  6. Worsening leukocytosis questionably aspiration event  7. COPD with bronchospasm improved  8. Hypophosphatemia to be repleted  9. Atrial fibrillation converted to sinus  10. Malnutrition  Body mass index is 14.38 kg/m². RECOMMENDATIONS/PLAN:   1. Will continue to wean oxygen per protocol on nasal cannula  2. Continue  Decadron baricitinib has completed Remdesivir  3. Continue oxygen saturation to maintain above 90%   4. Continue Symbicort and albuterol on a set schedule  5. Continue tube feedings with water flushes  6.  Agree empiric IV Zosyn          [x] High complexity decision making was performed  [x] See my orders for details      Subjective/Initial History:     I was asked by Zulma Kussmaul, MD to see Isa Go Mara Santos  a 77 y.o.  female in consultation for a chief complaint of acute hypoxic respiratory failure Covid pneumonia worsening infiltrate    No Known Allergies     MAR reviewed and pertinent medications noted or modified as needed     Current Facility-Administered Medications   Medication    bisacodyL (DULCOLAX) suppository 10 mg    docusate (COLACE) 50 mg/5 mL oral liquid 100 mg    ascorbic acid (vitamin C) (VITAMIN C) tablet 500 mg    cholecalciferol (VITAMIN D3) (1000 Units /25 mcg) tablet 2,000 Units    guaiFENesin ER (MUCINEX) tablet 600 mg    famotidine (PEPCID) tablet 20 mg    albuterol (PROVENTIL HFA, VENTOLIN HFA, PROAIR HFA) inhaler 2 Puff    budesonide-formoteroL (SYMBICORT) 160-4.5 mcg/actuation HFA inhaler 2 Puff    piperacillin-tazobactam (ZOSYN) 3.375 g in 0.9% sodium chloride (MBP/ADV) 100 mL MBP    hydrOXYzine pamoate (VISTARIL) capsule 25 mg    guaiFENesin-codeine (ROBITUSSIN AC) 100-10 mg/5 mL solution 5 mL    dextromethorphan (DELSYM) 30 mg/5 mL syrup 30 mg    lisinopriL (PRINIVIL, ZESTRIL) tablet 5 mg    metoprolol tartrate (LOPRESSOR) tablet 50 mg    sodium chloride (NS) flush 5-40 mL    sodium chloride (NS) flush 5-40 mL    acetaminophen (TYLENOL) tablet 650 mg    Or    acetaminophen (TYLENOL) suppository 650 mg    polyethylene glycol (MIRALAX) packet 17 g    ondansetron (ZOFRAN ODT) tablet 4 mg    Or    ondansetron (ZOFRAN) injection 4 mg    dexamethasone (PF) (DECADRON) 10 mg/mL injection 6 mg    baricitinib (OLUMIANT) tablet 4 mg    dilTIAZem ER (CARDIZEM CD) capsule 120 mg    enoxaparin (LOVENOX) injection 40 mg      Patient PCP: Colleen Li MD  PMH:  has a past medical history of COPD (chronic obstructive pulmonary disease) ([address] Essential hypertension, Family history of skin cancer, Osteoporosis, and Tobacco abuse. PSH:   has a past surgical history that includes hx mohs procedure (02/28/2018).    FHX: family history includes Hypertension in her mother. SHX:  reports that she has been smoking. She started smoking about 60 years ago. She has a 60.00 pack-year smoking history. She has never used smokeless tobacco.  Systemic review not reliable from her she does carry on a conversation but she contradicts herself several times while I am talking with her    Objective:     Vital Signs: Telemetry:    normal sinus rhythm Intake/Output:   Visit Vitals  BP (!) 147/66 (BP 1 Location: Left upper arm, BP Patient Position: At rest)   Pulse 69   Temp 97.6 °F (36.4 °C)   Resp 16   Ht 5' 5\" (1.651 m)   Wt 39.2 kg (86 lb 6.7 oz)   SpO2 97%   Breastfeeding No   BMI 14.38 kg/m²       Temp (24hrs), Av.7 °F (36.5 °C), Min:97.6 °F (36.4 °C), Max:97.7 °F (36.5 °C)        O2 Device: Ventimask O2 Flow Rate (L/min): 12 l/min       Wt Readings from Last 4 Encounters:   21 39.2 kg (86 lb 6.7 oz)   21 36.8 kg (81 lb 3.2 oz)   21 45.4 kg (100 lb)          Intake/Output Summary (Last 24 hours) at 2021 1021  Last data filed at 12/15/2021 1816  Gross per 24 hour   Intake 710 ml   Output    Net 710 ml       Last shift:      No intake/output data recorded. Last 3 shifts:  1901 -  0700  In: 710   Out: -        Physical Exam:   General:  female; thin frail ill-appearing elderly female  HEENT: NCAT,   Eyes: anicteric; conjunctiva clear extraocular movements intact  Neck: no nodes, neck veins visible but not engorged, no accessory MM use. Chest: no deformity,   Cardiac: Regular rate and rhythm  Lungs: Poor breath sounds but slightly improved rales in the bases no wheezing today  Abd: Thin soft positive bowel sounds PEG tube in place  Ext: no edema; no joint swelling;  No clubbing  : clear urine  Neuro: Awake alert speech is very hesitant and slow moves all 4 extremities is able to get from the bed to the bedside commode  Psych- no agitation, oriented to person  Skin: warm, dry, no cyanosis;   Pulses: Brachial and radial pulses intact  Capillary: Normal capillary refill    Labs:    Recent Labs     12/14/21  0733   WBC 16.7*   HGB 10.5*   *     Recent Labs     12/14/21  0733   *   K 4.7   CL 96*   CO2 28   *   BUN 31*   CREA 0.67   CA 8.2*   PHOS 3.6   ALB 2.2*   12/13 on 30% Ventimask oxygen saturation 92 to 93%  12/12 50% Ventimask with oxygen saturation 9 8%  Covid antigen positive  Procalcitonin 0.11, 0.16, 0.27    CRP 0.63, 1.51  Lab Results   Component Value Date/Time    Culture result: No growth 6 days 12/08/2021 02:05 AM   No results found for: TSH, TSHEXT, TSHEXT    Imaging:    CXR Results  (Last 48 hours)    None        Results from Hospital Encounter encounter on 12/08/21    XR CHEST PORT    Narrative  Chest single view. Comparison single chest 12/11/2021. Patchy reticular markings lower lungs. Left basilar opacity is new compared to  prior imaging. Findings concerning for pneumonia, advanced compared to prior  imaging. Cardiac silhouette within normal limits. Elongated thoracic aorta with  atherosclerosis. No pneumothorax. Small dependent left pleural effusion. XR ABD (KUB)    Narrative  XR ABD (KUB)    Comparison: None. There is a gastrostomy tube in the left upper quadrant. No dilated loops of  bowel. There is some increased stool throughout the colon and rectum consistent  with some constipation. There is no organomegaly. There are extensive arterial  calcifications in the abdominal aorta and some branch vessels. Bony structures  are unremarkable. Impression  No acute process. Constipation. Atherosclerosis. XR CHEST PORT    Narrative  XR CHEST PORT    Comparison:  12/8/2021. Single view:  Increased patchy bilateral airspace disease with lower lung  predominance suspicious for pneumonia. No pneumothorax or pleural effusion  apparent. The heart size is normal. Calcified plaque noted in the thoracic  aorta. Impression  Suspect worsening pneumonia.     Results from [facility] Encounter encounter on 12/08/21    CTA CHEST W OR W WO CONT    Narrative  CTA chest with intravenous contrast, 80 cc Isovue-370, 3-D MIP images    Dose reduction: All CT scans at this facility are performed using dose reduction  optimization techniques as appropriate to a performed exam including the  following: Automated exposure control, adjustments of the mA and/or kV according  to patient size, or use of iterative reconstruction technique. INDICATION: Hypoxic    COMPARISON: 11/9/2021    FINDINGS:    No pulmonary emboli are identified. No aneurysm or dissection of thoracic aorta. Atherosclerosis including coronary arteries. Slightly prominent mediastinal and  hilar nodes may be reactive. No pleural or pericardial effusions. Partially  visualized left renal lesions are too small to characterize, may represent cysts  or other. Emphysematous changes patchy bilateral opacities in the lungs. Mild  peribronchial thickening. Areas of mild mucus plugging. No pneumothorax. No  acute fracture in the visualized bony structures. Degenerative changes  visualized upper lumbar spine. Impression  No pulmonary emboli. Patchy opacities in the lungs may be due to atelectasis, pneumonia or a  combination of these. Pulmonary emphysema. Atherosclerosis including coronary  arteries. Follow-up as clinically indicated. Please see full report. Discussion: Worsening infiltrate on chest x-ray may represent some congestion from IV fluid administration. She has been given 1 dose of Lasix will discontinue IV fluids continue her tube feedings. She has Covid 19 antigen positive with pneumonitis consistent with Covid is on Remdesivir Decadron and baricitinib would continue them. She has a chronic PEG tube in place for tube feedings for failure to thrive appears very poorly nutrition with protein wasting.   She has significant wheezing is getting as needed albuterol and averaging only twice a day will place it every 6 hours and add Symbicort which she was on at home. She is on Decadron which will give her steroids for her COPD as well  12/13 may be slightly improved FiO2 has been dropped to 35%. Urine output not recorded yesterday but she states she did have significant diuresis after Lasix. Creatinine stable we will repeat Lasix today.   Phosphorus is low to be repleted  ·     Lionel Perkins MD

## 2021-12-16 NOTE — PROGRESS NOTES
Writer assumed care of patient at 200. Patient is well known to writer. Patient has had episodes of anxiety and reports consistent shortness of breath. VSS. SPO2 94-97% with RR 16-26. Patient reports having anxiety. Vistaril PRN has not been effective, per patient. Contacted overnight provider who ordered a one time dose of xanax with positive effect. Patient reports feeling much better with the opportunity to get some rest. Will pass along to daytime nurse and provider.     Anibal Perkins RN

## 2021-12-16 NOTE — PROGRESS NOTES
Problem: Risk for Spread of Infection  Goal: Prevent transmission of infectious organism to others  Description: Prevent the transmission of infectious organisms to other patients, staff members, and visitors. Outcome: Progressing Towards Goal  Note: Utilize proper PPE when entering patient's room and proper hand hygiene     Problem: Patient Education:  Go to Education Activity  Goal: Patient/Family Education  Outcome: Progressing Towards Goal     Problem: Airway Clearance - Ineffective  Goal: Achieve or maintain patent airway  Outcome: Progressing Towards Goal  Note: Promote pulmonary hygiene. Problem: Gas Exchange - Impaired  Goal: Absence of hypoxia  Outcome: Progressing Towards Goal  Note: Monitor oxygen saturation and respiratory assessment. Wean  oxygen/   Goal: Promote optimal lung function  Outcome: Progressing Towards Goal     Problem: Breathing Pattern - Ineffective  Goal: Ability to achieve and maintain a regular respiratory rate  Outcome: Progressing Towards Goal     Problem: Body Temperature -  Risk of, Imbalanced  Goal: Ability to maintain a body temperature within defined limits  Outcome: Progressing Towards Goal     Problem: Nutrition Deficits  Goal: Optimize nutrtional status  Outcome: Progressing Towards Goal     Problem: Risk for Fluid Volume Deficit  Goal: Maintain normal heart rhythm  Outcome: Progressing Towards Goal     Problem: Fatigue  Goal: Verbalize increase energy and improved vitality  Outcome: Progressing Towards Goal  Note: Evaluate nutrition status. Problem: Patient Education: Go to Patient Education Activity  Goal: Patient/Family Education  Outcome: Progressing Towards Goal     Problem: Falls - Risk of  Goal: *Absence of Falls  Description: Document Alutiiq Cease Fall Risk and appropriate interventions in the flowsheet.   Outcome: Progressing Towards Goal  Note: Fall Risk Interventions:  Mobility Interventions: Bed/chair exit alarm,Patient to call before getting OOB,PT Consult for mobility concerns,Utilize walker, cane, or other assistive device         Medication Interventions: Bed/chair exit alarm,Patient to call before getting OOB,Teach patient to arise slowly    Elimination Interventions: Bed/chair exit alarm,Call light in reach    History of Falls Interventions: Bed/chair exit alarm,Room close to nurse's station         Problem: Patient Education: Go to Patient Education Activity  Goal: Patient/Family Education  Outcome: Progressing Towards Goal     Problem: Pressure Injury - Risk of  Goal: *Prevention of pressure injury  Description: Document Hiro Scale and appropriate interventions in the flowsheet. Outcome: Progressing Towards Goal  Note: Pressure Injury Interventions:  Sensory Interventions: Keep linens dry and wrinkle-free,Minimize linen layers,Monitor skin under medical devices,Pad between skin to skin    Moisture Interventions: Absorbent underpads,Apply protective barrier, creams and emollients,Check for incontinence Q2 hours and as needed,Internal/External urinary devices,Limit adult briefs,Maintain skin hydration (lotion/cream),Minimize layers,Moisture barrier    Activity Interventions: Increase time out of bed    Mobility Interventions: Float heels,HOB 30 degrees or less,Turn and reposition approx.  every two hours(pillow and wedges)    Nutrition Interventions: Document food/fluid/supplement intake,Offer support with meals,snacks and hydration    Friction and Shear Interventions: Apply protective barrier, creams and emollients,Minimize layers,Transferring/repositioning devices                Problem: Patient Education: Go to Patient Education Activity  Goal: Patient/Family Education  Outcome: Progressing Towards Goal     Problem: Loneliness or Risk for Loneliness  Goal: Demonstrate positive use of time alone when socialization is not possible  Outcome: Not Progressing Towards Goal  Note: Assess perception of loneliness     Help patient cope     Make sure patient's cell phone is charge to speak with family

## 2021-12-17 NOTE — PROGRESS NOTES
Problem: Mobility Impaired (Adult and Pediatric)  Goal: *Acute Goals and Plan of Care (Insert Text)  Description: Pt will be I with LE HEP in 7 days. Pt will perform bed mobility with mod I in 7 days. Pt will perform transfers with mod I in 7 days. Pt will independently verbalize energy conservation techniques such as pursed lip breathing in 7 days. Outcome: Progressing Towards Goal   PHYSICAL THERAPY TREATMENT  Patient: Dennis Kelley (19 y.o. female)  Date: 12/17/2021  Diagnosis: Pneumonia due to COVID-19 virus [U07.1, J12.82]  Atrial fibrillation with RVR ([address] [I48.91]  New onset atrial fibrillation ([address] [I48.91] <principal problem not specified>       Precautions:    Chart, physical therapy assessment, plan of care and goals were reviewed. ASSESSMENT  Patient continues with skilled PT services and is progressing towards goals. Pt received supine in bed, pleasant and cooperative, on 6.5 L O2. She denied any pain or discomfort. SpO2 95% at start of care. Pt completed supine LE ex, SpO2 between 94% and 97%. Pt declined sitting and getting OOB, but with encouragement, she agreed. Rolling and supine>sit with SBA and scooting to EOB with min A with additional time. SpO2 on EOB 96%. Pt completed seated ex on EOB, SpO2 97%. Transfer sit<>stand with min A, SpO2 96%. Pt stood for ~30 sec for gown adjustment. She declined amb and sitting in chair. She was educated on the benefits of sitting up during the day, pointing out that highest SpO2 level were during sitting and with standing. Pt went back to supine with SBA and additional time. Pt was left supine in bed, call bell within reach, all needs addressed. SpO2 at end of session 95%.   Current Level of Function Impacting Discharge (mobility/balance): assist x 1, balance deficit    Other factors to consider for discharge: endurance, assist level, severity of deficits         PLAN :  Patient continues to benefit from skilled intervention to address the above impairments. Continue treatment per established plan of care. to address goals. Recommendation for discharge: (in order for the patient to meet his/her long term goals)  To Be Determined    This discharge recommendation:  Has been made in collaboration with the attending provider and/or case management    IF patient discharges home will need the following DME: to be determined (TBD)       SUBJECTIVE:   Patient stated I feel ok.     OBJECTIVE DATA SUMMARY:   Critical Behavior:  Neurologic State: Alert  Orientation Level: Oriented X4  Cognition: Follows commands,Appropriate decision making,Appropriate for age attention/concentration,Appropriate safety awareness     Functional Mobility Training:  Bed Mobility:  Rolling: Supervision  Supine to Sit: Stand-by assistance; Additional time  Sit to Supine: Stand-by assistance; Additional time  Scooting: Minimum assistance     Transfers:  Sit to Stand: Minimum assistance  Stand to Sit: Minimum assistance     Balance:  Sitting: Intact; With support  Standing: Impaired; Without support  Standing - Static: Fair;Constant support    Therapeutic Exercises:       EXERCISE   Sets   Reps   Active Active Assist   Passive Self ROM   Comments   Ankle Pumps 1 20 [x] [] [] []    Glut Sets 1 10 [x] [] [] []    Heel Slides 1 10 [x] [] [] []    Hip abd/add 1 10 [x] [] [] []    Long Arc Quads 1 10 [x] [] [] [] sitting on EOB   Marching 1 10 [x] [] [] [] sitting on EOB      Pain Ratin/10    Activity Tolerance:   Fair  Please refer to the flowsheet for vital signs taken during this treatment. After treatment patient left in no apparent distress:   Supine in bed, Call bell within reach, and Side rails x 3    COMMUNICATION/COLLABORATION:   The patients plan of care was discussed with: Registered nurse.      Jose Alejandro Bravo PTA   Time Calculation: 25 mins

## 2021-12-17 NOTE — PROGRESS NOTES
Consult  Pulmonary, Critical Care    Name: Brant Limon MRN: [id number]   : 1945 Hospital: [facility]   Date: 2021  Admission date: 2021 Hospital Day: 10       Subjective/Interval History:   Patient seen on the medical floor. She is awake able to sit up and walk to the bedside commode she looks very weak and frail.  No particular complaints still looks very frail. FiO2 down to 35%    Hospital Problems  Date Reviewed: 2021          Codes Class Noted POA    New onset atrial fibrillation (HCC) ICD-10-CM: I48.91  ICD-9-CM: 427.31  2021 Unknown        Atrial fibrillation with RVR (HCC) ICD-10-CM: I48.91  ICD-9-CM: 427.31  2021 Unknown        Pneumonia due to COVID-19 virus ICD-10-CM: U07.1, J12.82  ICD-9-CM: 480.8, 079.89  2021 Unknown              IMPRESSION:   1. Acute hypoxic respiratory failure now on 6 liter nasal cannula  2. COVID-19 pneumonitis  3. Chronic PEG tube with feedings  4. Probable aspiration pneumonia more noticeable infiltrate left base  5. Worsening infiltrates in the bases possible causes include vascular congestion from IV fluids worsening Covid pneumonia mucous plugging from severe COPD or aspiration  6. Worsening leukocytosis questionably aspiration event  7. COPD with bronchospasm improved  8. Hypophosphatemia to be repleted  9. Atrial fibrillation converted to sinus  10. Malnutrition  Body mass index is 14.38 kg/m². RECOMMENDATIONS/PLAN:   1. Will continue to wean oxygen per protocol on 6 liter  nasal cannula we will continue to wean  2. Continue  Decadron baricitinib has completed Remdesivir  3. Continue oxygen saturation to maintain above 90%   4. Continue Symbicort and albuterol on a set schedule  5. Continue tube feedings with water flushes  6.  Agree empiric IV Zosyn          [x] High complexity decision making was performed  [x] See my orders for details      Subjective/Initial History:     I was asked by Sindy Alvarez MD to see Eden Lee  a 77 y.o.  female in consultation for a chief complaint of acute hypoxic respiratory failure Covid pneumonia worsening infiltrate    No Known Allergies     MAR reviewed and pertinent medications noted or modified as needed     Current Facility-Administered Medications   Medication    ALPRAZolam (XANAX) tablet 0.5 mg    enoxaparin (LOVENOX) injection 40 mg    bisacodyL (DULCOLAX) suppository 10 mg    docusate (COLACE) 50 mg/5 mL oral liquid 100 mg    ascorbic acid (vitamin C) (VITAMIN C) tablet 500 mg    cholecalciferol (VITAMIN D3) (1000 Units /25 mcg) tablet 2,000 Units    guaiFENesin ER (MUCINEX) tablet 600 mg    famotidine (PEPCID) tablet 20 mg    albuterol (PROVENTIL HFA, VENTOLIN HFA, PROAIR HFA) inhaler 2 Puff    budesonide-formoteroL (SYMBICORT) 160-4.5 mcg/actuation HFA inhaler 2 Puff    piperacillin-tazobactam (ZOSYN) 3.375 g in 0.9% sodium chloride (MBP/ADV) 100 mL MBP    hydrOXYzine pamoate (VISTARIL) capsule 25 mg    guaiFENesin-codeine (ROBITUSSIN AC) 100-10 mg/5 mL solution 5 mL    dextromethorphan (DELSYM) 30 mg/5 mL syrup 30 mg    lisinopriL (PRINIVIL, ZESTRIL) tablet 5 mg    metoprolol tartrate (LOPRESSOR) tablet 50 mg    sodium chloride (NS) flush 5-40 mL    sodium chloride (NS) flush 5-40 mL    acetaminophen (TYLENOL) tablet 650 mg    Or    acetaminophen (TYLENOL) suppository 650 mg    polyethylene glycol (MIRALAX) packet 17 g    ondansetron (ZOFRAN ODT) tablet 4 mg    Or    ondansetron (ZOFRAN) injection 4 mg    dexamethasone (PF) (DECADRON) 10 mg/mL injection 6 mg    baricitinib (OLUMIANT) tablet 4 mg    dilTIAZem ER (CARDIZEM CD) capsule 120 mg      Patient PCP: Anjum Qureshi MD  PMH:  has a past medical history of COPD (chronic obstructive pulmonary disease) ([address] Essential hypertension, Family history of skin cancer, Osteoporosis, and Tobacco abuse.   PSH:   has a past surgical history that includes hx mohs procedure (2018). FHX: family history includes Hypertension in her mother. SHX:  reports that she has been smoking. She started smoking about 60 years ago. She has a 60.00 pack-year smoking history. She has never used smokeless tobacco.  Systemic review not reliable from her she does carry on a conversation but she contradicts herself several times while I am talking with her    Objective:     Vital Signs: Telemetry:    normal sinus rhythm Intake/Output:   Visit Vitals  BP (!) 147/63 (BP 1 Location: Left upper arm)   Pulse 73   Temp 97.5 °F (36.4 °C)   Resp 21   Ht 5' 5\" (1.651 m)   Wt 39.2 kg (86 lb 6.7 oz)   SpO2 93%   Breastfeeding No   BMI 14.38 kg/m²       Temp (24hrs), Av.6 °F (36.4 °C), Min:97.5 °F (36.4 °C), Max:97.6 °F (36.4 °C)        O2 Device: Ventimask O2 Flow Rate (L/min): 6 l/min       Wt Readings from Last 4 Encounters:   21 39.2 kg (86 lb 6.7 oz)   21 36.8 kg (81 lb 3.2 oz)   21 45.4 kg (100 lb)          Intake/Output Summary (Last 24 hours) at 2021 0902  Last data filed at 2021 8727  Gross per 24 hour   Intake 1630 ml   Output 1700 ml   Net -70 ml       Last shift:      No intake/output data recorded. Last 3 shifts: 12/15 1901 -  0700  In: 1630   Out: 1700 [Urine:1700]       Physical Exam:   General:  female; thin frail ill-appearing elderly female  HEENT: NCAT,   Eyes: anicteric; conjunctiva clear extraocular movements intact  Neck: no nodes, neck veins visible but not engorged, no accessory MM use. Chest: no deformity,   Cardiac: Regular rate and rhythm  Lungs: Poor breath sounds but slightly improved rales in the bases no wheezing today  Abd: Thin soft positive bowel sounds PEG tube in place  Ext: no edema; no joint swelling;  No clubbing  : clear urine  Neuro: Awake alert speech is very hesitant and slow moves all 4 extremities is able to get from the bed to the bedside commode  Psych- no agitation, oriented to person  Skin: warm, dry, no cyanosis;   Pulses: Brachial and radial pulses intact  Capillary: Normal capillary refill    Labs:    No results for input(s): WBC, HGB, PLT, INR, APTT, HGBEXT, PLTEXT, INREXT, HGBEXT, PLTEXT, INREXT in the last 72 hours. No lab exists for component: FIB, DDMER  No results for input(s): NA, K, CL, CO2, GLU, BUN, CREA, CA, MG, PHOS, LAC, ALB, TBIL, ALT, AML, LPSE in the last 72 hours. No lab exists for component: SGOT12/13 on 30% Ventimask oxygen saturation 92 to 93%  12/12 50% Ventimask with oxygen saturation 9 8%  Covid antigen positive  Procalcitonin 0.11, 0.16, 0.27    CRP 0.63, 1.51  Lab Results   Component Value Date/Time    Culture result: No growth 6 days 12/08/2021 02:05 AM   No results found for: TSH, TSHEXT, TSHEXT    Imaging:    CXR Results  (Last 48 hours)    None        Results from Hospital Encounter encounter on 12/08/21    XR CHEST PORT    Narrative  Chest single view. Comparison single chest 12/11/2021. Patchy reticular markings lower lungs. Left basilar opacity is new compared to  prior imaging. Findings concerning for pneumonia, advanced compared to prior  imaging. Cardiac silhouette within normal limits. Elongated thoracic aorta with  atherosclerosis. No pneumothorax. Small dependent left pleural effusion. XR ABD (KUB)    Narrative  XR ABD (KUB)    Comparison: None. There is a gastrostomy tube in the left upper quadrant. No dilated loops of  bowel. There is some increased stool throughout the colon and rectum consistent  with some constipation. There is no organomegaly. There are extensive arterial  calcifications in the abdominal aorta and some branch vessels. Bony structures  are unremarkable. Impression  No acute process. Constipation. Atherosclerosis. XR CHEST PORT    Narrative  XR CHEST PORT    Comparison:  12/8/2021. Single view:  Increased patchy bilateral airspace disease with lower lung  predominance suspicious for pneumonia.  No pneumothorax or pleural effusion  apparent. The heart size is normal. Calcified plaque noted in the thoracic  aorta. Impression  Suspect worsening pneumonia. Results from [place] encounter on 12/08/21    CTA CHEST W OR W WO CONT    Narrative  CTA chest with intravenous contrast, 80 cc Isovue-370, 3-D MIP images    Dose reduction: All CT scans at this facility are performed using dose reduction  optimization techniques as appropriate to a performed exam including the  following: Automated exposure control, adjustments of the mA and/or kV according  to patient size, or use of iterative reconstruction technique. INDICATION: Hypoxic    COMPARISON: 11/9/2021    FINDINGS:    No pulmonary emboli are identified. No aneurysm or dissection of thoracic aorta. Atherosclerosis including coronary arteries. Slightly prominent mediastinal and  hilar nodes may be reactive. No pleural or pericardial effusions. Partially  visualized left renal lesions are too small to characterize, may represent cysts  or other. Emphysematous changes patchy bilateral opacities in the lungs. Mild  peribronchial thickening. Areas of mild mucus plugging. No pneumothorax. No  acute fracture in the visualized bony structures. Degenerative changes  visualized upper lumbar spine. Impression  No pulmonary emboli. Patchy opacities in the lungs may be due to atelectasis, pneumonia or a  combination of these. Pulmonary emphysema. Atherosclerosis including coronary  arteries. Follow-up as clinically indicated. Please see full report. Discussion: Worsening infiltrate on chest x-ray may represent some congestion from IV fluid administration. She has been given 1 dose of Lasix will discontinue IV fluids continue her tube feedings. She has Covid 19 antigen positive with pneumonitis consistent with Covid is on Remdesivir Decadron and baricitinib would continue them.   She has a chronic PEG tube in place for tube feedings for failure to thrive appears very poorly nutrition with protein wasting. She has significant wheezing is getting as needed albuterol and averaging only twice a day will place it every 6 hours and add Symbicort which she was on at home. She is on Decadron which will give her steroids for her COPD as well  12/13 may be slightly improved FiO2 has been dropped to 35%. Urine output not recorded yesterday but she states she did have significant diuresis after Lasix. Creatinine stable we will repeat Lasix today.   Phosphorus is low to be repleted  ·     Denisa Da Silva MD

## 2021-12-17 NOTE — PROGRESS NOTES
Problem: Risk for Spread of Infection  Goal: Prevent transmission of infectious organism to others  Description: Prevent the transmission of infectious organisms to other patients, staff members, and visitors. Outcome: Progressing Towards Goal     Problem: Patient Education:  Go to Education Activity  Goal: Patient/Family Education  Outcome: Progressing Towards Goal     Problem: Airway Clearance - Ineffective  Goal: Achieve or maintain patent airway  Outcome: Progressing Towards Goal     Problem: Gas Exchange - Impaired  Goal: Absence of hypoxia  Outcome: Progressing Towards Goal  Goal: Promote optimal lung function  Outcome: Progressing Towards Goal     Problem: Breathing Pattern - Ineffective  Goal: Ability to achieve and maintain a regular respiratory rate  Outcome: Progressing Towards Goal     Problem:  Body Temperature -  Risk of, Imbalanced  Goal: Ability to maintain a body temperature within defined limits  Outcome: Progressing Towards Goal  Goal: Complications related to the disease process, condition or treatment will be avoided or minimized  Outcome: Progressing Towards Goal     Problem: Isolation Precautions - Risk of Spread of Infection  Goal: Prevent transmission of infectious organism to others  Outcome: Progressing Towards Goal     Problem: Nutrition Deficits  Goal: Optimize nutrtional status  Outcome: Progressing Towards Goal     Problem: Risk for Fluid Volume Deficit  Goal: Maintain normal heart rhythm  Outcome: Progressing Towards Goal  Goal: Maintain absence of muscle cramping  Outcome: Progressing Towards Goal  Goal: Maintain normal serum potassium, sodium, calcium, phosphorus, and pH  Outcome: Progressing Towards Goal     Problem: Loneliness or Risk for Loneliness  Goal: Demonstrate positive use of time alone when socialization is not possible  Outcome: Progressing Towards Goal     Problem: Fatigue  Goal: Verbalize increase energy and improved vitality  Outcome: Progressing Towards Goal Problem: Patient Education: Go to Patient Education Activity  Goal: Patient/Family Education  Outcome: Progressing Towards Goal     Problem: Falls - Risk of  Goal: *Absence of Falls  Description: Document Alf Mejía Fall Risk and appropriate interventions in the flowsheet. Outcome: Progressing Towards Goal  Note: Fall Risk Interventions:  Mobility Interventions: Bed/chair exit alarm,Patient to call before getting OOB,PT Consult for mobility concerns,PT Consult for assist device competence,Strengthening exercises (ROM-active/passive)         Medication Interventions: Bed/chair exit alarm,Patient to call before getting OOB,Teach patient to arise slowly    Elimination Interventions: Call light in reach,Bed/chair exit alarm    History of Falls Interventions: Bed/chair exit alarm,Room close to nurse's station         Problem: Patient Education: Go to Patient Education Activity  Goal: Patient/Family Education  Outcome: Progressing Towards Goal     Problem: Pressure Injury - Risk of  Goal: *Prevention of pressure injury  Description: Document Hiro Scale and appropriate interventions in the flowsheet.   Outcome: Progressing Towards Goal  Note: Pressure Injury Interventions:  Sensory Interventions: Assess changes in LOC,Avoid rigorous massage over bony prominences,Float heels,Keep linens dry and wrinkle-free,Maintain/enhance activity level,Minimize linen layers,Monitor skin under medical devices,Pad between skin to skin    Moisture Interventions: Absorbent underpads,Apply protective barrier, creams and emollients,Assess need for specialty bed,Limit adult briefs,Maintain skin hydration (lotion/cream),Minimize layers,Moisture barrier    Activity Interventions: Assess need for specialty bed,Increase time out of bed    Mobility Interventions: Float heels,PT/OT evaluation,HOB 30 degrees or less    Nutrition Interventions: Document food/fluid/supplement intake,Offer support with meals,snacks and hydration    Friction and Shear Interventions: Apply protective barrier, creams and emollients,HOB 30 degrees or less                Problem: Patient Education: Go to Patient Education Activity  Goal: Patient/Family Education  Outcome: Progressing Towards Goal     Problem: Patient Education: Go to Patient Education Activity  Goal: Patient/Family Education  Outcome: Progressing Towards Goal     Problem: Patient Education: Go to Patient Education Activity  Goal: Patient/Family Education  Outcome: Progressing Towards Goal

## 2021-12-17 NOTE — PROGRESS NOTES
CM updated HH and [facility] with current clinicals. PT/OT recommendations still pending respiratory status improvement. CM will continue to follow.

## 2021-12-17 NOTE — PROGRESS NOTES
Hospitalist Progress Note               Daily Progress Note: 12/17/2021      Subjective: The patient is seen for follow up. She is a 80-year-old female with history of COPD and hypertension, brought to the ED overnight with shortness of breath and generalized weakness. She has had cough as well. Arrival she had oxygen saturations in the 80s on room air. She tested positive for COVID-19. Multiple family members are also positive. CTA was negative for PE and reports patchy opacities, pna or atelectasis. She was also found to have rapid atrial fibrillation which is new for her  Patient is unvaccinated for Covid.   She had been sick for 2 or 3 days  Patient was admitted, started on Remdesivir and steroids along with baricitinib  -------    On 6L NC  comfortable        Problem List:  Problem List as of 12/17/2021 Date Reviewed: 11/12/2021          Codes Class Noted - Resolved    New onset atrial fibrillation ([facility] ICD-10-CM: I48.91  ICD-9-CM: 427.31  12/8/2021 - Present        Atrial fibrillation with RVR ([facility] ICD-10-CM: I48.91  ICD-9-CM: 427.31  12/8/2021 - Present        Pneumonia due to COVID-19 virus ICD-10-CM: U07.1, J12.82  ICD-9-CM: 480.8, 079.89  12/8/2021 - Present        Dysphagia ICD-10-CM: R13.10  ICD-9-CM: 787.20  11/10/2021 - Present        Failure to thrive (child) ICD-10-CM: R62.51  ICD-9-CM: 783.41  11/10/2021 - Present        Severe protein-calorie malnutrition ([facility] ICD-10-CM: E43  ICD-9-CM: 262  11/10/2021 - Present        Unintentional weight loss ICD-10-CM: R63.4  ICD-9-CM: 783.21  11/10/2021 - Present        COPD with acute exacerbation ([facility] ICD-10-CM: J44.1  ICD-9-CM: 491.21  11/9/2021 - Present        Acute respiratory failure with hypoxia ([facility] ICD-10-CM: J96.01  ICD-9-CM: 518.81  11/9/2021 - Present        Hypertension ICD-10-CM: I10  ICD-9-CM: 401.9  11/9/2021 - Present        Tobacco abuse ICD-10-CM: Z72.0  ICD-9-CM: 305.1  11/9/2021 - Present        Osteoporosis ICD-10-CM: M81.0  ICD-9-CM: 733.00  11/9/2021 - Present        Community acquired pneumonia ICD-10-CM: J18.9  ICD-9-CM: 982  11/9/2021 - Present        Bronchiectasis ([address] ICD-10-CM: J47.9  ICD-9-CM: 494.0  11/9/2021 - Present              Medications reviewed  Current Facility-Administered Medications   Medication Dose Route Frequency    ALPRAZolam (XANAX) tablet 0.5 mg  0.5 mg Oral Q6H PRN    enoxaparin (LOVENOX) injection 40 mg  40 mg SubCUTAneous Q24H    bisacodyL (DULCOLAX) suppository 10 mg  10 mg Rectal DAILY PRN    docusate (COLACE) 50 mg/5 mL oral liquid 100 mg  100 mg Per G Tube BID    ascorbic acid (vitamin C) (VITAMIN C) tablet 500 mg  500 mg Per G Tube BID    cholecalciferol (VITAMIN D3) (1000 Units /25 mcg) tablet 2,000 Units  2,000 Units Oral DAILY    guaiFENesin ER (MUCINEX) tablet 600 mg  600 mg Oral BID    famotidine (PEPCID) tablet 20 mg  20 mg Oral BID    albuterol (PROVENTIL HFA, VENTOLIN HFA, PROAIR HFA) inhaler 2 Puff  2 Puff Inhalation Q6H RT    budesonide-formoteroL (SYMBICORT) 160-4.5 mcg/actuation HFA inhaler 2 Puff  2 Puff Inhalation BID RT    piperacillin-tazobactam (ZOSYN) 3.375 g in 0.9% sodium chloride (MBP/ADV) 100 mL MBP  3.375 g IntraVENous Q8H    hydrOXYzine pamoate (VISTARIL) capsule 25 mg  25 mg Per G Tube Q6H PRN    guaiFENesin-codeine (ROBITUSSIN AC) 100-10 mg/5 mL solution 5 mL  5 mL Per G Tube Q6H PRN    dextromethorphan (DELSYM) 30 mg/5 mL syrup 30 mg  30 mg Per G Tube Q12H    lisinopriL (PRINIVIL, ZESTRIL) tablet 5 mg  5 mg Oral DAILY    metoprolol tartrate (LOPRESSOR) tablet 50 mg  50 mg Oral BID    sodium chloride (NS) flush 5-40 mL  5-40 mL IntraVENous Q8H    sodium chloride (NS) flush 5-40 mL  5-40 mL IntraVENous PRN    acetaminophen (TYLENOL) tablet 650 mg  650 mg Oral Q6H PRN    Or    acetaminophen (TYLENOL) suppository 650 mg  650 mg Rectal Q6H PRN    polyethylene glycol (MIRALAX) packet 17 g  17 g Oral DAILY PRN    ondansetron (ZOFRAN ODT) tablet 4 mg 4 mg Oral Q8H PRN    Or    ondansetron (ZOFRAN) injection 4 mg  4 mg IntraVENous Q6H PRN    dexamethasone (PF) (DECADRON) 10 mg/mL injection 6 mg  6 mg IntraVENous Q12H    baricitinib (OLUMIANT) tablet 4 mg  4 mg Oral DAILY    dilTIAZem ER (CARDIZEM CD) capsule 120 mg  120 mg Oral DAILY       Review of Systems:   A comprehensive review of systems was negative except for that written in the HPI. Objective:   Physical Exam:     Visit Vitals  BP (!) 147/63 (BP 1 Location: Left upper arm)   Pulse 73   Temp 97.5 °F (36.4 °C)   Resp 21   Ht 5' 5\" (1.651 m)   Wt 39.2 kg (86 lb 6.7 oz)   SpO2 93%   Breastfeeding No   BMI 14.38 kg/m²    O2 Flow Rate (L/min): 6 l/min O2 Device: Ventimask    Temp (24hrs), Av.6 °F (36.4 °C), Min:97.5 °F (36.4 °C), Max:97.6 °F (36.4 °C)    No intake/output data recorded. 12/15 1901 -  0700  In: 1630   Out: 1700 [Urine:1700]    General:   Awake and alert   Lungs: Few rhonchi bilateral bases, no wheeze, not labored   Chest wall:  No tenderness or deformity. Heart:  Regular rate and rhythm, S1, S2 normal, no murmur, click, rub or gallop. Abdomen:   Soft, non-tender. Bowel sounds normal. No masses,  No organomegaly. Peg site dressing adhered to site, mildly erythematous, not purulent . Extremities: Extremities normal, atraumatic, no cyanosis   Pulses: 2+ and symmetric all extremities. Skin: Warm, dry, poor turgor. Neurologic: CNII-XII intact. No gross focal deficits         Data Review:       Recent Days:  No results for input(s): WBC, HGB, HCT, PLT, HGBEXT, HCTEXT, PLTEXT, HGBEXT, HCTEXT, PLTEXT in the last 72 hours. No results for input(s): NA, K, CL, CO2, GLU, BUN, CREA, CA, MG, PHOS, ALB, TBIL, TBILI, ALT, INR, INREXT, INREXT in the last 72 hours. No lab exists for component: SGOT  No results for input(s): PH, PCO2, PO2, HCO3, FIO2 in the last 72 hours. 24 Hour Results:  No results found for this or any previous visit (from the past 24 hour(s)).     XR CHEST PORT   Final Result      XR ABD (KUB)   Final Result   No acute process. Constipation. Atherosclerosis. XR CHEST PORT   Final Result   Suspect worsening pneumonia. CTA CHEST W OR W WO CONT   Final Result      No pulmonary emboli. Patchy opacities in the lungs may be due to atelectasis, pneumonia or a   combination of these. Pulmonary emphysema. Atherosclerosis including coronary   arteries. Follow-up as clinically indicated. Please see full report. XR CHEST PORT   Final Result      Pulmonary emphysema. Opacities in the lungs may represent atelectasis, pneumonia   or a combination of these. Follow-up as clinically indicated. XR CHEST PORT    (Results Pending)        Assessment:  COVID-19 with pneumonitis, o2 requirements increase yesterday, remains on ventimask, cxr 12/11 suggesting worse pna, aspiration? abx switched to zosyn. Procal descending. Acute respiratory failure with hypoxia, onc o2 with ventimask maintaining spo2>93%    COPD with exacerbation    Atrial fibrillation with RVR, now back and remains in sinus rhythm    Dehydration with hyponatremia, improving volume status, improved to 132    Hypertension    Chronic PEG tube feedings due to history of dysphagia, irritation surrounding site    Constipation    Plan:  Continue Remdesivir, dexamethasone,  and baricitinib  6L  Wean to keep saturation > 92%  Likely be stable discharged Monday, atleast 2L NC    Vtep: lovenox  Gip:pepcid  Full code  Dispo: pending course , >48, I believe c is preference, if needs snf will not need auth, pt/ot    Care Plan discussed with: Patient/Family    Disposition: Continued inpatient care    Total time spent with patient: 40 minutes.     Lyle Barney MD

## 2021-12-18 NOTE — PROGRESS NOTES
Consult  Pulmonary, Critical Care    Name: Poonam Moreno MRN: [id number]   : 1945 Hospital: [address] [place] [address]   Date: 2021  Admission date: 2021 Hospital Day: 11       Subjective/Interval History:   Patient seen on the medical floor. She is awake able to sit up and walk to the bedside commode she looks very weak and frail.  No particular complaints still looks very frail. FiO2 down to 35%    Hospital Problems  Date Reviewed: 2021          Codes Class Noted POA    New onset atrial fibrillation (HCC) ICD-10-CM: I48.91  ICD-9-CM: 427.31  2021 Unknown        Atrial fibrillation with RVR (H[facility]) ICD-10-CM: I48.91  ICD-9-CM: 427.31  2021 Unknown        Pneumonia due to COVID-19 virus ICD-10-CM: U07.1, J12.82  ICD-9-CM: 480.8, 079.89  2021 Unknown              IMPRESSION:   1. Acute hypoxic respiratory failure now on 1 liter nasal cannula  2. COVID-19 pneumonitis  3. Chronic PEG tube with feedings  4. Probable aspiration pneumonia more noticeable infiltrate left base  5. Worsening infiltrates in the bases possible causes include vascular congestion from IV fluids worsening Covid pneumonia mucous plugging from severe COPD or aspiration  6. Worsening leukocytosis questionably aspiration event  7. COPD with bronchospasm improved  8. Hypophosphatemia to be repleted  9. Atrial fibrillation converted to sinus  10. Malnutrition  Body mass index is 14.64 kg/m². RECOMMENDATIONS/PLAN:   1. Will continue to wean oxygen per protocol on 1 liter  nasal cannula we will continue to wean  2. Continue  Decadron baricitinib has completed Remdesivir  3. Continue oxygen saturation to maintain above 90%   4. Continue Symbicort and albuterol on a set schedule  5. Continue tube feedings with water flushes  6.  Agree empiric IV Zosyn          [x] High complexity decision making was performed  [x] See my orders for details      Subjective/Initial History:     I was asked by Ariel Jones MD to see Kevin Landry  a 77 y.o.  female in consultation for a chief complaint of acute hypoxic respiratory failure Covid pneumonia worsening infiltrate    No Known Allergies     MAR reviewed and pertinent medications noted or modified as needed     Current Facility-Administered Medications   Medication    ALPRAZolam (XANAX) tablet 0.5 mg    enoxaparin (LOVENOX) injection 40 mg    bisacodyL (DULCOLAX) suppository 10 mg    docusate (COLACE) 50 mg/5 mL oral liquid 100 mg    ascorbic acid (vitamin C) (VITAMIN C) tablet 500 mg    cholecalciferol (VITAMIN D3) (1000 Units /25 mcg) tablet 2,000 Units    guaiFENesin ER (MUCINEX) tablet 600 mg    famotidine (PEPCID) tablet 20 mg    albuterol (PROVENTIL HFA, VENTOLIN HFA, PROAIR HFA) inhaler 2 Puff    budesonide-formoteroL (SYMBICORT) 160-4.5 mcg/actuation HFA inhaler 2 Puff    piperacillin-tazobactam (ZOSYN) 3.375 g in 0.9% sodium chloride (MBP/ADV) 100 mL MBP    hydrOXYzine pamoate (VISTARIL) capsule 25 mg    guaiFENesin-codeine (ROBITUSSIN AC) 100-10 mg/5 mL solution 5 mL    dextromethorphan (DELSYM) 30 mg/5 mL syrup 30 mg    lisinopriL (PRINIVIL, ZESTRIL) tablet 5 mg    metoprolol tartrate (LOPRESSOR) tablet 50 mg    sodium chloride (NS) flush 5-40 mL    sodium chloride (NS) flush 5-40 mL    acetaminophen (TYLENOL) tablet 650 mg    Or    acetaminophen (TYLENOL) suppository 650 mg    polyethylene glycol (MIRALAX) packet 17 g    ondansetron (ZOFRAN ODT) tablet 4 mg    Or    ondansetron (ZOFRAN) injection 4 mg    dexamethasone (PF) (DECADRON) 10 mg/mL injection 6 mg    baricitinib (OLUMIANT) tablet 4 mg    dilTIAZem ER (CARDIZEM CD) capsule 120 mg      Patient PCP: Yang Padilla MD  PMH:  has a past medical history of COPD (chronic obstructive pulmonary disease) ([address] Essential hypertension, Family history of skin cancer, Osteoporosis, and Tobacco abuse.   PSH:   has a past surgical history that includes hx mohs procedure (2018). FHX: family history includes Hypertension in her mother. SHX:  reports that she has been smoking. She started smoking about 60 years ago. She has a 60.00 pack-year smoking history. She has never used smokeless tobacco.  Systemic review not reliable from her she does carry on a conversation but she contradicts herself several times while I am talking with her    Objective:     Vital Signs: Telemetry:    normal sinus rhythm Intake/Output:   Visit Vitals  BP (!) 152/86 (BP 1 Location: Left upper arm, BP Patient Position: Semi fowlers)   Pulse 86   Temp 97.4 °F (36.3 °C)   Resp 30   Ht 5' 5\" (1.651 m)   Wt 39.9 kg (87 lb 15.4 oz)   SpO2 90%   Breastfeeding No   BMI 14.64 kg/m²       Temp (24hrs), Av.6 °F (36.4 °C), Min:97.4 °F (36.3 °C), Max:97.8 °F (36.6 °C)        O2 Device: Nasal cannula O2 Flow Rate (L/min): 1 l/min       Wt Readings from Last 4 Encounters:   21 39.9 kg (87 lb 15.4 oz)   21 36.8 kg (81 lb 3.2 oz)   21 45.4 kg (100 lb)          Intake/Output Summary (Last 24 hours) at 2021 0938  Last data filed at 2021 5514  Gross per 24 hour   Intake 1365 ml   Output    Net 1365 ml       Last shift:      701 - 1900  In: 420   Out: -   Last 3 shifts: 1901 -  07  In: 1950 [I.V.:100]  Out: 1200 [Urine:1200]       Physical Exam:   General:  female; thin frail ill-appearing elderly female  HEENT: NCAT,   Eyes: anicteric; conjunctiva clear extraocular movements intact  Neck: no nodes, neck veins visible but not engorged, no accessory MM use. Chest: no deformity,   Cardiac: Regular rate and rhythm  Lungs: Poor breath sounds but slightly improved rales in the bases no wheezing today  Abd: Thin soft positive bowel sounds PEG tube in place  Ext: no edema; no joint swelling;  No clubbing  : clear urine  Neuro: Awake alert speech is very hesitant and slow moves all 4 extremities is able to get from the bed to the bedside commode  Psych- no agitation, oriented to person  Skin: warm, dry, no cyanosis;   Pulses: Brachial and radial pulses intact  Capillary: Normal capillary refill    Labs:    No results for input(s): WBC, HGB, PLT, INR, APTT, HGBEXT, PLTEXT, INREXT, HGBEXT, PLTEXT, INREXT in the last 72 hours. No lab exists for component: FIB, DDMER  No results for input(s): NA, K, CL, CO2, GLU, BUN, CREA, CA, MG, PHOS, LAC, ALB, TBIL, ALT, AML, LPSE in the last 72 hours. No lab exists for component: SGOT12/13 on 30% Ventimask oxygen saturation 92 to 93%  12/12 50% Ventimask with oxygen saturation 9 8%  Covid antigen positive  Procalcitonin 0.11, 0.16, 0.27    CRP 0.63, 1.51  Lab Results   Component Value Date/Time    Culture result: No growth 6 days 12/08/2021 02:05 AM   No results found for: TSH, TSHEXT, TSHEXT    Imaging:    CXR Results  (Last 48 hours)               12/18/21 0626  XR CHEST PORT Final result    Impression:  Diffuse and predominantly nodular appearing bilateral lower lobe   opacities. In the acute setting pneumonia or aspiration would be considered. Follow-up to resolution to exclude other active disease. .        Narrative:  HISTORY:  worsening respiratory assessment       TECHNIQUE:  XR CHEST PORT       COMPARISON: 12/17/2021   LIMITATIONS: None       TUBES/LINES: None       LUNG PARENCHYMA: Diffuse nodular opacities particularly throughout bilateral   lower lobes similar to the prior   TRACHEA/BRONCHI: Normal   PULMONARY VESSELS: Normal   PLEURA: Normal   HEART: Normal   AORTIC SHADOW: Calcified. MEDIASTINUM: Normal   BONE/SOFT TISSUES: No acute abnormality. OTHER: None           12/17/21 1247  XR CHEST PORT Final result    Impression:  Significant improvement in appearance of basilar predominant opacities.        Narrative:  Examination: XR CHEST PORT        History: covid       Comparison: Chest radiograph 12/13/2021       FINDINGS:       Single frontal portable view of the chest. Improved aeration of the lungs with   faint persistent basilar opacities. No radiographically evident pneumothorax. No   significant pleural effusion is evident. The cardiac silhouette is normal in   size. Atherosclerosis and tortuosity the thoracic aorta. Osseous structures   appear unchanged. Diffuse osseous demineralization. Probable gastrostomy tube   projecting over the left upper quadrant of the abdomen. Results from Hospital Encounter encounter on 12/08/21    XR CHEST PORT    Narrative  HISTORY:  worsening respiratory assessment    TECHNIQUE:  XR CHEST PORT    COMPARISON: 12/17/2021  LIMITATIONS: None    TUBES/LINES: None    LUNG PARENCHYMA: Diffuse nodular opacities particularly throughout bilateral  lower lobes similar to the prior  TRACHEA/BRONCHI: Normal  PULMONARY VESSELS: Normal  PLEURA: Normal  HEART: Normal  AORTIC SHADOW: Calcified. MEDIASTINUM: Normal  BONE/SOFT TISSUES: No acute abnormality. OTHER: None    Impression  Diffuse and predominantly nodular appearing bilateral lower lobe  opacities. In the acute setting pneumonia or aspiration would be considered. Follow-up to resolution to exclude other active disease. .      XR CHEST PORT    Narrative  Examination: XR CHEST PORT    History: covid    Comparison: Chest radiograph 12/13/2021    FINDINGS:    Single frontal portable view of the chest. Improved aeration of the lungs with  faint persistent basilar opacities. No radiographically evident pneumothorax. No  significant pleural effusion is evident. The cardiac silhouette is normal in  size. Atherosclerosis and tortuosity the thoracic aorta. Osseous structures  appear unchanged. Diffuse osseous demineralization. Probable gastrostomy tube  projecting over the left upper quadrant of the abdomen. Impression  Significant improvement in appearance of basilar predominant opacities. XR CHEST PORT    Narrative  Chest single view. Comparison single chest 12/11/2021.     Patchy reticular markings lower lungs. Left basilar opacity is new compared to  prior imaging. Findings concerning for pneumonia, advanced compared to prior  imaging. Cardiac silhouette within normal limits. Elongated thoracic aorta with  atherosclerosis. No pneumothorax. Small dependent left pleural effusion. Results from [place] encounter on 12/08/21    CTA CHEST W OR W WO CONT    Narrative  CTA chest with intravenous contrast, 80 cc [id number], 3-D MIP images    Dose reduction: All CT scans at this facility are performed using dose reduction  optimization techniques as appropriate to a performed exam including the  following: Automated exposure control, adjustments of the mA and/or kV according  to patient size, or use of iterative reconstruction technique. INDICATION: Hypoxic    COMPARISON: 11/9/2021    FINDINGS:    No pulmonary emboli are identified. No aneurysm or dissection of thoracic aorta. Atherosclerosis including coronary arteries. Slightly prominent mediastinal and  hilar nodes may be reactive. No pleural or pericardial effusions. Partially  visualized left renal lesions are too small to characterize, may represent cysts  or other. Emphysematous changes patchy bilateral opacities in the lungs. Mild  peribronchial thickening. Areas of mild mucus plugging. No pneumothorax. No  acute fracture in the visualized bony structures. Degenerative changes  visualized upper lumbar spine. Impression  No pulmonary emboli. Patchy opacities in the lungs may be due to atelectasis, pneumonia or a  combination of these. Pulmonary emphysema. Atherosclerosis including coronary  arteries. Follow-up as clinically indicated. Please see full report. Discussion: Worsening infiltrate on chest x-ray may represent some congestion from IV fluid administration. She has been given 1 dose of Lasix will discontinue IV fluids continue her tube feedings.   She has Covid 19 antigen positive with pneumonitis consistent with Covid is on Remdesivir Decadron and baricitinib would continue them. She has a chronic PEG tube in place for tube feedings for failure to thrive appears very poorly nutrition with protein wasting. She has significant wheezing is getting as needed albuterol and averaging only twice a day will place it every 6 hours and add Symbicort which she was on at home. She is on Decadron which will give her steroids for her COPD as well  12/13 may be slightly improved FiO2 has been dropped to 35%. Urine output not recorded yesterday but she states she did have significant diuresis after Lasix. Creatinine stable we will repeat Lasix today.   Phosphorus is low to be repleted  ·     Benoit Márquez MD

## 2021-12-18 NOTE — PROGRESS NOTES
BEBO Mckeon notified of patient's worsening respiratory status (increased respirations of use of accessory muscles). Patient has had anxiety while hospitalized, yet this was unrelieved by PRN xanax. Chest x-ray ordered and lab work.        Hellen Gale RN

## 2021-12-18 NOTE — PROGRESS NOTES
Hospitalist Progress Note               Daily Progress Note: 12/18/2021      Subjective: The patient is seen for follow up. She is a 58-year-old female with history of COPD and hypertension, brought to the ED overnight with shortness of breath and generalized weakness. She has had cough as well. Arrival she had oxygen saturations in the 80s on room air. She tested positive for COVID-19. Multiple family members are also positive. CTA was negative for PE and reports patchy opacities, pna or atelectasis. She was also found to have rapid atrial fibrillation which is new for her  Patient is unvaccinated for Covid.   She had been sick for 2 or 3 days  Patient was admitted, started on Remdesivir and steroids along with baricitinib  -------    On 2L maintaining 90%  Tomorrow if she is on room air, and PT OT and seen her  She is stable for discharge  I discussed this with the daughter          Problem List:  Problem List as of 12/18/2021 Date Reviewed: 11/12/2021          Codes Class Noted - Resolved    New onset atrial fibrillation ([facility] ICD-10-CM: I48.91  ICD-9-CM: 427.31  12/8/2021 - Present        Atrial fibrillation with RVR ([facility] ICD-10-CM: I48.91  ICD-9-CM: 427.31  12/8/2021 - Present        Pneumonia due to COVID-19 virus ICD-10-CM: U07.1, J12.82  ICD-9-CM: 480.8, 079.89  12/8/2021 - Present        Dysphagia ICD-10-CM: R13.10  ICD-9-CM: 787.20  11/10/2021 - Present        Failure to thrive (child) ICD-10-CM: R62.51  ICD-9-CM: 783.41  11/10/2021 - Present        Severe protein-calorie malnutrition ([facility] ICD-10-CM: E43  ICD-9-CM: 262  11/10/2021 - Present        Unintentional weight loss ICD-10-CM: R63.4  ICD-9-CM: 783.21  11/10/2021 - Present        COPD with acute exacerbation ([facility] ICD-10-CM: J44.1  ICD-9-CM: 491.21  11/9/2021 - Present        Acute respiratory failure with hypoxia ([facility] ICD-10-CM: J96.01  ICD-9-CM: 518.81  11/9/2021 - Present        Hypertension ICD-10-CM: I10  ICD-9-CM: 401.9 11/9/2021 - Present        Tobacco abuse ICD-10-CM: Z72.0  ICD-9-CM: 305.1  11/9/2021 - Present        Osteoporosis ICD-10-CM: M81.0  ICD-9-CM: 733.00  11/9/2021 - Present        Community acquired pneumonia ICD-10-CM: J18.9  ICD-9-CM: 486  11/9/2021 - Present        Bronchiectasis ([address] ICD-10-CM: J47.9  ICD-9-CM: 494.0  11/9/2021 - Present              Medications reviewed  Current Facility-Administered Medications   Medication Dose Route Frequency    ALPRAZolam (XANAX) tablet 0.5 mg  0.5 mg Oral Q6H PRN    enoxaparin (LOVENOX) injection 40 mg  40 mg SubCUTAneous Q24H    bisacodyL (DULCOLAX) suppository 10 mg  10 mg Rectal DAILY PRN    docusate (COLACE) 50 mg/5 mL oral liquid 100 mg  100 mg Per G Tube BID    ascorbic acid (vitamin C) (VITAMIN C) tablet 500 mg  500 mg Per G Tube BID    cholecalciferol (VITAMIN D3) (1000 Units /25 mcg) tablet 2,000 Units  2,000 Units Oral DAILY    guaiFENesin ER (MUCINEX) tablet 600 mg  600 mg Oral BID    famotidine (PEPCID) tablet 20 mg  20 mg Oral BID    albuterol (PROVENTIL HFA, VENTOLIN HFA, PROAIR HFA) inhaler 2 Puff  2 Puff Inhalation Q6H RT    budesonide-formoteroL (SYMBICORT) 160-4.5 mcg/actuation HFA inhaler 2 Puff  2 Puff Inhalation BID RT    piperacillin-tazobactam (ZOSYN) 3.375 g in 0.9% sodium chloride (MBP/ADV) 100 mL MBP  3.375 g IntraVENous Q8H    hydrOXYzine pamoate (VISTARIL) capsule 25 mg  25 mg Per G Tube Q6H PRN    guaiFENesin-codeine (ROBITUSSIN AC) 100-10 mg/5 mL solution 5 mL  5 mL Per G Tube Q6H PRN    dextromethorphan (DELSYM) 30 mg/5 mL syrup 30 mg  30 mg Per G Tube Q12H    lisinopriL (PRINIVIL, ZESTRIL) tablet 5 mg  5 mg Oral DAILY    metoprolol tartrate (LOPRESSOR) tablet 50 mg  50 mg Oral BID    sodium chloride (NS) flush 5-40 mL  5-40 mL IntraVENous Q8H    sodium chloride (NS) flush 5-40 mL  5-40 mL IntraVENous PRN    acetaminophen (TYLENOL) tablet 650 mg  650 mg Oral Q6H PRN    Or    acetaminophen (TYLENOL) suppository 650 mg  650 mg Rectal Q6H PRN    polyethylene glycol (MIRALAX) packet 17 g  17 g Oral DAILY PRN    ondansetron (ZOFRAN ODT) tablet 4 mg  4 mg Oral Q8H PRN    Or    ondansetron (ZOFRAN) injection 4 mg  4 mg IntraVENous Q6H PRN    dexamethasone (PF) (DECADRON) 10 mg/mL injection 6 mg  6 mg IntraVENous Q12H    baricitinib (OLUMIANT) tablet 4 mg  4 mg Oral DAILY    dilTIAZem ER (CARDIZEM CD) capsule 120 mg  120 mg Oral DAILY       Review of Systems:   A comprehensive review of systems was negative except for that written in the HPI. Objective:   Physical Exam:     Visit Vitals  BP (!) 152/86 (BP 1 Location: Left upper arm, BP Patient Position: Semi fowlers)   Pulse 86   Temp 97.4 °F (36.3 °C)   Resp 30   Ht 5' 5\" (1.651 m)   Wt 39.9 kg (87 lb 15.4 oz)   SpO2 90%   Breastfeeding No   BMI 14.64 kg/m²    O2 Flow Rate (L/min): 1 l/min O2 Device: Nasal cannula    Temp (24hrs), Av.6 °F (36.4 °C), Min:97.4 °F (36.3 °C), Max:97.8 °F (36.6 °C)     07 -  1900  In: 420   Out: -     190 -  0700  In: 1950 [I.V.:100]  Out: 1200 [Urine:1200]    General:   Awake and alert   Lungs: Few rhonchi bilateral bases, no wheeze, not labored   Chest wall:  No tenderness or deformity. Heart:  Regular rate and rhythm, S1, S2 normal, no murmur, click, rub or gallop. Abdomen:   Soft, non-tender. Bowel sounds normal. No masses,  No organomegaly. Peg site dressing adhered to site, mildly erythematous, not purulent . Extremities: Extremities normal, atraumatic, no cyanosis   Pulses: 2+ and symmetric all extremities. Skin: Warm, dry, poor turgor. Neurologic: CNII-XII intact. No gross focal deficits         Data Review:       Recent Days:  No results for input(s): WBC, HGB, HCT, PLT, HGBEXT, HCTEXT, PLTEXT, HGBEXT, HCTEXT, PLTEXT in the last 72 hours. No results for input(s): NA, K, CL, CO2, GLU, BUN, CREA, CA, MG, PHOS, ALB, TBIL, TBILI, ALT, INR, INREXT, INREXT in the last 72 hours.     No lab exists for component: SGOT  No results for input(s): PH, PCO2, PO2, HCO3, FIO2 in the last 72 hours. 24 Hour Results:  No results found for this or any previous visit (from the past 24 hour(s)). XR CHEST PORT   Final Result   Diffuse and predominantly nodular appearing bilateral lower lobe   opacities. In the acute setting pneumonia or aspiration would be considered. Follow-up to resolution to exclude other active disease. .       XR CHEST PORT   Final Result   Significant improvement in appearance of basilar predominant opacities. XR CHEST PORT   Final Result      XR ABD (KUB)   Final Result   No acute process. Constipation. Atherosclerosis. XR CHEST PORT   Final Result   Suspect worsening pneumonia. CTA CHEST W OR W WO CONT   Final Result      No pulmonary emboli. Patchy opacities in the lungs may be due to atelectasis, pneumonia or a   combination of these. Pulmonary emphysema. Atherosclerosis including coronary   arteries. Follow-up as clinically indicated. Please see full report. XR CHEST PORT   Final Result      Pulmonary emphysema. Opacities in the lungs may represent atelectasis, pneumonia   or a combination of these. Follow-up as clinically indicated. Assessment:  COVID-19 with pneumonitis, o2 requirements increase yesterday, remains on ventimask, cxr 12/11 suggesting worse pna, aspiration? abx switched to zosyn. Procal descending.      Acute respiratory failure with hypoxia, onc o2 with ventimask maintaining spo2>93%    COPD with exacerbation    Atrial fibrillation with RVR, now back and remains in sinus rhythm    Dehydration with hyponatremia, improving volume status, improved to 132    Hypertension    Chronic PEG tube feedings due to history of dysphagia, irritation surrounding site    Constipation    Plan:  On 2L maintaining 90%  Tomorrow if she is on room air, and PT OT and seen her  She is stable for discharge  I discussed this with the daughter  We can dc remdesivir and     Vtep: lovenox  Gip:pepcid  Full code  Dispo: pending course , >48, I believe [facility] is preference, if needs snf will not need auth, pt/ot    Care Plan discussed with: Patient/Family    Disposition: Continued inpatient care    Total time spent with patient: 40 minutes.     Kurtis Skelton MD

## 2021-12-19 NOTE — PROGRESS NOTES
Pulmonary, Critical Care Note    Name: Jace Rocha MRN: [id number]   : 1945 Hospital: [address] [place] [address]   Date: 2021  Admission date: 2021 Hospital Day: 12       Subjective/Interval History:   Patient seen on the medical floor. She is awake able to sit up and walk to the bedside commode she looks very weak and frail.  No particular complaints still looks very frail. FiO2 down to 35%    Hospital Problems  Date Reviewed: 2021          Codes Class Noted POA    New onset atrial fibrillation (HCC) ICD-10-CM: I48.91  ICD-9-CM: 427.31  2021 Unknown        Atrial fibrillation with RVR (HCC) ICD-10-CM: I48.91  ICD-9-CM: 427.31  2021 Unknown        Pneumonia due to COVID-19 virus ICD-10-CM: U07.1, J12.82  ICD-9-CM: 480.8, 079.89  2021 Unknown              IMPRESSION:   1. Acute hypoxic respiratory failure now on Ventimask  2. COVID-19 pneumonitis  3. Chronic PEG tube with feedings  4. Probable aspiration pneumonia more noticeable infiltrate left base  5. Worsening infiltrates in the bases possible causes include vascular congestion from IV fluids worsening Covid pneumonia mucous plugging from severe COPD or aspiration  6. Worsening leukocytosis questionably aspiration event  7. COPD with bronchospasm improved  8. Hypophosphatemia to be repleted  9. Atrial fibrillation converted to sinus  10. Malnutrition  Body mass index is 14.64 kg/m². RECOMMENDATIONS/PLAN:   1. Will continue to wean oxygen per protocol on Ventimask still hypoxic  2. Continue  Decadron baricitinib has completed Remdesivir  3. Continue oxygen saturation to maintain above 90%   4. Continue Symbicort and albuterol on a set schedule  5. Continue tube feedings with water flushes  6. Agree empiric IV Zosyn  7. Will repeat chest x-ray in a.m.           [x] High complexity decision making was performed  [x] See my orders for details      Subjective/Initial History:     I was asked by [facility] Shelly Piña MD to see Sandeep Barnard  a 77 y.o.  female in consultation for a chief complaint of acute hypoxic respiratory failure Covid pneumonia worsening infiltrate    No Known Allergies     MAR reviewed and pertinent medications noted or modified as needed     Current Facility-Administered Medications   Medication    ALPRAZolam (XANAX) tablet 0.5 mg    enoxaparin (LOVENOX) injection 40 mg    bisacodyL (DULCOLAX) suppository 10 mg    docusate (COLACE) 50 mg/5 mL oral liquid 100 mg    ascorbic acid (vitamin C) (VITAMIN C) tablet 500 mg    cholecalciferol (VITAMIN D3) (1000 Units /25 mcg) tablet 2,000 Units    guaiFENesin ER (MUCINEX) tablet 600 mg    famotidine (PEPCID) tablet 20 mg    albuterol (PROVENTIL HFA, VENTOLIN HFA, PROAIR HFA) inhaler 2 Puff    budesonide-formoteroL (SYMBICORT) 160-4.5 mcg/actuation HFA inhaler 2 Puff    piperacillin-tazobactam (ZOSYN) 3.375 g in 0.9% sodium chloride (MBP/ADV) 100 mL MBP    hydrOXYzine pamoate (VISTARIL) capsule 25 mg    guaiFENesin-codeine (ROBITUSSIN AC) 100-10 mg/5 mL solution 5 mL    dextromethorphan (DELSYM) 30 mg/5 mL syrup 30 mg    lisinopriL (PRINIVIL, ZESTRIL) tablet 5 mg    metoprolol tartrate (LOPRESSOR) tablet 50 mg    sodium chloride (NS) flush 5-40 mL    sodium chloride (NS) flush 5-40 mL    acetaminophen (TYLENOL) tablet 650 mg    Or    acetaminophen (TYLENOL) suppository 650 mg    polyethylene glycol (MIRALAX) packet 17 g    ondansetron (ZOFRAN ODT) tablet 4 mg    Or    ondansetron (ZOFRAN) injection 4 mg    dexamethasone (PF) (DECADRON) 10 mg/mL injection 6 mg    baricitinib (OLUMIANT) tablet 4 mg    dilTIAZem ER (CARDIZEM CD) capsule 120 mg      Patient PCP: Abdon Spencer MD  PMH:  has a past medical history of COPD (chronic obstructive pulmonary disease) ([address] Essential hypertension, Family history of skin cancer, Osteoporosis, and Tobacco abuse.   PSH:   has a past surgical history that includes hx mohs procedure (2018). FHX: family history includes Hypertension in her mother. SHX:  reports that she has been smoking. She started smoking about 60 years ago. She has a 60.00 pack-year smoking history. She has never used smokeless tobacco.  Systemic review not reliable from her she does carry on a conversation but she contradicts herself several times while I am talking with her    Objective:     Vital Signs: Telemetry:    normal sinus rhythm Intake/Output:   Visit Vitals  BP (!) 156/71   Pulse (!) 103   Temp 97.6 °F (36.4 °C)   Resp 22   Ht 5' 5\" (1.651 m)   Wt 39.9 kg (87 lb 15.4 oz)   SpO2 95%   Breastfeeding No   BMI 14.64 kg/m²       Temp (24hrs), Av.6 °F (36.4 °C), Min:97.4 °F (36.3 °C), Max:97.8 °F (36.6 °C)        O2 Device: Venturi-mask O2 Flow Rate (L/min): 15 l/min       Wt Readings from Last 4 Encounters:   21 39.9 kg (87 lb 15.4 oz)   21 36.8 kg (81 lb 3.2 oz)   21 45.4 kg (100 lb)          Intake/Output Summary (Last 24 hours) at 2021 1043  Last data filed at 2021 0512  Gross per 24 hour   Intake 720 ml   Output 1100 ml   Net -380 ml       Last shift:      No intake/output data recorded. Last 3 shifts:  1901 -  0700  In: 1600   Out: 1100 [Urine:1100]       Physical Exam:   General:  female; thin frail ill-appearing elderly female  HEENT: NCAT,   Eyes: anicteric; conjunctiva clear extraocular movements intact  Neck: no nodes, neck veins visible but not engorged, no accessory MM use. Chest: no deformity,   Cardiac: Regular rate and rhythm  Lungs: Poor breath sounds but slightly improved rales in the bases no wheezing today  Abd: Thin soft positive bowel sounds PEG tube in place  Ext: no edema; no joint swelling;  No clubbing  : clear urine  Neuro: Awake alert speech is very hesitant and slow moves all 4 extremities is able to get from the bed to the bedside commode  Psych- no agitation, oriented to person  Skin: warm, dry, no cyanosis;   Pulses: Brachial and radial pulses intact  Capillary: Normal capillary refill    Labs:    Recent Labs     12/19/21  0941   WBC 36.5*   HGB 12.6   *     Recent Labs     12/19/21  0941   *   K 5.0   CL 94*   CO2 26   *   BUN 26*   CREA 0.61   CA 9.4   ALB 2.4*   ALT 29   12/13 on 30% Ventimask oxygen saturation 92 to 93%  12/12 50% Ventimask with oxygen saturation 9 8%  Covid antigen positive  Procalcitonin 0.11, 0.16, 0.27    CRP 0.63, 1.51  Lab Results   Component Value Date/Time    Culture result: No growth 6 days 12/08/2021 02:05 AM   No results found for: TSH, TSHEXT, TSHEXT    Imaging:    CXR Results  (Last 48 hours)               12/18/21 0626  XR CHEST PORT Final result    Impression:  Diffuse and predominantly nodular appearing bilateral lower lobe   opacities. In the acute setting pneumonia or aspiration would be considered. Follow-up to resolution to exclude other active disease. .        Narrative:  HISTORY:  worsening respiratory assessment       TECHNIQUE:  XR CHEST PORT       COMPARISON: 12/17/2021   LIMITATIONS: None       TUBES/LINES: None       LUNG PARENCHYMA: Diffuse nodular opacities particularly throughout bilateral   lower lobes similar to the prior   TRACHEA/BRONCHI: Normal   PULMONARY VESSELS: Normal   PLEURA: Normal   HEART: Normal   AORTIC SHADOW: Calcified. MEDIASTINUM: Normal   BONE/SOFT TISSUES: No acute abnormality. OTHER: None           12/17/21 1247  XR CHEST PORT Final result    Impression:  Significant improvement in appearance of basilar predominant opacities. Narrative:  Examination: XR CHEST PORT        History: covid       Comparison: Chest radiograph 12/13/2021       FINDINGS:       Single frontal portable view of the chest. Improved aeration of the lungs with   faint persistent basilar opacities. No radiographically evident pneumothorax. No   significant pleural effusion is evident. The cardiac silhouette is normal in   size. Atherosclerosis and tortuosity the thoracic aorta. Osseous structures   appear unchanged. Diffuse osseous demineralization. Probable gastrostomy tube   projecting over the left upper quadrant of the abdomen. Results from Hospital Encounter encounter on 12/08/21    XR CHEST PORT    Narrative  HISTORY:  worsening respiratory assessment    TECHNIQUE:  XR CHEST PORT    COMPARISON: 12/17/2021  LIMITATIONS: None    TUBES/LINES: None    LUNG PARENCHYMA: Diffuse nodular opacities particularly throughout bilateral  lower lobes similar to the prior  TRACHEA/BRONCHI: Normal  PULMONARY VESSELS: Normal  PLEURA: Normal  HEART: Normal  AORTIC SHADOW: Calcified. MEDIASTINUM: Normal  BONE/SOFT TISSUES: No acute abnormality. OTHER: None    Impression  Diffuse and predominantly nodular appearing bilateral lower lobe  opacities. In the acute setting pneumonia or aspiration would be considered. Follow-up to resolution to exclude other active disease. .      XR CHEST PORT    Narrative  Examination: XR CHEST PORT    History: covid    Comparison: Chest radiograph 12/13/2021    FINDINGS:    Single frontal portable view of the chest. Improved aeration of the lungs with  faint persistent basilar opacities. No radiographically evident pneumothorax. No  significant pleural effusion is evident. The cardiac silhouette is normal in  size. Atherosclerosis and tortuosity the thoracic aorta. Osseous structures  appear unchanged. Diffuse osseous demineralization. Probable gastrostomy tube  projecting over the left upper quadrant of the abdomen. Impression  Significant improvement in appearance of basilar predominant opacities. XR CHEST PORT    Narrative  Chest single view. Comparison single chest 12/11/2021. Patchy reticular markings lower lungs. Left basilar opacity is new compared to  prior imaging. Findings concerning for pneumonia, advanced compared to prior  imaging. Cardiac silhouette within normal limits. Elongated thoracic aorta with  atherosclerosis. No pneumothorax. Small dependent left pleural effusion. Results from [place] encounter on 12/08/21    CTA CHEST W OR W WO CONT    Narrative  CTA chest with intravenous contrast, 80 cc [id number], 3-D MIP images    Dose reduction: All CT scans at this facility are performed using dose reduction  optimization techniques as appropriate to a performed exam including the  following: Automated exposure control, adjustments of the mA and/or kV according  to patient size, or use of iterative reconstruction technique. INDICATION: Hypoxic    COMPARISON: 11/9/2021    FINDINGS:    No pulmonary emboli are identified. No aneurysm or dissection of thoracic aorta. Atherosclerosis including coronary arteries. Slightly prominent mediastinal and  hilar nodes may be reactive. No pleural or pericardial effusions. Partially  visualized left renal lesions are too small to characterize, may represent cysts  or other. Emphysematous changes patchy bilateral opacities in the lungs. Mild  peribronchial thickening. Areas of mild mucus plugging. No pneumothorax. No  acute fracture in the visualized bony structures. Degenerative changes  visualized upper lumbar spine. Impression  No pulmonary emboli. Patchy opacities in the lungs may be due to atelectasis, pneumonia or a  combination of these. Pulmonary emphysema. Atherosclerosis including coronary  arteries. Follow-up as clinically indicated. Please see full report. Discussion: Worsening infiltrate on chest x-ray may represent some congestion from IV fluid administration. She has been given 1 dose of Lasix will discontinue IV fluids continue her tube feedings. She has Covid 19 antigen positive with pneumonitis consistent with Covid is on Remdesivir Decadron and baricitinib would continue them.   She has a chronic PEG tube in place for tube feedings for failure to thrive appears very poorly nutrition with protein wasting. She has significant wheezing is getting as needed albuterol and averaging only twice a day will place it every 6 hours and add Symbicort which she was on at home. She is on Decadron which will give her steroids for her COPD as well  12/13 may be slightly improved FiO2 has been dropped to 35%. Urine output not recorded yesterday but she states she did have significant diuresis after Lasix. Creatinine stable we will repeat Lasix today.   Phosphorus is low to be repleted  ·     Solange Abel MD

## 2021-12-19 NOTE — PROGRESS NOTES
Hospitalist Progress Note               Daily Progress Note: 12/19/2021      Subjective: The patient is seen for follow up. She is a 36-year-old female with history of COPD and hypertension, brought to the ED overnight with shortness of breath and generalized weakness. She has had cough as well. Arrival she had oxygen saturations in the 80s on room air. She tested positive for COVID-19. Multiple family members are also positive. CTA was negative for PE and reports patchy opacities, pna or atelectasis. She was also found to have rapid atrial fibrillation which is new for her  Patient is unvaccinated for Covid. She had been sick for 2 or 3 days  Patient was admitted, started on Remdesivir and steroids along with baricitinib  -------    Today her o2 saturation went up on venti mask 15L,  It does appear there is an additive factor of anxiety  Will wean o2 as needed.            Problem List:  Problem List as of 12/19/2021 Date Reviewed: 11/12/2021          Codes Class Noted - Resolved    New onset atrial fibrillation ([facility] ICD-10-CM: I48.91  ICD-9-CM: 427.31  12/8/2021 - Present        Atrial fibrillation with RVR ([facility] ICD-10-CM: I48.91  ICD-9-CM: 427.31  12/8/2021 - Present        Pneumonia due to COVID-19 virus ICD-10-CM: U07.1, J12.82  ICD-9-CM: 480.8, 079.89  12/8/2021 - Present        Dysphagia ICD-10-CM: R13.10  ICD-9-CM: 787.20  11/10/2021 - Present        Failure to thrive (child) ICD-10-CM: R62.51  ICD-9-CM: 783.41  11/10/2021 - Present        Severe protein-calorie malnutrition ([facility] ICD-10-CM: E43  ICD-9-CM: 262  11/10/2021 - Present        Unintentional weight loss ICD-10-CM: R63.4  ICD-9-CM: 783.21  11/10/2021 - Present        COPD with acute exacerbation ([facility] ICD-10-CM: J44.1  ICD-9-CM: 491.21  11/9/2021 - Present        Acute respiratory failure with hypoxia ([facility] ICD-10-CM: J96.01  ICD-9-CM: 518.81  11/9/2021 - Present        Hypertension ICD-10-CM: I10  ICD-9-CM: 401.9  11/9/2021 - Present        Tobacco abuse ICD-10-CM: Z72.0  ICD-9-CM: 305.1  11/9/2021 - Present        Osteoporosis ICD-10-CM: M81.0  ICD-9-CM: 733.00  11/9/2021 - Present        Community acquired pneumonia ICD-10-CM: J18.9  ICD-9-CM: 486  11/9/2021 - Present        Bronchiectasis ([facility] ICD-10-CM: J47.9  ICD-9-CM: 494.0  11/9/2021 - Present              Medications reviewed  Current Facility-Administered Medications   Medication Dose Route Frequency    ALPRAZolam (XANAX) tablet 0.5 mg  0.5 mg Oral Q6H PRN    enoxaparin (LOVENOX) injection 40 mg  40 mg SubCUTAneous Q24H    bisacodyL (DULCOLAX) suppository 10 mg  10 mg Rectal DAILY PRN    docusate (COLACE) 50 mg/5 mL oral liquid 100 mg  100 mg Per G Tube BID    ascorbic acid (vitamin C) (VITAMIN C) tablet 500 mg  500 mg Per G Tube BID    cholecalciferol (VITAMIN D3) (1000 Units /25 mcg) tablet 2,000 Units  2,000 Units Oral DAILY    guaiFENesin ER (MUCINEX) tablet 600 mg  600 mg Oral BID    famotidine (PEPCID) tablet 20 mg  20 mg Oral BID    albuterol (PROVENTIL HFA, VENTOLIN HFA, PROAIR HFA) inhaler 2 Puff  2 Puff Inhalation Q6H RT    budesonide-formoteroL (SYMBICORT) 160-4.5 mcg/actuation HFA inhaler 2 Puff  2 Puff Inhalation BID RT    piperacillin-tazobactam (ZOSYN) 3.375 g in 0.9% sodium chloride (MBP/ADV) 100 mL MBP  3.375 g IntraVENous Q8H    hydrOXYzine pamoate (VISTARIL) capsule 25 mg  25 mg Per G Tube Q6H PRN    guaiFENesin-codeine (ROBITUSSIN AC) 100-10 mg/5 mL solution 5 mL  5 mL Per G Tube Q6H PRN    dextromethorphan (DELSYM) 30 mg/5 mL syrup 30 mg  30 mg Per G Tube Q12H    lisinopriL (PRINIVIL, ZESTRIL) tablet 5 mg  5 mg Oral DAILY    metoprolol tartrate (LOPRESSOR) tablet 50 mg  50 mg Oral BID    sodium chloride (NS) flush 5-40 mL  5-40 mL IntraVENous Q8H    sodium chloride (NS) flush 5-40 mL  5-40 mL IntraVENous PRN    acetaminophen (TYLENOL) tablet 650 mg  650 mg Oral Q6H PRN    Or    acetaminophen (TYLENOL) suppository 650 mg  650 mg Rectal Q6H PRN    polyethylene glycol (MIRALAX) packet 17 g  17 g Oral DAILY PRN    ondansetron (ZOFRAN ODT) tablet 4 mg  4 mg Oral Q8H PRN    Or    ondansetron (ZOFRAN) injection 4 mg  4 mg IntraVENous Q6H PRN    dexamethasone (PF) (DECADRON) 10 mg/mL injection 6 mg  6 mg IntraVENous Q12H    baricitinib (OLUMIANT) tablet 4 mg  4 mg Oral DAILY    dilTIAZem ER (CARDIZEM CD) capsule 120 mg  120 mg Oral DAILY       Review of Systems:   A comprehensive review of systems was negative except for that written in the HPI. Objective:   Physical Exam:     Visit Vitals  BP (!) 156/71   Pulse (!) 103   Temp 97.6 °F (36.4 °C)   Resp 22   Ht 5' 5\" (1.651 m)   Wt 39.9 kg (87 lb 15.4 oz)   SpO2 95%   Breastfeeding No   BMI 14.64 kg/m²    O2 Flow Rate (L/min): 15 l/min O2 Device: Venturi-mask    Temp (24hrs), Av.6 °F (36.4 °C), Min:97.4 °F (36.3 °C), Max:97.8 °F (36.6 °C)    No intake/output data recorded.  1901 -  0700  In: 1600   Out: 80 [Urine:1100]    General:   Awake and alert   Lungs: Few rhonchi bilateral bases, no wheeze, not labored   Chest wall:  No tenderness or deformity. Heart:  Regular rate and rhythm, S1, S2 normal, no murmur, click, rub or gallop. Abdomen:   Soft, non-tender. Bowel sounds normal. No masses,  No organomegaly. Peg site dressing adhered to site, mildly erythematous, not purulent . Extremities: Extremities normal, atraumatic, no cyanosis   Pulses: 2+ and symmetric all extremities. Skin: Warm, dry, poor turgor. Neurologic: CNII-XII intact. No gross focal deficits         Data Review:       Recent Days:  Recent Labs     21  0941   WBC 36.5*   HGB 12.6   HCT 37.5   *     Recent Labs     21  0941   *   K 5.0   CL 94*   CO2 26   *   BUN 26*   CREA 0.61   CA 9.4   ALB 2.4*   TBILI 0.6   ALT 29     No results for input(s): PH, PCO2, PO2, HCO3, FIO2 in the last 72 hours.     24 Hour Results:  Recent Results (from the past 24 hour(s))   CBC WITH AUTOMATED DIFF    Collection Time: 12/19/21  9:41 AM   Result Value Ref Range    WBC 36.5 (H) 3.6 - 11.0 K/uL    RBC 4.11 3.80 - 5.20 M/uL    HGB 12.6 11.5 - 16.0 g/dL    HCT 37.5 35.0 - 47.0 %    MCV 91.2 80.0 - 99.0 FL    MCH 30.7 26.0 - 34.0 PG    MCHC 33.6 30.0 - 36.5 g/dL    RDW 16.3 (H) 11.5 - 14.5 %    PLATELET 874 (H) 935 - 400 K/uL    MPV 9.9 8.9 - 12.9 FL    NRBC 0.0 0.0  WBC    ABSOLUTE NRBC 0.00 0.00 - 0.01 K/uL    NEUTROPHILS 90 (H) 32 - 75 %    LYMPHOCYTES 1 (L) 12 - 49 %    MONOCYTES 8 5 - 13 %    EOSINOPHILS 0 0 - 7 %    BASOPHILS 0 0 - 1 %    IMMATURE GRANULOCYTES 1 (H) 0 - 0.5 %    ABS. NEUTROPHILS 32.9 (H) 1.8 - 8.0 K/UL    ABS. LYMPHOCYTES 0.4 (L) 0.8 - 3.5 K/UL    ABS. MONOCYTES 2.8 (H) 0.0 - 1.0 K/UL    ABS. EOSINOPHILS 0.0 0.0 - 0.4 K/UL    ABS. BASOPHILS 0.1 0.0 - 0.1 K/UL    ABS. IMM. GRANS. 0.4 (H) 0.00 - 0.04 K/UL    DF AUTOMATED     D DIMER    Collection Time: 12/19/21  9:41 AM   Result Value Ref Range    D DIMER 0.53 (H) <0.50 ug/ml(FEU)   METABOLIC PANEL, COMPREHENSIVE    Collection Time: 12/19/21  9:41 AM   Result Value Ref Range    Sodium 129 (L) 136 - 145 mmol/L    Potassium 5.0 3.5 - 5.1 mmol/L    Chloride 94 (L) 97 - 108 mmol/L    CO2 26 21 - 32 mmol/L    Anion gap 9 5 - 15 mmol/L    Glucose 120 (H) 65 - 100 mg/dL    BUN 26 (H) 6 - 20 mg/dL    Creatinine 0.61 0.55 - 1.02 mg/dL    BUN/Creatinine ratio 43 (H) 12 - 20      GFR est AA >60 >60 ml/min/1.73m2    GFR est non-AA >60 >60 ml/min/1.73m2    Calcium 9.4 8.5 - 10.1 mg/dL    Bilirubin, total 0.6 0.2 - 1.0 mg/dL    AST (SGOT) 24 15 - 37 U/L    ALT (SGPT) 29 12 - 78 U/L    Alk. phosphatase 71 45 - 117 U/L    Protein, total 6.2 (L) 6.4 - 8.2 g/dL    Albumin 2.4 (L) 3.5 - 5.0 g/dL    Globulin 3.8 2.0 - 4.0 g/dL    A-G Ratio 0.6 (L) 1.1 - 2.2         XR CHEST PORT   Final Result   Diffuse and predominantly nodular appearing bilateral lower lobe   opacities. In the acute setting pneumonia or aspiration would be considered. Follow-up to resolution to exclude other active disease. .       XR CHEST PORT   Final Result   Significant improvement in appearance of basilar predominant opacities. XR CHEST PORT   Final Result      XR ABD (KUB)   Final Result   No acute process. Constipation. Atherosclerosis. XR CHEST PORT   Final Result   Suspect worsening pneumonia. CTA CHEST W OR W WO CONT   Final Result      No pulmonary emboli. Patchy opacities in the lungs may be due to atelectasis, pneumonia or a   combination of these. Pulmonary emphysema. Atherosclerosis including coronary   arteries. Follow-up as clinically indicated. Please see full report. XR CHEST PORT   Final Result      Pulmonary emphysema. Opacities in the lungs may represent atelectasis, pneumonia   or a combination of these. Follow-up as clinically indicated. XR CHEST PORT    (Results Pending)        Assessment:  COVID-19 with pneumonitis, o2 requirements increase yesterday, remains on ventimask, cxr 12/11 suggesting worse pna, aspiration? abx switched to zosyn. Procal descending. Acute respiratory failure with hypoxia, onc o2 with ventimask maintaining spo2>93%    COPD with exacerbation    Atrial fibrillation with RVR, now back and remains in sinus rhythm    Dehydration with hyponatremia, improving volume status, improved to 132    Hypertension    Chronic PEG tube feedings due to history of dysphagia, irritation surrounding site    Constipation    Plan: Today her o2 saturation went up on venti mask 15L,  It does appear there is an additive factor of anxiety  Will wean o2 as needed.   Tomorrow if she is on room air, and PT OT and seen her  She is stable for discharge  I discussed this with the daughter  We can dc remdesivir and     Vtep: lovenox  Gip:pepcid  Full code  Dispo: pending course , >48, I believe c is preference, if needs snf will not need auth, pt/ot    Care Plan discussed with: Patient/Family    Disposition: Continued inpatient care    Total time spent with patient: 40 minutes.     Estee Diaz MD

## 2021-12-20 NOTE — PROGRESS NOTES
CM uploaded updated clinical notes to [facility] and [address] [place] [address]. CM will continue to follow patient for any changes in discharge needs. Patient still in the process of weaning from oxygen.

## 2021-12-20 NOTE — PROGRESS NOTES
PHYSICAL THERAPY TREATMENT  Patient: Roni Thomas (13 y.o. female)  Date: 12/20/2021  Diagnosis: Pneumonia due to COVID-19 virus [U07.1, J12.82]  Atrial fibrillation with RVR ([address] [I48.91]  New onset atrial fibrillation ([address] [I48.91] <principal problem not specified>       Precautions:    Chart, physical therapy assessment, plan of care and goals were reviewed. ASSESSMENT  Patient continues with skilled PT services and is not progressing towards goals. PT semi supine in bed upon arrival and agreeable to session initially, but then not agreeable to bed mobility. With encouragement pt agreeable to therex. Pt completed UE and LE therex in semi supine position, see LE therex details below. Only able to tolerate 4 exercises before pt stating she was SOB and cold and did not want to do any more TE. Pt on 9L via venti mask during session, SpO2 ranging from 93-95%. Spoke with supervising PT and due to decline in status and decreased activity tolerance, changing POC from 5x/wk to 3x/wk and changing d/c recommending from IRF vs HHPT to SNF. Current Level of Function Impacting Discharge (mobility/balance): declining mobility     Other factors to consider for discharge: severity of deficits          PLAN :  Patient continues to benefit from skilled intervention to address the above impairments. Continue treatment per established plan of care. to address goals.     Recommendation for discharge: (in order for the patient to meet his/her long term goals)  Karsten Bain    This discharge recommendation:  Has been made in collaboration with the attending provider and/or case management    IF patient discharges home will need the following DME: to be determined (TBD)       SUBJECTIVE:   Patient stated no when asked to sit EOB    OBJECTIVE DATA SUMMARY:   Critical Behavior:  Neurologic State: Alert,Confused  Orientation Level: Oriented to person  Cognition: Decreased attention/concentration      Therapeutic Exercises:   1 x 20 AP  1 x 15 hip ab/ad in long sit AAROM  Pt then reporting feeling SOB and with decreased command following so further therex held at this time    Pain Ratin/10    Activity Tolerance:   Poor, desaturates with exertion and requires oxygen, and observed SOB with activity  Please refer to the flowsheet for vital signs taken during this treatment. After treatment patient left in no apparent distress:   Supine in bed and Call bell within reach    COMMUNICATION/COLLABORATION:   The patients plan of care was discussed with: Physical therapist, Occupational therapist, and Registered nurse. Problem: Mobility Impaired (Adult and Pediatric)  Goal: *Acute Goals and Plan of Care (Insert Text)  Description: Pt will be I with LE HEP in 7 days. Pt will perform bed mobility with mod I in 7 days. Pt will perform transfers with mod I in 7 days. Pt will independently verbalize energy conservation techniques such as pursed lip breathing in 7 days.   Outcome: Not Progressing Towards Goal       Grisel Enciso PTA   Time Calculation: 13 mins

## 2021-12-20 NOTE — PROGRESS NOTES
Pulmonary, Critical Care Note    Name: Jen Cummings MRN: [id number]   : 1945 Hospital: [facility]   Date: 2021  Admission date: 2021 Hospital Day: 13       Subjective/Interval History:   Patient seen on the medical floor. She is awake able to sit up and walk to the bedside commode she looks very weak and frail.  No particular complaints still looks very frail. FiO2 down to 35%    Hospital Problems  Date Reviewed: 2021          Codes Class Noted POA    New onset atrial fibrillation (HCC) ICD-10-CM: I48.91  ICD-9-CM: 427.31  2021 Unknown        Atrial fibrillation with RVR (HCC) ICD-10-CM: I48.91  ICD-9-CM: 427.31  2021 Unknown        Pneumonia due to COVID-19 virus ICD-10-CM: U07.1, J12.82  ICD-9-CM: 480.8, 079.89  2021 Unknown              IMPRESSION:   1. Acute hypoxic respiratory failure now on Ventimask  2. COVID-19 pneumonitis  3. Chronic PEG tube with feedings  4. Probable aspiration pneumonia more noticeable infiltrate left base  5. Worsening infiltrates in the bases possible causes include vascular congestion from IV fluids worsening Covid pneumonia mucous plugging from severe COPD or aspiration  6. Worsening leukocytosis questionably aspiration event  7. COPD with bronchospasm improved  8. Hypophosphatemia to be repleted  9. Atrial fibrillation converted to sinus  10. Malnutrition  Body mass index is 14.64 kg/m². RECOMMENDATIONS/PLAN:   1. Will continue to wean oxygen per protocol on Ventimask still hypoxic  2. Continue  Decadron baricitinib has completed Remdesivir  3. Continue oxygen saturation to maintain above 90%   4. Continue Symbicort and albuterol on a set schedule  5. Continue tube feedings with water flushes  6. Agree empiric IV Zosyn  7. Complaining of back pain continue with the Tylenol  8.  PT OT evaluate          [x] High complexity decision making was performed  [x] See my orders for details      Subjective/Initial History:     I was asked by Eze Li MD to see Pamella Rojas  a 77 y.o.    female in consultation for a chief complaint of acute hypoxic respiratory failure Covid pneumonia worsening infiltrate    No Known Allergies     MAR reviewed and pertinent medications noted or modified as needed     Current Facility-Administered Medications   Medication    ALPRAZolam (XANAX) tablet 0.5 mg    enoxaparin (LOVENOX) injection 40 mg    bisacodyL (DULCOLAX) suppository 10 mg    docusate (COLACE) 50 mg/5 mL oral liquid 100 mg    ascorbic acid (vitamin C) (VITAMIN C) tablet 500 mg    cholecalciferol (VITAMIN D3) (1000 Units /25 mcg) tablet 2,000 Units    guaiFENesin ER (MUCINEX) tablet 600 mg    famotidine (PEPCID) tablet 20 mg    albuterol (PROVENTIL HFA, VENTOLIN HFA, PROAIR HFA) inhaler 2 Puff    budesonide-formoteroL (SYMBICORT) 160-4.5 mcg/actuation HFA inhaler 2 Puff    piperacillin-tazobactam (ZOSYN) 3.375 g in 0.9% sodium chloride (MBP/ADV) 100 mL MBP    hydrOXYzine pamoate (VISTARIL) capsule 25 mg    guaiFENesin-codeine (ROBITUSSIN AC) 100-10 mg/5 mL solution 5 mL    dextromethorphan (DELSYM) 30 mg/5 mL syrup 30 mg    lisinopriL (PRINIVIL, ZESTRIL) tablet 5 mg    metoprolol tartrate (LOPRESSOR) tablet 50 mg    sodium chloride (NS) flush 5-40 mL    sodium chloride (NS) flush 5-40 mL    acetaminophen (TYLENOL) tablet 650 mg    Or    acetaminophen (TYLENOL) suppository 650 mg    polyethylene glycol (MIRALAX) packet 17 g    ondansetron (ZOFRAN ODT) tablet 4 mg    Or    ondansetron (ZOFRAN) injection 4 mg    dexamethasone (PF) (DECADRON) 10 mg/mL injection 6 mg    baricitinib (OLUMIANT) tablet 4 mg    dilTIAZem ER (CARDIZEM CD) capsule 120 mg      Patient PCP: Gina Zaragoza MD  PMH:  has a past medical history of COPD (chronic obstructive pulmonary disease) ([facility] Essential hypertension, Family history of skin cancer, Osteoporosis, and Tobacco abuse.  PSH:   has a past surgical history that includes hx mohs procedure (2018). FHX: family history includes Hypertension in her mother. SHX:  reports that she has been smoking. She started smoking about 60 years ago. She has a 60.00 pack-year smoking history. She has never used smokeless tobacco.  Systemic review not reliable from her she does carry on a conversation but she contradicts herself several times while I am talking with her    Objective:     Vital Signs: Telemetry:    normal sinus rhythm Intake/Output:   Visit Vitals  BP (!) 151/72   Pulse 94   Temp 97.7 °F (36.5 °C)   Resp 22   Ht 5' 5\" (1.651 m)   Wt 39.9 kg (87 lb 15.4 oz)   SpO2 90%   Breastfeeding No   BMI 14.64 kg/m²       Temp (24hrs), Av.3 °F (36.3 °C), Min:96.9 °F (36.1 °C), Max:97.7 °F (36.5 °C)        O2 Device: Ventimask O2 Flow Rate (L/min): 8 l/min       Wt Readings from Last 4 Encounters:   21 39.9 kg (87 lb 15.4 oz)   21 36.8 kg (81 lb 3.2 oz)   21 45.4 kg (100 lb)          Intake/Output Summary (Last 24 hours) at 2021 0930  Last data filed at 2021 0301  Gross per 24 hour   Intake 1155 ml   Output 1500 ml   Net -345 ml       Last shift:      No intake/output data recorded. Last 3 shifts:  1901 -  0700  In: 1875   Out: 2600 [Urine:2600]       Physical Exam:   General:  female; thin frail ill-appearing elderly female  HEENT: NCAT,   Eyes: anicteric; conjunctiva clear extraocular movements intact  Neck: no nodes, neck veins visible but not engorged, no accessory MM use. Chest: no deformity,   Cardiac: Regular rate and rhythm  Lungs: Poor breath sounds but slightly improved rales in the bases no wheezing today  Abd: Thin soft positive bowel sounds PEG tube in place  Ext: no edema; no joint swelling;  No clubbing  : clear urine  Neuro: Awake alert speech is very hesitant and slow moves all 4 extremities is able to get from the bed to the bedside commode  Psych- no agitation, oriented to person  Skin: warm, dry, no cyanosis;   Pulses: Brachial and radial pulses intact  Capillary: Normal capillary refill    Labs:    Recent Labs     12/19/21  0941   WBC 36.5*   HGB 12.6   *     Recent Labs     12/19/21  0941   *   K 5.0   CL 94*   CO2 26   *   BUN 26*   CREA 0.61   CA 9.4   ALB 2.4*   ALT 29   12/13 on 30% Ventimask oxygen saturation 92 to 93%  12/12 50% Ventimask with oxygen saturation 9 8%  Covid antigen positive  Procalcitonin 0.11, 0.16, 0.27    CRP 0.63, 1.51  Lab Results   Component Value Date/Time    Culture result: No growth 6 days 12/08/2021 02:05 AM   No results found for: TSH, TSHEXT, TSHEXT    Imaging:    CXR Results  (Last 48 hours)    None        Results from Hospital Encounter encounter on 12/08/21    XR CHEST PORT    Narrative  HISTORY:  worsening respiratory assessment    TECHNIQUE:  XR CHEST PORT    COMPARISON: 12/17/2021  LIMITATIONS: None    TUBES/LINES: None    LUNG PARENCHYMA: Diffuse nodular opacities particularly throughout bilateral  lower lobes similar to the prior  TRACHEA/BRONCHI: Normal  PULMONARY VESSELS: Normal  PLEURA: Normal  HEART: Normal  AORTIC SHADOW: Calcified. MEDIASTINUM: Normal  BONE/SOFT TISSUES: No acute abnormality. OTHER: None    Impression  Diffuse and predominantly nodular appearing bilateral lower lobe  opacities. In the acute setting pneumonia or aspiration would be considered. Follow-up to resolution to exclude other active disease. .      XR CHEST PORT    Narrative  Examination: XR CHEST PORT    History: covid    Comparison: Chest radiograph 12/13/2021    FINDINGS:    Single frontal portable view of the chest. Improved aeration of the lungs with  faint persistent basilar opacities. No radiographically evident pneumothorax. No  significant pleural effusion is evident. The cardiac silhouette is normal in  size. Atherosclerosis and tortuosity the thoracic aorta. Osseous structures  appear unchanged.  Diffuse osseous demineralization. Probable gastrostomy tube  projecting over the left upper quadrant of the abdomen. Impression  Significant improvement in appearance of basilar predominant opacities. XR CHEST PORT    Narrative  Chest single view. Comparison single chest 12/11/2021. Patchy reticular markings lower lungs. Left basilar opacity is new compared to  prior imaging. Findings concerning for pneumonia, advanced compared to prior  imaging. Cardiac silhouette within normal limits. Elongated thoracic aorta with  atherosclerosis. No pneumothorax. Small dependent left pleural effusion. Results from [place] encounter on 12/08/21    CTA CHEST W OR W WO CONT    Narrative  CTA chest with intravenous contrast, 80 cc [id number], 3-D MIP images    Dose reduction: All CT scans at this facility are performed using dose reduction  optimization techniques as appropriate to a performed exam including the  following: Automated exposure control, adjustments of the mA and/or kV according  to patient size, or use of iterative reconstruction technique. INDICATION: Hypoxic    COMPARISON: 11/9/2021    FINDINGS:    No pulmonary emboli are identified. No aneurysm or dissection of thoracic aorta. Atherosclerosis including coronary arteries. Slightly prominent mediastinal and  hilar nodes may be reactive. No pleural or pericardial effusions. Partially  visualized left renal lesions are too small to characterize, may represent cysts  or other. Emphysematous changes patchy bilateral opacities in the lungs. Mild  peribronchial thickening. Areas of mild mucus plugging. No pneumothorax. No  acute fracture in the visualized bony structures. Degenerative changes  visualized upper lumbar spine. Impression  No pulmonary emboli. Patchy opacities in the lungs may be due to atelectasis, pneumonia or a  combination of these. Pulmonary emphysema. Atherosclerosis including coronary  arteries.  Follow-up as clinically indicated. Please see full report. Discussion: Worsening infiltrate on chest x-ray may represent some congestion from IV fluid administration. She has been given 1 dose of Lasix will discontinue IV fluids continue her tube feedings. She has Covid 19 antigen positive with pneumonitis consistent with Covid is on Remdesivir Decadron and baricitinib would continue them. She has a chronic PEG tube in place for tube feedings for failure to thrive appears very poorly nutrition with protein wasting. She has significant wheezing is getting as needed albuterol and averaging only twice a day will place it every 6 hours and add Symbicort which she was on at home. She is on Decadron which will give her steroids for her COPD as well  12/13 may be slightly improved FiO2 has been dropped to 35%. Urine output not recorded yesterday but she states she did have significant diuresis after Lasix. Creatinine stable we will repeat Lasix today.   Phosphorus is low to be repleted  ·     Leti Oneill MD

## 2021-12-20 NOTE — PROGRESS NOTES
Hospitalist Progress Note               Daily Progress Note: 12/20/2021      Subjective:    She is a 23-year-old female with history of COPD and hypertension, brought to the ED overnight with shortness of breath and generalized weakness. She has had cough as well. Arrival she had oxygen saturations in the 80s on room air. She tested positive for COVID-19. Multiple family members are also positive. CTA was negative for PE and reports patchy opacities, pna or atelectasis. She was also found to have rapid atrial fibrillation which is new for her  Patient is unvaccinated for Covid. She had been sick for 2 or 3 days  Patient was admitted, started on Remdesivir and steroids along with baricitinib  -------    Her oxygen requirements improved over the week to 3L, and then increased to 8L on venti mask    Xray was repeated that showed interval increase in right lung base, concerning for aspiration, she is on chronic tube feeds and NPO. On zosyn.  pulm input appreciated              Problem List:  Problem List as of 12/20/2021 Date Reviewed: 11/12/2021          Codes Class Noted - Resolved    New onset atrial fibrillation ([facility] [address] ICD-10-CM: I48.91  ICD-9-CM: 427.31  12/8/2021 - Present        Atrial fibrillation with RVR ([facility] [address] ICD-10-CM: I48.91  ICD-9-CM: 427.31  12/8/2021 - Present        Pneumonia due to COVID-19 virus ICD-10-CM: U07.1, J12.82  ICD-9-CM: 480.8, 079.89  12/8/2021 - Present        Dysphagia ICD-10-CM: R13.10  ICD-9-CM: 787.20  11/10/2021 - Present        Failure to thrive (child) ICD-10-CM: R62.51  ICD-9-CM: 783.41  11/10/2021 - Present        Severe protein-calorie malnutrition ([facility] [address] ICD-10-CM: E43  ICD-9-CM: 262  11/10/2021 - Present        Unintentional weight loss ICD-10-CM: R63.4  ICD-9-CM: 783.21  11/10/2021 - Present        COPD with acute exacerbation ([address] ICD-10-CM: J44.1  ICD-9-CM: 491.21  11/9/2021 - Present        Acute respiratory failure with hypoxia ([facility] ICD-10-CM: J96.01  ICD-9-CM: 518.81  11/9/2021 - Present        Hypertension ICD-10-CM: I10  ICD-9-CM: 401.9  11/9/2021 - Present        Tobacco abuse ICD-10-CM: Z72.0  ICD-9-CM: 305.1  11/9/2021 - Present        Osteoporosis ICD-10-CM: M81.0  ICD-9-CM: 733.00  11/9/2021 - Present        Community acquired pneumonia ICD-10-CM: J18.9  ICD-9-CM: 486  11/9/2021 - Present        Bronchiectasis ([facility] [address] ICD-10-CM: J47.9  ICD-9-CM: 494.0  11/9/2021 - Present              Medications reviewed  Current Facility-Administered Medications   Medication Dose Route Frequency    dexamethasone (PF) (DECADRON) 10 mg/mL injection 4 mg  4 mg IntraVENous Q12H    ALPRAZolam (XANAX) tablet 1 mg  1 mg Oral Q6H PRN    enoxaparin (LOVENOX) injection 40 mg  40 mg SubCUTAneous Q24H    bisacodyL (DULCOLAX) suppository 10 mg  10 mg Rectal DAILY PRN    docusate (COLACE) 50 mg/5 mL oral liquid 100 mg  100 mg Per G Tube BID    ascorbic acid (vitamin C) (VITAMIN C) tablet 500 mg  500 mg Per G Tube BID    cholecalciferol (VITAMIN D3) (1000 Units /25 mcg) tablet 2,000 Units  2,000 Units Oral DAILY    guaiFENesin ER (MUCINEX) tablet 600 mg  600 mg Oral BID    famotidine (PEPCID) tablet 20 mg  20 mg Oral BID    albuterol (PROVENTIL HFA, VENTOLIN HFA, PROAIR HFA) inhaler 2 Puff  2 Puff Inhalation Q6H RT    budesonide-formoteroL (SYMBICORT) 160-4.5 mcg/actuation HFA inhaler 2 Puff  2 Puff Inhalation BID RT    piperacillin-tazobactam (ZOSYN) 3.375 g in 0.9% sodium chloride (MBP/ADV) 100 mL MBP  3.375 g IntraVENous Q8H    hydrOXYzine pamoate (VISTARIL) capsule 25 mg  25 mg Per G Tube Q6H PRN    guaiFENesin-codeine (ROBITUSSIN AC) 100-10 mg/5 mL solution 5 mL  5 mL Per G Tube Q6H PRN    dextromethorphan (DELSYM) 30 mg/5 mL syrup 30 mg  30 mg Per G Tube Q12H    lisinopriL (PRINIVIL, ZESTRIL) tablet 5 mg  5 mg Oral DAILY    metoprolol tartrate (LOPRESSOR) tablet 50 mg  50 mg Oral BID    sodium chloride (NS) flush 5-40 mL  5-40 mL IntraVENous Q8H    sodium chloride (NS) flush 5-40 mL  5-40 mL IntraVENous PRN    acetaminophen (TYLENOL) tablet 650 mg  650 mg Oral Q6H PRN    Or    acetaminophen (TYLENOL) suppository 650 mg  650 mg Rectal Q6H PRN    polyethylene glycol (MIRALAX) packet 17 g  17 g Oral DAILY PRN    ondansetron (ZOFRAN ODT) tablet 4 mg  4 mg Oral Q8H PRN    Or    ondansetron (ZOFRAN) injection 4 mg  4 mg IntraVENous Q6H PRN    baricitinib (OLUMIANT) tablet 4 mg  4 mg Oral DAILY    dilTIAZem ER (CARDIZEM CD) capsule 120 mg  120 mg Oral DAILY       Review of Systems:   A comprehensive review of systems was negative except for that written in the HPI. Objective:   Physical Exam:     Visit Vitals  BP (!) 151/72   Pulse 94   Temp 97.7 °F (36.5 °C)   Resp 22   Ht 5' 5\" (1.651 m)   Wt 39.9 kg (87 lb 15.4 oz)   SpO2 90%   Breastfeeding No   BMI 14.64 kg/m²    O2 Flow Rate (L/min): 8 l/min O2 Device: Ventimask    Temp (24hrs), Av.3 °F (36.3 °C), Min:96.9 °F (36.1 °C), Max:97.7 °F (36.5 °C)    No intake/output data recorded.  1901 -  0700  In: 1875   Out: 2600 [Urine:2600]    General:   Awake and alert   Lungs: Few rhonchi bilateral bases, no wheeze, not labored   Chest wall:  No tenderness or deformity. Heart:  Regular rate and rhythm, S1, S2 normal, no murmur, click, rub or gallop. Abdomen:   Soft, non-tender. Bowel sounds normal. No masses,  No organomegaly. Peg site dressing adhered to site, mildly erythematous, not purulent . Extremities: Extremities normal, atraumatic, no cyanosis   Pulses: 2+ and symmetric all extremities. Skin: Warm, dry, poor turgor. Neurologic: CNII-XII intact.   No gross focal deficits         Data Review:       Recent Days:  Recent Labs     21  0941   WBC 36.5*   HGB 12.6   HCT 37.5   *     Recent Labs     21  0941   *   K 5.0   CL 94*   CO2 26   *   BUN 26*   CREA 0.61   CA 9.4   ALB 2.4*   TBILI 0.6   ALT 29     No results for input(s): PH, PCO2, PO2, HCO3, FIO2 in the last 72 hours. 24 Hour Results:  No results found for this or any previous visit (from the past 24 hour(s)). XR CHEST PORT   Final Result   Opacities in lungs with interval increase at the right base. XR CHEST PORT   Final Result   Diffuse and predominantly nodular appearing bilateral lower lobe   opacities. In the acute setting pneumonia or aspiration would be considered. Follow-up to resolution to exclude other active disease. .       XR CHEST PORT   Final Result   Significant improvement in appearance of basilar predominant opacities. XR CHEST PORT   Final Result      XR ABD (KUB)   Final Result   No acute process. Constipation. Atherosclerosis. XR CHEST PORT   Final Result   Suspect worsening pneumonia. CTA CHEST W OR W WO CONT   Final Result      No pulmonary emboli. Patchy opacities in the lungs may be due to atelectasis, pneumonia or a   combination of these. Pulmonary emphysema. Atherosclerosis including coronary   arteries. Follow-up as clinically indicated. Please see full report. XR CHEST PORT   Final Result      Pulmonary emphysema. Opacities in the lungs may represent atelectasis, pneumonia   or a combination of these. Follow-up as clinically indicated.            Assessment:  1- COVID-19 with pneumonitis, o2 requirements increase yesterday, remains on ventimask    2- Aspiration pneumonia: zosyn D10/14       3- Acute respiratory failure with hypoxia, onc o2 with ventimask maintaining spo2>93%    4- Anxiety: likely contributing to #3    5- COPD with exacerbation:     6- Atrial fibrillation with RVR, now back and remains in sinus rhythm, on metoprolol    7- Dehydration with hyponatremia, improving volume status, improved to 132    Hypertension    9-Chronic PEG tube feedings due to history of dysphagia, irritation surrounding site        Plan:  Continue zosyn for 4 more days  Continue decadron, olumiant for 14 days  Wean o2  Increase xanax to 1mg Q6H PRN  Wean o2 to room air keeping saturation > 92  Discussed with daughter        Vtep: lovenox  Gip:pepcid  Full code  Dispo: pending course , >48, I believe [facility] is preference, if needs snf will not need auth, pt/ot    Care Plan discussed with: Patient/Family    Disposition: Continued inpatient care    Total time spent with patient: 40 minutes.     Annie Carey MD

## 2021-12-20 NOTE — PROGRESS NOTES
Problem: Self Care Deficits Care Plan (Adult)  Goal: *Acute Goals and Plan of Care (Insert Text)  Description: Pt will be MI sup <> sit in prep for EOB ADLs  Pt will be MI grooming EOB level  Pt will be MI LB dressing EOB/long sit  Pt will be MI sit EOB 10 minutes in prep for self care tasks  Pt will be MI sit<-> prep for toilet transfer LRAD  Pt will be MI BSC progress to standard toilet transfer LRAD  Pt will be MI toileting/toilet transfer/cloth mgmt LRAD  Pt will be IND following UE HEP in prep for self care tasks    Outcome: Not Met     OCCUPATIONAL THERAPY RE-EVALUATION  Patient: Birgit Sheridan (13 y.o. female)  Date: 12/20/2021  Diagnosis: Pneumonia due to COVID-19 virus [U07.1, J12.82]  Atrial fibrillation with RVR ([address] [I48.91]  New onset atrial fibrillation ([address] [I48.91] <principal problem not specified>       Precautions:    Chart, occupational therapy assessment, plan of care, and goals were reviewed. ASSESSMENT  Patient is 78 y/o female came to [facility] with SOB, generalized weakness, entire home is COVID (+) pt not tested and adm 12/8/2021 for new onset Afib, COVID 19 positive, pneumonia 2/2 COVID 19, dehydration with hyponatremia, COPD exacerbation, acute respiratory fialure with hypoxia (at Eval was on 12L/min via ventimask with 35% FIO2 however pt progressed to NC and now at time of RA back on 8L/min ventimask with FIO2 31%), malnutrition,     Pt has hx of COPD, HTN, osteoporosis, tabacco abuse, PEG tube placement 2/2 dysphagia. Pt lives with daughter (has 24/7 care) in one story home with 3/4 steps adelfo rails to enter and is MI for self care (daughter assists with bathing) and MI for functional transfers/mobility using RW. Initial OT evaluation completed on 12/15/2021 during which pt was on 12L/min via ventimask with 35% FIO2 however pt progressed to NC and now at time of RA back on 8L/min ventimask with FIO2 31%.  Pt received semi supine in bed A&O to self, hard to understand through ventimask however agreeable for OT RA 2/2 LOS. Pt currently presents with decreased activity tolerance, generalized weakness (adelfo UE grossly 2-/5), decreased balance anticipated and increased need for assist with self care (min A simple grooming simulated semi supine in bed, total A LB dressing simulated bed level) with all functional transfers held 2/2 pt declining and not feeling well. Pt educated on and completed adelfo UE HEP with AAROM with min assist for 1 set of 10 reps for digit flex/ext and elbow flex/ext and 3 reps for shoulder flexion after which pt stating she was SOB thus further activity held. Pt noted wth SPO2 ranging from 93-95% during treatment session. Pt would benefit from skilled OT services while at [facility] in order to increase safety and independence with self care and functional transfers/mobility. Recommend discharge to SNF when medically appropriate. Other factors to consider for discharge: time since onset, severity of deficits, decline in functional status since evaluation         PLAN :  Recommendations and Planned Interventions: self care training, functional mobility training, therapeutic exercise, balance training, therapeutic activities, endurance activities, patient education, and home safety training    Recommend with staff: assist with bed level self care    Recommend next OT session: EOB self care tasks    Frequency/Duration: Patient will be followed by physical therapy:  2-3x/week to address goals. Recommendation for discharge: (in order for the patient to meet his/her long term goals)  Karsten Bain    This discharge recommendation:  Has been made in collaboration with the attending provider and/or case management    Equipment recommendations for successful discharge (if) home: TBD       SUBJECTIVE:   Patient stated I am cold.      OBJECTIVE DATA SUMMARY:   Hospital course since last seen and reason for reevaluation: LOS, decline in functional status since evaluation    Cognitive/Behavioral Status:  Neurologic State: Alert;Confused  Orientation Level: Oriented to person  Cognition: Decreased attention/concentration     Hearing: Auditory  Auditory Impairment: None    Range of Motion:  AROM: Generally decreased, functional     Strength:  Strength: Generally decreased, functional (jordy UE grossly 2-/5)     ADL Assessment:   Oral Facial Hygiene/Grooming: Minimum assistance (simulated)    Lower Body Dressing: Total assistance (simulated bed level)    ADL Intervention and task modifications:     Grooming  Grooming Assistance: Minimum assistance (simulated)  Position Performed:  (semi supine in bed)  Washing Face: Minimum assistance    Lower Body Dressing Assistance  Socks: Total assistance (dependent) (simulated)    Therapeutic Exercises:   Min A Jordy UE HEP for 1 set of 10 reps for digit flex/ext and elbow flex/ext with pt completing 3 reps for shoulder flexion after which pt stating she was SOB thus further activity held    Pain:  No pain reported    Activity Tolerance:   Poor and observed SOB with activity  Please refer to the flowsheet for vital signs taken during this treatment. After treatment patient left in no apparent distress:   Supine in bed, Call bell within reach, and Bed / chair alarm activated    COMMUNICATION/COLLABORATION:   The patients plan of care was discussed with: Physical therapy assistantTha Lee  Time Calculation: 13 mins

## 2021-12-21 NOTE — PROGRESS NOTES
Comprehensive Nutrition Assessment    Type and Reason for Visit: Reassess (goals)    Nutrition Recommendations/Plan:   Continue w/ current TF  Order provides 1410kcal (101%), 60g pro (103%), and 1215ml (101%)     Document TF rate, formula, flushes, and BM in I/O's    Nutrition Assessment:  COVID +. On 3L NC. On last admit, 11/14, pt received PEG tube and tolerated bolus feeds, with Jevity 1.5, prior to d/c. Pt now readmitted. She is tolerating tube feeding well w/o complications. Patient still requiring Ventimask to meet respiratory needs. Labs: 12/19 Na 129, K 5.0, glucose 120, BUN 26, Alb 2.4. Meds:Zosyn, vitamin C, Vitamin D3, Pepcid, docusate. Malnutrition Assessment:  Malnutrition Status:  Severe malnutrition    Context:  Chronic illness     Findings of the 6 clinical characteristics of malnutrition:   Energy Intake:  No significant decrease in energy intake  Weight Loss:  No significant weight loss     Body Fat Loss:  7 - Severe body fat loss, Orbital,Triceps   Muscle Mass Loss:  7 - Severe muscle mass loss, Calf (gastrocnemius),Clavicles (pectoralis &deltoids),Hand (interosseous),Temples (temporalis)  Fluid Accumulation:  No significant fluid accumulation,          Estimated Daily Nutrient Needs:  Energy (kcal): 1400kcal (35kcal/kg); Weight Used for Energy Requirements: Current  Protein (g): 59g (1.5k/kg); Weight Used for Protein Requirements: Current  Fluid (ml/day): 1200ml (30kcal/kg); Method Used for Fluid Requirements: 1 ml/kcal      Nutrition Related Findings:  NFPE not performed 2/2 COVID precautions. NFPE from 11/14 showing severe wasting. +peg tube. No gastric residuals reported. No n/v. No c/d. Last BM formed 12/21. Wounds:    None       Current Nutrition Therapies:   DIET NPO  ADULT TUBE FEEDING PEG; Standard with Fiber; Delivery Method: Bolus; Bolus Frequency: 4 Times Daily;  Bolus Volume (mL): 235; Water Flush Volume (mL): 125; Water Flush Frequency: Q 6 hours      Anthropometric Measures:  · Height:  5' 5\" (165.1 cm)  · Current Body Wt:  39.9 kg (87 lb 15.4 oz)   · Usual Body Wt:  38.6 kg (85 lb)     · Ideal Body Wt:  125 lbs:  70.4 %   · BMI Category:  Underweight (BMI less than 22) age over 72         Nutrition Diagnosis:   · Increased nutrient needs related to increased demand for energy/nutrients as evidenced by nutrition support-enteral nutrition,BMI,severe muscle loss,severe loss of subcutaneous fat      Nutrition Interventions:   Food and/or Nutrient Delivery: Continue tube feeding,Continue NPO  Nutrition Education and Counseling: Education not indicated  Coordination of Nutrition Care: Continue to monitor while inpatient      Goals:  Pt to tolerate enteral feedings via PEG w/o s/sx aspiration. wt gain 0.5kg per week.          Nutrition Monitoring and Evaluation:   Behavioral-Environmental Outcomes: None identified  Food/Nutrient Intake Outcomes: Enteral nutrition intake/tolerance  Physical Signs/Symptoms Outcomes: GI status,Biochemical data,Weight      Discharge Planning:    Enteral nutrition     Electronically signed by Ann Marie Borden RD on 12/21/2021 at 3:05 PM    Contact: [phone]

## 2021-12-21 NOTE — PROGRESS NOTES
CM reviewed chart and spoke to primary physician. Patient still requiring Ventimask to meet respiratory needs. CM noted PT/OT recommendations of SNF. CM spoke with patient's daughter. Daughter states that she does not want patient to go to a SNF or IRF at discharge no matter what is recommended. CM will continue to update MARGARET and [place]. Current Dispo: Home with HH.

## 2021-12-21 NOTE — PROGRESS NOTES
Pulmonary, Critical Care Note    Name: Volodymyr Nam MRN: [id number]   : 1945 Hospital: [facility]   Date: 2021  Admission date: 2021 Hospital Day: 14       Subjective/Interval History:   Patient seen on the medical floor. She is awake able to sit up and walk to the bedside commode she looks very weak and frail.  No particular complaints still looks very frail. FiO2 down to 35%    Hospital Problems  Date Reviewed: 2021          Codes Class Noted POA    New onset atrial fibrillation (HCC) ICD-10-CM: I48.91  ICD-9-CM: 427.31  2021 Unknown        Atrial fibrillation with RVR (HCC) ICD-10-CM: I48.91  ICD-9-CM: 427.31  2021 Unknown        Pneumonia due to COVID-19 virus ICD-10-CM: U07.1, J12.82  ICD-9-CM: 480.8, 079.89  2021 Unknown              IMPRESSION:   1. Acute hypoxic respiratory failure now on Ventimask  2. COVID-19 pneumonitis  3. Chronic PEG tube with feedings  4. Probable aspiration pneumonia more noticeable infiltrate left base  5. Worsening infiltrates in the bases possible causes include vascular congestion from IV fluids worsening Covid pneumonia mucous plugging from severe COPD or aspiration  6. Worsening leukocytosis questionably aspiration event  7. COPD with bronchospasm improved  8. Hypophosphatemia to be repleted  9. Atrial fibrillation converted to sinus  10. Malnutrition  Body mass index is 14.64 kg/m². RECOMMENDATIONS/PLAN:   1. Will continue to wean oxygen per protocol on Ventimask still hypoxic  2. Continue  Decadron baricitinib has completed Remdesivir  3. Continue oxygen saturation to maintain above 90%   4. Continue Symbicort and albuterol on a set schedule  5. Chest x-ray shows congestive changes will give 1 dose of Lasix  6. Continue tube feedings with water flushes  7. Agree empiric IV Zosyn  8. Complaining of back pain continue with the Tylenol  9.  PT OT evaluate          [x] High complexity decision making was performed  [x] See my orders for details      Subjective/Initial History:     I was asked by Zulma Kussmaul, MD to see Sandeep Barnard  a 77 y.o.    female in consultation for a chief complaint of acute hypoxic respiratory failure Covid pneumonia worsening infiltrate    No Known Allergies     MAR reviewed and pertinent medications noted or modified as needed     Current Facility-Administered Medications   Medication    dexamethasone (PF) (DECADRON) 10 mg/mL injection 4 mg    ALPRAZolam (XANAX) tablet 1 mg    enoxaparin (LOVENOX) injection 40 mg    bisacodyL (DULCOLAX) suppository 10 mg    docusate (COLACE) 50 mg/5 mL oral liquid 100 mg    ascorbic acid (vitamin C) (VITAMIN C) tablet 500 mg    cholecalciferol (VITAMIN D3) (1000 Units /25 mcg) tablet 2,000 Units    guaiFENesin ER (MUCINEX) tablet 600 mg    famotidine (PEPCID) tablet 20 mg    albuterol (PROVENTIL HFA, VENTOLIN HFA, PROAIR HFA) inhaler 2 Puff    budesonide-formoteroL (SYMBICORT) 160-4.5 mcg/actuation HFA inhaler 2 Puff    piperacillin-tazobactam (ZOSYN) 3.375 g in 0.9% sodium chloride (MBP/ADV) 100 mL MBP    hydrOXYzine pamoate (VISTARIL) capsule 25 mg    guaiFENesin-codeine (ROBITUSSIN AC) 100-10 mg/5 mL solution 5 mL    dextromethorphan (DELSYM) 30 mg/5 mL syrup 30 mg    lisinopriL (PRINIVIL, ZESTRIL) tablet 5 mg    metoprolol tartrate (LOPRESSOR) tablet 50 mg    sodium chloride (NS) flush 5-40 mL    sodium chloride (NS) flush 5-40 mL    acetaminophen (TYLENOL) tablet 650 mg    Or    acetaminophen (TYLENOL) suppository 650 mg    polyethylene glycol (MIRALAX) packet 17 g    ondansetron (ZOFRAN ODT) tablet 4 mg    Or    ondansetron (ZOFRAN) injection 4 mg    baricitinib (OLUMIANT) tablet 4 mg    dilTIAZem ER (CARDIZEM CD) capsule 120 mg      Patient PCP: Abdon Spencer MD  PM:  has a past medical history of COPD (chronic obstructive pulmonary disease) ([address] Essential hypertension, Family history of skin cancer, Osteoporosis, and Tobacco abuse. PSH:   has a past surgical history that includes hx mohs procedure (2018). FHX: family history includes Hypertension in her mother. SHX:  reports that she has been smoking. She started smoking about 60 years ago. She has a 60.00 pack-year smoking history. She has never used smokeless tobacco.  Systemic review not reliable from her she does carry on a conversation but she contradicts herself several times while I am talking with her    Objective:     Vital Signs: Telemetry:    normal sinus rhythm Intake/Output:   Visit Vitals  BP (!) 141/61 (BP 1 Location: Right upper arm, BP Patient Position: At rest;Supine)   Pulse 69   Temp 97.5 °F (36.4 °C)   Resp 23   Ht 5' 5\" (1.651 m)   Wt 39.9 kg (87 lb 15.4 oz)   SpO2 94%   Breastfeeding No   BMI 14.64 kg/m²       Temp (24hrs), Av.8 °F (36.6 °C), Min:97.5 °F (36.4 °C), Max:98 °F (36.7 °C)        O2 Device: Ventimask O2 Flow Rate (L/min): 10 l/min       Wt Readings from Last 4 Encounters:   21 39.9 kg (87 lb 15.4 oz)   21 36.8 kg (81 lb 3.2 oz)   21 45.4 kg (100 lb)          Intake/Output Summary (Last 24 hours) at 2021 1042  Last data filed at 2021 0615  Gross per 24 hour   Intake 1220 ml   Output 600 ml   Net 620 ml       Last shift:      No intake/output data recorded. Last 3 shifts: 1901 -  0700  In: 1965   Out: 1300 [Urine:1300]       Physical Exam:   General:  female; thin frail ill-appearing elderly female  HEENT: NCAT,   Eyes: anicteric; conjunctiva clear extraocular movements intact  Neck: no nodes, neck veins visible but not engorged, no accessory MM use. Chest: no deformity,   Cardiac: Regular rate and rhythm  Lungs: Poor breath sounds but slightly improved rales in the bases no wheezing today  Abd: Thin soft positive bowel sounds PEG tube in place  Ext: no edema; no joint swelling;  No clubbing  : clear urine  Neuro: Awake alert speech is very hesitant and slow moves all 4 extremities is able to get from the bed to the bedside commode  Psych- no agitation, oriented to person  Skin: warm, dry, no cyanosis;   Pulses: Brachial and radial pulses intact  Capillary: Normal capillary refill    Labs:    Recent Labs     12/19/21  0941   WBC 36.5*   HGB 12.6   *     Recent Labs     12/19/21  0941   *   K 5.0   CL 94*   CO2 26   *   BUN 26*   CREA 0.61   CA 9.4   ALB 2.4*   ALT 29   12/13 on 30% Ventimask oxygen saturation 92 to 93%  12/12 50% Ventimask with oxygen saturation 9 8%  Covid antigen positive  Procalcitonin 0.11, 0.16, 0.27    CRP 0.63, 1.51  Lab Results   Component Value Date/Time    Culture result: No growth 6 days 12/08/2021 02:05 AM   No results found for: TSH, TSHEXT, TSHEXT    Imaging:    CXR Results  (Last 48 hours)               12/20/21 0856  XR CHEST PORT Final result    Impression:  Opacities in lungs with interval increase at the right base. Narrative:  Chest, frontal view, 12/20/2021       History: CHF. Comparison: Including chest 12/18/2021. Findings: The cardiac silhouette is stable. Lungs are hyperexpanded. Apical   pleural thickening is noted. No hydrostatic edema is evident. Opacities in the   lungs are again noted with interval increase in the right base. No pleural   effusion or pneumothorax is identified. The osseous structures are stable. Results from [place] encounter on 12/08/21    XR CHEST PORT    Narrative  Chest, frontal view, 12/20/2021    History: CHF. Comparison: Including chest 12/18/2021. Findings: The cardiac silhouette is stable. Lungs are hyperexpanded. Apical  pleural thickening is noted. No hydrostatic edema is evident. Opacities in the  lungs are again noted with interval increase in the right base. No pleural  effusion or pneumothorax is identified. The osseous structures are stable.     Impression  Opacities in lungs with interval increase at the right base. XR CHEST PORT    Narrative  HISTORY:  worsening respiratory assessment    TECHNIQUE:  XR CHEST PORT    COMPARISON: 12/17/2021  LIMITATIONS: None    TUBES/LINES: None    LUNG PARENCHYMA: Diffuse nodular opacities particularly throughout bilateral  lower lobes similar to the prior  TRACHEA/BRONCHI: Normal  PULMONARY VESSELS: Normal  PLEURA: Normal  HEART: Normal  AORTIC SHADOW: Calcified. MEDIASTINUM: Normal  BONE/SOFT TISSUES: No acute abnormality. OTHER: None    Impression  Diffuse and predominantly nodular appearing bilateral lower lobe  opacities. In the acute setting pneumonia or aspiration would be considered. Follow-up to resolution to exclude other active disease. .      XR CHEST PORT    Narrative  Examination: XR CHEST PORT    History: covid    Comparison: Chest radiograph 12/13/2021    FINDINGS:    Single frontal portable view of the chest. Improved aeration of the lungs with  faint persistent basilar opacities. No radiographically evident pneumothorax. No  significant pleural effusion is evident. The cardiac silhouette is normal in  size. Atherosclerosis and tortuosity the thoracic aorta. Osseous structures  appear unchanged. Diffuse osseous demineralization. Probable gastrostomy tube  projecting over the left upper quadrant of the abdomen. Impression  Significant improvement in appearance of basilar predominant opacities. Results from [place] encounter on 12/08/21    CTA CHEST W OR W WO CONT    Narrative  CTA chest with intravenous contrast, 80 cc Isovue-370, 3-D MIP images    Dose reduction: All CT scans at this facility are performed using dose reduction  optimization techniques as appropriate to a performed exam including the  following: Automated exposure control, adjustments of the mA and/or kV according  to patient size, or use of iterative reconstruction technique.     INDICATION: Hypoxic    COMPARISON: 11/9/2021    FINDINGS:    No pulmonary emboli are identified. No aneurysm or dissection of thoracic aorta. Atherosclerosis including coronary arteries. Slightly prominent mediastinal and  hilar nodes may be reactive. No pleural or pericardial effusions. Partially  visualized left renal lesions are too small to characterize, may represent cysts  or other. Emphysematous changes patchy bilateral opacities in the lungs. Mild  peribronchial thickening. Areas of mild mucus plugging. No pneumothorax. No  acute fracture in the visualized bony structures. Degenerative changes  visualized upper lumbar spine. Impression  No pulmonary emboli. Patchy opacities in the lungs may be due to atelectasis, pneumonia or a  combination of these. Pulmonary emphysema. Atherosclerosis including coronary  arteries. Follow-up as clinically indicated. Please see full report. Discussion: Worsening infiltrate on chest x-ray may represent some congestion from IV fluid administration. She has been given 1 dose of Lasix will discontinue IV fluids continue her tube feedings. She has Covid 19 antigen positive with pneumonitis consistent with Covid is on Remdesivir Decadron and baricitinib would continue them. She has a chronic PEG tube in place for tube feedings for failure to thrive appears very poorly nutrition with protein wasting. She has significant wheezing is getting as needed albuterol and averaging only twice a day will place it every 6 hours and add Symbicort which she was on at home. She is on Decadron which will give her steroids for her COPD as well  12/13 may be slightly improved FiO2 has been dropped to 35%. Urine output not recorded yesterday but she states she did have significant diuresis after Lasix. Creatinine stable we will repeat Lasix today.   Phosphorus is low to be repleted  ·     Cathleen Estrella MD

## 2021-12-21 NOTE — PROGRESS NOTES
Hospitalist Progress Note               Daily Progress Note: 12/21/2021      Subjective: The patient is seen for follow up. She is a 67-year-old female with history of COPD and hypertension, brought to the ED overnight with shortness of breath and generalized weakness. She has had cough as well. Arrival she had oxygen saturations in the 80s on room air. She tested positive for COVID-19. Multiple family members are also positive. CTA was negative for PE. She was also found to have rapid atrial fibrillation which is new for her    Patient is unvaccinated for Covid. She had been sick for 2 or 3 days    Patient was admitted, started on Remdesivir and steroids along with baricitinib    Patient has continued to require a Ventimask after initially showing some improvement in her oxygenation    She developed a new infiltrate at the right lung base concerning for aspiration she was started on Zosyn for this    Patient is on chronic tube feeds at home and is n.p.o.  -------    Patient seen today for follow-up. She continues on a 35% Ventimask at this time.     She continues to look extremely frail, has a loose congested cough    Problem List:  Problem List as of 12/21/2021 Date Reviewed: 11/12/2021          Codes Class Noted - Resolved    New onset atrial fibrillation ([facility] ICD-10-CM: I48.91  ICD-9-CM: 427.31  12/8/2021 - Present        Atrial fibrillation with RVR ([facility] ICD-10-CM: I48.91  ICD-9-CM: 427.31  12/8/2021 - Present        Pneumonia due to COVID-19 virus ICD-10-CM: U07.1, J12.82  ICD-9-CM: 480.8, 079.89  12/8/2021 - Present        Dysphagia ICD-10-CM: R13.10  ICD-9-CM: 787.20  11/10/2021 - Present        Failure to thrive (child) ICD-10-CM: R62.51  ICD-9-CM: 783.41  11/10/2021 - Present        Severe protein-calorie malnutrition ([facility] ICD-10-CM: E43  ICD-9-CM: 262  11/10/2021 - Present        Unintentional weight loss ICD-10-CM: R63.4  ICD-9-CM: 783.21  11/10/2021 - Present        COPD with acute exacerbation ([facility] ICD-10-CM: J44.1  ICD-9-CM: 491.21  11/9/2021 - Present        Acute respiratory failure with hypoxia [facility]) ICD-10-CM: J96.01  ICD-9-CM: 518.81  11/9/2021 - Present        Hypertension ICD-10-CM: I10  ICD-9-CM: 401.9  11/9/2021 - Present        Tobacco abuse ICD-10-CM: Z72.0  ICD-9-CM: 305.1  11/9/2021 - Present        Osteoporosis ICD-10-CM: M81.0  ICD-9-CM: 733.00  11/9/2021 - Present        Community acquired pneumonia ICD-10-CM: J18.9  ICD-9-CM: 410  11/9/2021 - Present        Bronchiectasis ([facility] ICD-10-CM: J47.9  ICD-9-CM: 494.0  11/9/2021 - Present              Medications reviewed  Current Facility-Administered Medications   Medication Dose Route Frequency    dexamethasone (PF) (DECADRON) 10 mg/mL injection 4 mg  4 mg IntraVENous Q12H    ALPRAZolam (XANAX) tablet 1 mg  1 mg Oral Q6H PRN    enoxaparin (LOVENOX) injection 40 mg  40 mg SubCUTAneous Q24H    bisacodyL (DULCOLAX) suppository 10 mg  10 mg Rectal DAILY PRN    docusate (COLACE) 50 mg/5 mL oral liquid 100 mg  100 mg Per G Tube BID    ascorbic acid (vitamin C) (VITAMIN C) tablet 500 mg  500 mg Per G Tube BID    cholecalciferol (VITAMIN D3) (1000 Units /25 mcg) tablet 2,000 Units  2,000 Units Oral DAILY    guaiFENesin ER (MUCINEX) tablet 600 mg  600 mg Oral BID    famotidine (PEPCID) tablet 20 mg  20 mg Oral BID    albuterol (PROVENTIL HFA, VENTOLIN HFA, PROAIR HFA) inhaler 2 Puff  2 Puff Inhalation Q6H RT    budesonide-formoteroL (SYMBICORT) 160-4.5 mcg/actuation HFA inhaler 2 Puff  2 Puff Inhalation BID RT    piperacillin-tazobactam (ZOSYN) 3.375 g in 0.9% sodium chloride (MBP/ADV) 100 mL MBP  3.375 g IntraVENous Q8H    hydrOXYzine pamoate (VISTARIL) capsule 25 mg  25 mg Per G Tube Q6H PRN    guaiFENesin-codeine (ROBITUSSIN AC) 100-10 mg/5 mL solution 5 mL  5 mL Per G Tube Q6H PRN    dextromethorphan (DELSYM) 30 mg/5 mL syrup 30 mg  30 mg Per G Tube Q12H    lisinopriL (PRINIVIL, ZESTRIL) tablet 5 mg  5 mg Oral DAILY    metoprolol tartrate (LOPRESSOR) tablet 50 mg  50 mg Oral BID    sodium chloride (NS) flush 5-40 mL  5-40 mL IntraVENous Q8H    sodium chloride (NS) flush 5-40 mL  5-40 mL IntraVENous PRN    acetaminophen (TYLENOL) tablet 650 mg  650 mg Oral Q6H PRN    Or    acetaminophen (TYLENOL) suppository 650 mg  650 mg Rectal Q6H PRN    polyethylene glycol (MIRALAX) packet 17 g  17 g Oral DAILY PRN    ondansetron (ZOFRAN ODT) tablet 4 mg  4 mg Oral Q8H PRN    Or    ondansetron (ZOFRAN) injection 4 mg  4 mg IntraVENous Q6H PRN    dilTIAZem ER (CARDIZEM CD) capsule 120 mg  120 mg Oral DAILY       Review of Systems:   A comprehensive review of systems was negative except for that written in the HPI. Objective:   Physical Exam:     Visit Vitals  BP (!) 169/67 (BP 1 Location: Left upper arm, BP Patient Position: At rest;Semi fowlers)   Pulse (!) 101   Temp 97.4 °F (36.3 °C)   Resp 23   Ht 5' 5\" (1.651 m)   Wt 39.9 kg (87 lb 15.4 oz)   SpO2 90%   Breastfeeding No   BMI 14.64 kg/m²    O2 Flow Rate (L/min): 10 l/min O2 Device: Ventimask    Temp (24hrs), Av.8 °F (36.6 °C), Min:97.4 °F (36.3 °C), Max:98 °F (36.7 °C)    No intake/output data recorded.  1901 -  0700  In: 1965   Out: 1300 [Urine:1300]    General:   Frail and cachectic in appearance   Lungs: Few rhonchi bilateral   Chest wall:  No tenderness or deformity. Heart:  Regular rate and rhythm, S1, S2 normal, no murmur, click, rub or gallop. Abdomen:   Soft, non-tender. Bowel sounds normal. No masses,  No organomegaly. Extremities: Extremities normal, atraumatic, no cyanosis or edema. Pulses: 2+ and symmetric all extremities. Skin: Skin color, texture, turgor normal. No rashes or lesions   Neurologic: CNII-XII intact.   No gross focal deficits         Data Review:       Recent Days:  Recent Labs     21  0941   WBC 36.5*   HGB 12.6   HCT 37.5   *     Recent Labs     21  0941   *   K 5.0   CL 94*   CO2 26   GLU 120*   BUN 26*   CREA 0.61   CA 9.4   ALB 2.4*   TBILI 0.6   ALT 29     No results for input(s): PH, PCO2, PO2, HCO3, FIO2 in the last 72 hours. 24 Hour Results:  No results found for this or any previous visit (from the past 24 hour(s)). XR CHEST PORT   Final Result   Opacities in lungs with interval increase at the right base. XR CHEST PORT   Final Result   Diffuse and predominantly nodular appearing bilateral lower lobe   opacities. In the acute setting pneumonia or aspiration would be considered. Follow-up to resolution to exclude other active disease. .       XR CHEST PORT   Final Result   Significant improvement in appearance of basilar predominant opacities. XR CHEST PORT   Final Result      XR ABD (KUB)   Final Result   No acute process. Constipation. Atherosclerosis. XR CHEST PORT   Final Result   Suspect worsening pneumonia. CTA CHEST W OR W WO CONT   Final Result      No pulmonary emboli. Patchy opacities in the lungs may be due to atelectasis, pneumonia or a   combination of these. Pulmonary emphysema. Atherosclerosis including coronary   arteries. Follow-up as clinically indicated. Please see full report. XR CHEST PORT   Final Result      Pulmonary emphysema. Opacities in the lungs may represent atelectasis, pneumonia   or a combination of these. Follow-up as clinically indicated.            Assessment:  COVID-19 with pneumonitis,  severe disease    Acute respiratory failure with hypoxia    Suspected aspiration pneumonia right lower lobe    COPD with exacerbation    Protein calorie malnutrition    Atrial fibrillation with RVR, now back in sinus rhythm    Dehydration with hyponatremia, improved    Hypertension    Chronic PEG tube feedings due to history of dysphagia    Plan:  Continue  Dexamethasone  and baricitinib  Continue Jevity tube feeds  Continue Zosyn  Wean oxygen as tolerated    Care Plan discussed with: Patient/Family     Prognosis appears very poor    Disposition: Continued inpatient care    Total time spent with patient: 30 minutes.     Poppy Randolph MD

## 2021-12-22 NOTE — PROGRESS NOTES
Problem: Risk for Spread of Infection  Goal: Prevent transmission of infectious organism to others  Description: Prevent the transmission of infectious organisms to other patients, staff members, and visitors. Outcome: Progressing Towards Goal     Problem: Patient Education:  Go to Education Activity  Goal: Patient/Family Education  Outcome: Progressing Towards Goal     Problem: Airway Clearance - Ineffective  Goal: Achieve or maintain patent airway  Outcome: Progressing Towards Goal     Problem: Gas Exchange - Impaired  Goal: Absence of hypoxia  Outcome: Progressing Towards Goal  Goal: Promote optimal lung function  Outcome: Progressing Towards Goal     Problem: Breathing Pattern - Ineffective  Goal: Ability to achieve and maintain a regular respiratory rate  Outcome: Progressing Towards Goal     Problem:  Body Temperature -  Risk of, Imbalanced  Goal: Ability to maintain a body temperature within defined limits  Outcome: Progressing Towards Goal  Goal: Complications related to the disease process, condition or treatment will be avoided or minimized  Outcome: Progressing Towards Goal     Problem: Isolation Precautions - Risk of Spread of Infection  Goal: Prevent transmission of infectious organism to others  Outcome: Progressing Towards Goal     Problem: Nutrition Deficits  Goal: Optimize nutrtional status  Outcome: Progressing Towards Goal     Problem: Risk for Fluid Volume Deficit  Goal: Maintain normal heart rhythm  Outcome: Progressing Towards Goal  Goal: Maintain absence of muscle cramping  Outcome: Progressing Towards Goal  Goal: Maintain normal serum potassium, sodium, calcium, phosphorus, and pH  Outcome: Progressing Towards Goal     Problem: Loneliness or Risk for Loneliness  Goal: Demonstrate positive use of time alone when socialization is not possible  Outcome: Progressing Towards Goal     Problem: Fatigue  Goal: Verbalize increase energy and improved vitality  Outcome: Progressing Towards Goal Problem: Patient Education: Go to Patient Education Activity  Goal: Patient/Family Education  Outcome: Progressing Towards Goal     Problem: Falls - Risk of  Goal: *Absence of Falls  Description: Document Briana Ma Fall Risk and appropriate interventions in the flowsheet. Outcome: Progressing Towards Goal  Note: Fall Risk Interventions:  Mobility Interventions: Bed/chair exit alarm    Mentation Interventions: Bed/chair exit alarm    Medication Interventions: Bed/chair exit alarm    Elimination Interventions: Bed/chair exit alarm    History of Falls Interventions: Bed/chair exit alarm         Problem: Patient Education: Go to Patient Education Activity  Goal: Patient/Family Education  Outcome: Progressing Towards Goal     Problem: Pressure Injury - Risk of  Goal: *Prevention of pressure injury  Description: Document Hiro Scale and appropriate interventions in the flowsheet.   Outcome: Progressing Towards Goal     Problem: Patient Education: Go to Patient Education Activity  Goal: Patient/Family Education  Outcome: Progressing Towards Goal     Problem: Patient Education: Go to Patient Education Activity  Goal: Patient/Family Education  Outcome: Progressing Towards Goal     Problem: Patient Education: Go to Patient Education Activity  Goal: Patient/Family Education  Outcome: Progressing Towards Goal

## 2021-12-22 NOTE — PROGRESS NOTES
Skin assessment completed by Jyoti Villarreal RN and Ashley Engel RN. Pt has very fragile skin with minor bruises to bilateral arms. Redness noted to buttocks.

## 2021-12-22 NOTE — PROGRESS NOTES
TRANSFER - IN REPORT:    Verbal report received from MELITON Beasley(name) on Jordan Morales  being received from [facility](unit) for change in patient condition(Respiratory distress)      Report consisted of patients Situation, Background, Assessment and   Recommendations(SBAR). Information from the following report(s) SBAR was reviewed with the receiving nurse. Opportunity for questions and clarification was provided. Assessment completed upon patients arrival to unit and care assumed.

## 2021-12-22 NOTE — PROGRESS NOTES
Patient has been accepted by [address] SOC: 12/23/201.  spoke with patient's daughter, Anant Solo ([phone]) to give her an update on the status of her mother's discharge. Patient will be discharging home tomorrow morning, and will be admitted to hospice when she arrives home. Transportation will be set up and her daughter needs to be notified of  time. Patient is clear to discharge home with home hospice provided by [facility] by [facility]. Nurse   notified.

## 2021-12-22 NOTE — DISCHARGE SUMMARY
Physician Discharge Summary     Patient ID:    Con Distance  [id number]  77 y.o.  1945    Admit date: 12/8/2021    Discharge date : 12/22/2021    Chronic Diagnoses:    Problem List as of 12/22/2021 Date Reviewed: 11/12/2021          Codes Class Noted - Resolved    New onset atrial fibrillation ([facility] ICD-10-CM: I48.91  ICD-9-CM: 427.31  12/8/2021 - Present        Atrial fibrillation with RVR (HCC) ICD-10-CM: I48.91  ICD-9-CM: 427.31  12/8/2021 - Present        Pneumonia due to COVID-19 virus ICD-10-CM: U07.1, J12.82  ICD-9-CM: 480.8, 079.89  12/8/2021 - Present        Dysphagia ICD-10-CM: R13.10  ICD-9-CM: 787.20  11/10/2021 - Present        Failure to thrive (child) ICD-10-CM: R62.51  ICD-9-CM: 783.41  11/10/2021 - Present        Severe protein-calorie malnutrition ([facility] ICD-10-CM: E43  ICD-9-CM: 262  11/10/2021 - Present        Unintentional weight loss ICD-10-CM: R63.4  ICD-9-CM: 783.21  11/10/2021 - Present        COPD with acute exacerbation ([facility] ICD-10-CM: J44.1  ICD-9-CM: 491.21  11/9/2021 - Present        Acute respiratory failure with hypoxia ([facility] ICD-10-CM: J96.01  ICD-9-CM: 518.81  11/9/2021 - Present        Hypertension ICD-10-CM: I10  ICD-9-CM: 401.9  11/9/2021 - Present        Tobacco abuse ICD-10-CM: Z72.0  ICD-9-CM: 305.1  11/9/2021 - Present        Osteoporosis ICD-10-CM: M81.0  ICD-9-CM: 733.00  11/9/2021 - Present        Community acquired pneumonia ICD-10-CM: J18.9  ICD-9-CM: 127  11/9/2021 - Present        Bronchiectasis ([facility] ICD-10-CM: J47.9  ICD-9-CM: 494.0  11/9/2021 - Present          22    Final Diagnoses:   Pneumonia due to COVID-19 virus [U07.1, J12.82]  Atrial fibrillation with RVR ([facility] [I48.91]  New onset atrial fibrillation (HCC) [I48.91]  Suspected aspiration pneumonia right lower lobe     COPD with exacerbation     Protein calorie malnutrition     Atrial fibrillation with RVR, now back in sinus rhythm     Dehydration with hyponatremia, improved     Hypertension     Chronic PEG tube feedings due to history of dysphagia    Metabolic encephalopathy    Adult failure to thrive    Bronchiectasis    Osteoporosis       Reason for Hospitalization:  She is a 67-year-old female with history of COPD and hypertension, brought to the ED overnight with shortness of breath and generalized weakness. She has had cough as well. Arrival she had oxygen saturations in the 80s on room air. She tested positive for COVID-19. Multiple family members are also positive. CTA was negative for PE.     She was also found to have rapid atrial fibrillation which is new for her     Patient is unvaccinated for Covid. She had been sick for 2 or 3 days          Hospital Course:   Patient was admitted, started on Remdesivir and steroids along with baricitinib     Patient has continued to require a Ventimask after initially showing some improvement in her oxygenation     She developed a new infiltrate at the right lung base concerning for aspiration she was started on Zosyn for this    She completed her course of remdesivir     Throughout her stay she remained very frail and weak appearing. Her oxygenation steadily worsened throughout the latter part of her hospital stay    On 12/21 her condition started rapidly worsening. She became more tachypneic and hypoxic and was transitioned to a nonrebreather    She was poorly responsive. She had a very weak and congested cough    I spoke with multiple daughters via conference call and inform them of her worsening condition and very poor prognosis     Options of aggressive care versus a more palliative approach were discussed.   Family chose the latter and wished to bring her home with hospice which is quite reasonable    We are in the process of making arrangements for discharge home with hospice care              Discharge Medications:   Current Discharge Medication List      START taking these medications    Details   morphine (ROXANOL) 100 mg/5 mL (20 mg/mL) concentrated solution 0.5 mL by Per G Tube route every two (2) hours as needed for Pain or Shortness of Breath for up to 3 days. Max Daily Amount: 120 mg.  Qty: 30 mL, Refills: 0  Start date: 12/22/2021, End date: 12/25/2021    Associated Diagnoses: COVID-19; Failure to thrive (child)      LORazepam (INTENSOL) 2 mg/mL concentrated solution Take 0.5 mL by mouth every six (6) hours as needed for Agitation or Anxiety. Max Daily Amount: 8 mg. Qty: 30 mL, Refills: 0  Start date: 12/22/2021    Associated Diagnoses: COVID-19; Failure to thrive (child)         STOP taking these medications       budesonide-formoteroL (SYMBICORT) 160-4.5 mcg/actuation HFAA Comments:   Reason for Stopping:         multivitamin with folic acid (ONE DAILY WITH FOLIC ACID) 304 mcg tab tablet Comments:   Reason for Stopping:         albuterol (PROVENTIL HFA, VENTOLIN HFA, PROAIR HFA) 90 mcg/actuation inhaler Comments:   Reason for Stopping:         famotidine (Pepcid) 40 mg tablet Comments:   Reason for Stopping:         ibandronate (BONIVA) 150 mg tablet Comments:   Reason for Stopping:         metoprolol tartrate (LOPRESSOR) 50 mg tablet Comments:   Reason for Stopping:         amLODIPine (NORVASC) 5 mg tablet Comments:   Reason for Stopping:         lovastatin (MEVACOR) 10 mg tablet Comments:   Reason for Stopping:         ergocalciferol (VITAMIN D2) 50,000 unit capsule Comments:   Reason for Stopping:         ASPIRIN PO Comments:   Reason for Stopping: Follow up Care:    1. Marifer Quiles MD in 1-2 weeks. Please call to set up an appointment shortly after discharge. Diet:  Comfort feeding/tube feeds    Disposition:  Home. Advanced Directive:   FULL    DNR      Discharge Exam:  General:  Alert, cooperative, no distress, appears stated age. Lungs:   Clear to auscultation bilaterally. Chest wall:  No tenderness or deformity.    Heart:  Regular rate and rhythm, S1, S2 normal, no murmur, click, rub or gallop. Abdomen:   Soft, non-tender. Bowel sounds normal. No masses,  No organomegaly. Extremities: Extremities normal, atraumatic, no cyanosis or edema. Pulses: 2+ and symmetric all extremities. Skin: Skin color, texture, turgor normal. No rashes or lesions   Neurologic: CNII-XII intact. No gross sensory or motor deficits        CONSULTATIONS: Pulmonary/Intensive care    Significant Diagnostic Studies:   12/8/2021: BUN 21 mg/dL (H; Ref range: 6 - 20 mg/dL); Calcium 8.0 mg/dL (L; Ref range: 8.5 - 10.1 mg/dL); CO2 25 mmol/L (Ref range: 21 - 32 mmol/L); Creatinine 0.50 mg/dL (L; Ref range: 0.55 - 1.02 mg/dL); Glucose 172 mg/dL (H; Ref range: 65 - 100 mg/dL); HCT 33.7 % (L; Ref range: 35.0 - 47.0 %); HGB 11.1 g/dL (L; Ref range: 11.5 - 16.0 g/dL); Potassium 4.3 mmol/L (Ref range: 3.5 - 5.1 mmol/L); Sodium 127 mmol/L (L; Ref range: 136 - 145 mmol/L)  12/9/2021: BUN 22 mg/dL (H; Ref range: 6 - 20 mg/dL); Calcium 9.3 mg/dL (Ref range: 8.5 - 10.1 mg/dL); CO2 24 mmol/L (Ref range: 21 - 32 mmol/L); Creatinine 0.58 mg/dL (Ref range: 0.55 - 1.02 mg/dL); Glucose 121 mg/dL (H; Ref range: 65 - 100 mg/dL); HCT 38.8 % (Ref range: 35.0 - 47.0 %); HGB 12.8 g/dL (Ref range: 11.5 - 16.0 g/dL); Potassium 4.6 mmol/L (Ref range: 3.5 - 5.1 mmol/L); Sodium 134 mmol/L (L; Ref range: 136 - 145 mmol/L)  No results for input(s): WBC, HGB, HCT, PLT, HGBEXT, HCTEXT, PLTEXT in the last 72 hours. No results for input(s): NA, K, CL, CO2, BUN, CREA, GLU, CA, MG, PHOS, URICA in the last 72 hours. No results for input(s): ALT, AP, TBIL, TBILI, TP, ALB, GLOB, GGT, AML, LPSE in the last 72 hours. No lab exists for component: SGOT, GPT, AMYP, HLPSE  No results for input(s): INR, PTP, APTT, INREXT in the last 72 hours. No results for input(s): FE, TIBC, PSAT, FERR in the last 72 hours. No results for input(s): PH, PCO2, PO2 in the last 72 hours. No results for input(s): CPK, CKMB in the last 72 hours.     No lab exists for component: TROPONINI  Lab Results   Component Value Date/Time    Glucose (POC) 231 (H) 12/09/2021 08:21 PM    Glucose (POC) 231 (H) 12/09/2021 12:05 PM    Glucose (POC) 121 (H) 12/09/2021 08:24 AM    Glucose (POC) 173 (H) 11/10/2021 07:41 AM       Discharge time spent 35 minutes    Signed:  Eddie Tran MD  12/22/2021  1:44 PM

## 2021-12-22 NOTE — PROGRESS NOTES
Pulmonary, Critical Care Note    Name: Analilia Frederick MRN: [id number]   : 1945 Hospital: [address] [place] [address]   Date: 2021  Admission date: 2021 Hospital Day: 15       Subjective/Interval History:   Patient seen on the medical floor. She is awake able to sit up and walk to the bedside commode she looks very weak and frail.  No particular complaints still looks very frail. FiO2 down to 35%    Hospital Problems  Date Reviewed: 2021          Codes Class Noted POA    New onset atrial fibrillation (HCC) ICD-10-CM: I48.91  ICD-9-CM: 427.31  2021 Unknown        Atrial fibrillation with RVR (HCC) ICD-10-CM: I48.91  ICD-9-CM: 427.31  2021 Unknown        Pneumonia due to COVID-19 virus ICD-10-CM: U07.1, J12.82  ICD-9-CM: 480.8, 079.89  2021 Unknown              IMPRESSION:   1. Acute hypoxic respiratory failure now on 100% nonrebreather mask   2. COVID-19 pneumonitis  3. Chronic PEG tube with feedings  4. Probable aspiration pneumonia more noticeable infiltrate left base  5. Worsening infiltrates in the bases possible causes include vascular congestion from IV fluids worsening Covid pneumonia mucous plugging from severe COPD or aspiration  6. Worsening leukocytosis questionably aspiration event  7. COPD with bronchospasm improved  8. Atrial fibrillation converted to sinus  9. Malnutrition she is chronically on tube feeds at home  Body mass index is 14.64 kg/m². RECOMMENDATIONS/PLAN:   1. Patient has lethargic awake but very agitated 100% nonrebreather mask trying to remove the mass not tolerating well will change to noninvasive ventilator BiPAP machine will get arterial blood gases will repeat chest x-ray  2. Continue  Decadron baricitinib has completed Remdesivir  3. Patient is chronically at home tube feeds  4. Continue oxygen saturation to maintain above 90%   5. Continue Symbicort and albuterol on a set schedule  6.  Chest x-ray shows congestive changes will give 1 dose of Lasix  7. Continue tube feedings with water flushes  8. Agree empiric IV Zosyn  9. Complaining of back pain continue with the Tylenol  10. PT OT evaluate  11. Transfer to ICU time spent 30 minutes discussed with RT and nurse          [x] High complexity decision making was performed  [x] See my orders for details      Subjective/Initial History:     I was asked by Eldon Alexander MD to see Ruchi Rizzo  a 77 y.o.    female in consultation for a chief complaint of acute hypoxic respiratory failure Covid pneumonia worsening infiltrate    No Known Allergies     MAR reviewed and pertinent medications noted or modified as needed     Current Facility-Administered Medications   Medication    dexamethasone (PF) (DECADRON) 10 mg/mL injection 4 mg    ALPRAZolam (XANAX) tablet 1 mg    enoxaparin (LOVENOX) injection 40 mg    bisacodyL (DULCOLAX) suppository 10 mg    docusate (COLACE) 50 mg/5 mL oral liquid 100 mg    ascorbic acid (vitamin C) (VITAMIN C) tablet 500 mg    cholecalciferol (VITAMIN D3) (1000 Units /25 mcg) tablet 2,000 Units    guaiFENesin ER (MUCINEX) tablet 600 mg    famotidine (PEPCID) tablet 20 mg    albuterol (PROVENTIL HFA, VENTOLIN HFA, PROAIR HFA) inhaler 2 Puff    budesonide-formoteroL (SYMBICORT) 160-4.5 mcg/actuation HFA inhaler 2 Puff    piperacillin-tazobactam (ZOSYN) 3.375 g in 0.9% sodium chloride (MBP/ADV) 100 mL MBP    hydrOXYzine pamoate (VISTARIL) capsule 25 mg    guaiFENesin-codeine (ROBITUSSIN AC) 100-10 mg/5 mL solution 5 mL    dextromethorphan (DELSYM) 30 mg/5 mL syrup 30 mg    lisinopriL (PRINIVIL, ZESTRIL) tablet 5 mg    metoprolol tartrate (LOPRESSOR) tablet 50 mg    sodium chloride (NS) flush 5-40 mL    sodium chloride (NS) flush 5-40 mL    acetaminophen (TYLENOL) tablet 650 mg    Or    acetaminophen (TYLENOL) suppository 650 mg    polyethylene glycol (MIRALAX) packet 17 g    ondansetron (ZOFRAN ODT) tablet 4 mg    Or    ondansetron (ZOFRAN) injection 4 mg    dilTIAZem ER (CARDIZEM CD) capsule 120 mg      Patient PCP: Mary Rossi MD  PMH:  has a past medical history of COPD (chronic obstructive pulmonary disease) ([address] Essential hypertension, Family history of skin cancer, Osteoporosis, and Tobacco abuse. PSH:   has a past surgical history that includes hx mohs procedure (2018). FHX: family history includes Hypertension in her mother. SHX:  reports that she has been smoking. She started smoking about 60 years ago. She has a 60.00 pack-year smoking history. She has never used smokeless tobacco.  Systemic review not reliable from her she does carry on a conversation but she contradicts herself several times while I am talking with her    Objective:     Vital Signs: Telemetry:    normal sinus rhythm Intake/Output:   Visit Vitals  /63   Pulse 96   Temp 97.6 °F (36.4 °C)   Resp 22   Ht 5' 5\" (1.651 m)   Wt 39.9 kg (87 lb 15.4 oz)   SpO2 92%   Breastfeeding No   BMI 14.64 kg/m²       Temp (24hrs), Av.5 °F (36.4 °C), Min:97.4 °F (36.3 °C), Max:97.6 °F (36.4 °C)        O2 Device: BIPAP O2 Flow Rate (L/min): 15 l/min       Wt Readings from Last 4 Encounters:   21 39.9 kg (87 lb 15.4 oz)   21 36.8 kg (81 lb 3.2 oz)   21 45.4 kg (100 lb)          Intake/Output Summary (Last 24 hours) at 2021 1012  Last data filed at 2021 1845  Gross per 24 hour   Intake 1020 ml   Output 600 ml   Net 420 ml       Last shift:      No intake/output data recorded. Last 3 shifts: 1 -  0700  In: 1270   Out: 600 [Urine:600]       Physical Exam:   General:  female; thin frail ill-appearing elderly female  HEENT: NCAT,   Eyes: anicteric; conjunctiva clear extraocular movements intact  Neck: no nodes, neck veins visible but not engorged, no accessory MM use.   Chest: no deformity,   Cardiac: Regular rate and rhythm  Lungs: Poor breath sounds but slightly improved rales in the bases no wheezing today  Abd: Thin soft positive bowel sounds PEG tube in place  Ext: no edema; no joint swelling; No clubbing  : clear urine  Neuro: Awake alert speech is very hesitant and slow moves all 4 extremities is able to get from the bed to the bedside commode  Psych- no agitation, oriented to person  Skin: warm, dry, no cyanosis;   Pulses: Brachial and radial pulses intact  Capillary: Normal capillary refill    Labs:    No results for input(s): WBC, HGB, PLT, INR, APTT, HGBEXT, PLTEXT, INREXT, HGBEXT, PLTEXT, INREXT in the last 72 hours. No lab exists for component: FIB, DDMER  No results for input(s): NA, K, CL, CO2, GLU, BUN, CREA, CA, MG, PHOS, LAC, ALB, TBIL, ALT, AML, LPSE in the last 72 hours. No lab exists for component: SGOT12/13 on 30% Ventimask oxygen saturation 92 to 93%  12/12 50% Ventimask with oxygen saturation 9 8%  Covid antigen positive  Procalcitonin 0.11, 0.16, 0.27    CRP 0.63, 1.51  Lab Results   Component Value Date/Time    Culture result: No growth 6 days 12/08/2021 02:05 AM   No results found for: TSH, TSHEXT, TSHEXT    Imaging:    CXR Results  (Last 48 hours)    None        Results from Hospital Encounter encounter on 12/08/21    XR CHEST PORT    Narrative  Chest, frontal view, 12/20/2021    History: CHF. Comparison: Including chest 12/18/2021. Findings: The cardiac silhouette is stable. Lungs are hyperexpanded. Apical  pleural thickening is noted. No hydrostatic edema is evident. Opacities in the  lungs are again noted with interval increase in the right base. No pleural  effusion or pneumothorax is identified. The osseous structures are stable. Impression  Opacities in lungs with interval increase at the right base.       XR CHEST PORT    Narrative  HISTORY:  worsening respiratory assessment    TECHNIQUE:  XR CHEST PORT    COMPARISON: 12/17/2021  LIMITATIONS: None    TUBES/LINES: None    LUNG PARENCHYMA: Diffuse nodular opacities particularly throughout bilateral  lower lobes similar to the prior  TRACHEA/BRONCHI: Normal  PULMONARY VESSELS: Normal  PLEURA: Normal  HEART: Normal  AORTIC SHADOW: Calcified. MEDIASTINUM: Normal  BONE/SOFT TISSUES: No acute abnormality. OTHER: None    Impression  Diffuse and predominantly nodular appearing bilateral lower lobe  opacities. In the acute setting pneumonia or aspiration would be considered. Follow-up to resolution to exclude other active disease. .      XR CHEST PORT    Narrative  Examination: XR CHEST PORT    History: covid    Comparison: Chest radiograph 12/13/2021    FINDINGS:    Single frontal portable view of the chest. Improved aeration of the lungs with  faint persistent basilar opacities. No radiographically evident pneumothorax. No  significant pleural effusion is evident. The cardiac silhouette is normal in  size. Atherosclerosis and tortuosity the thoracic aorta. Osseous structures  appear unchanged. Diffuse osseous demineralization. Probable gastrostomy tube  projecting over the left upper quadrant of the abdomen. Impression  Significant improvement in appearance of basilar predominant opacities. Results from [place] encounter on 12/08/21    CTA CHEST W OR W WO CONT    Narrative  CTA chest with intravenous contrast, 80 cc Isovue-[id number], 3-D MIP images    Dose reduction: All CT scans at this facility are performed using dose reduction  optimization techniques as appropriate to a performed exam including the  following: Automated exposure control, adjustments of the mA and/or kV according  to patient size, or use of iterative reconstruction technique. INDICATION: Hypoxic    COMPARISON: 11/9/2021    FINDINGS:    No pulmonary emboli are identified. No aneurysm or dissection of thoracic aorta. Atherosclerosis including coronary arteries. Slightly prominent mediastinal and  hilar nodes may be reactive. No pleural or pericardial effusions.  Partially  visualized left renal lesions are too small to characterize, may represent cysts  or other. Emphysematous changes patchy bilateral opacities in the lungs. Mild  peribronchial thickening. Areas of mild mucus plugging. No pneumothorax. No  acute fracture in the visualized bony structures. Degenerative changes  visualized upper lumbar spine. Impression  No pulmonary emboli. Patchy opacities in the lungs may be due to atelectasis, pneumonia or a  combination of these. Pulmonary emphysema. Atherosclerosis including coronary  arteries. Follow-up as clinically indicated. Please see full report. Discussion: Worsening infiltrate on chest x-ray may represent some congestion from IV fluid administration. She has been given 1 dose of Lasix will discontinue IV fluids continue her tube feedings. She has Covid 19 antigen positive with pneumonitis consistent with Covid is on Remdesivir Decadron and baricitinib would continue them. She has a chronic PEG tube in place for tube feedings for failure to thrive appears very poorly nutrition with protein wasting. She has significant wheezing is getting as needed albuterol and averaging only twice a day will place it every 6 hours and add Symbicort which she was on at home. She is on Decadron which will give her steroids for her COPD as well  12/13 may be slightly improved FiO2 has been dropped to 35%. Urine output not recorded yesterday but she states she did have significant diuresis after Lasix. Creatinine stable we will repeat Lasix today.   Phosphorus is low to be repleted  ·     Marisa Xavier MD

## 2021-12-22 NOTE — PROGRESS NOTES
MD notified GRIFFIN that patient's family would like for her to discharge home with hospice today. CM contacted patient's daughter, Rangel Jhaveri ([phone]) to get choice for hospice company. Patient's daughter chose [facility]. Choice letter completed and placed on bedside chart. Referral made via Will Palacio. CM will continue to follow.

## 2021-12-22 NOTE — PROGRESS NOTES
Patient status declining. Currently desatting at 85% on 100% NRB. Using accessory muscles. Patient is extremely congested and coarse. Dr. Sandra Mclaughlin notified by other nursing staff. Respiratory notified and placed on bipap, currently satting at 92%. MD to notify family. Patient transferred to ICU room 280. Report given to L[place]ll. All questions addressed at this time.

## 2021-12-22 NOTE — FAMILY MEETING
12/22/2021    Discussed with patient's daughter and 2 other sisters who were on a conference call at the time. They are all aware of very poor prognosis    At this time, family would like to transition over to hospice care and bring her home ASAP     notified, we will try to make arrangements for discharge later today or in a.m.         Kishor Sparks MD

## 2021-12-22 NOTE — PROGRESS NOTES
Patient currently on OT caseload however patient transferred to ICU from 5W due to medical decline. Will need new OT eval order if/when pt medically stable for evaluation. Thank you.

## 2021-12-23 NOTE — ROUTINE PROCESS
Patient was DNR to go home on hospice today. She was found by respiratory with no pulse and no heartbeat. I have called the supervisor Denice Dubin and will call doctor colleen and her daughter. I just left message with the .

## 2021-12-23 NOTE — DEATH NOTE
Physician Death Summary     Patient ID:    Pamella Rojas  [id number]  77 y.o.  1945    Admit date: 12/8/2021    Death date : 12/23/2021    Chronic Diagnoses:    Problem List as of 12/23/2021 Date Reviewed: 11/12/2021          Codes Class Noted - Resolved    New onset atrial fibrillation ([facility] ICD-10-CM: I48.91  ICD-9-CM: 427.31  12/8/2021 - Present        Atrial fibrillation with RVR ([facility] ICD-10-CM: I48.91  ICD-9-CM: 427.31  12/8/2021 - Present        Pneumonia due to COVID-19 virus ICD-10-CM: U07.1, J12.82  ICD-9-CM: 480.8, 079.89  12/8/2021 - Present        Dysphagia ICD-10-CM: R13.10  ICD-9-CM: 787.20  11/10/2021 - Present        Failure to thrive (child) ICD-10-CM: R62.51  ICD-9-CM: 783.41  11/10/2021 - Present        Severe protein-calorie malnutrition ([facility] ICD-10-CM: E43  ICD-9-CM: 262  11/10/2021 - Present        Unintentional weight loss ICD-10-CM: R63.4  ICD-9-CM: 783.21  11/10/2021 - Present        COPD with acute exacerbation ([facility] ICD-10-CM: J44.1  ICD-9-CM: 491.21  11/9/2021 - Present        Acute respiratory failure with hypoxia (HCC) ICD-10-CM: J96.01  ICD-9-CM: 518.81  11/9/2021 - Present        Hypertension ICD-10-CM: I10  ICD-9-CM: 401.9  11/9/2021 - Present        Tobacco abuse ICD-10-CM: Z72.0  ICD-9-CM: 305.1  11/9/2021 - Present        Osteoporosis ICD-10-CM: M81.0  ICD-9-CM: 733.00  11/9/2021 - Present        Community acquired pneumonia ICD-10-CM: J18.9  ICD-9-CM: 776  11/9/2021 - Present        Bronchiectasis ([facility] ICD-10-CM: J47.9  ICD-9-CM: 494.0  11/9/2021 - Present          22    Final Diagnoses:   Pneumonia due to COVID-19 virus [U07.1, J12.82]  Atrial fibrillation with RVR ([facility] [I48.91]  New onset atrial fibrillation (HCC) [I48.91]  Suspected aspiration pneumonia right lower lobe     COPD with exacerbation     Protein calorie malnutrition     Atrial fibrillation with RVR, now back in sinus rhythm     Dehydration with hyponatremia, improved     Hypertension     Chronic PEG tube feedings due to history of dysphagia    Metabolic encephalopathy    Adult failure to thrive    Bronchiectasis    Osteoporosis       Reason for Hospitalization:  She is a 43-year-old female with history of COPD and hypertension, brought to the ED overnight with shortness of breath and generalized weakness. She has had cough as well. Arrival she had oxygen saturations in the 80s on room air. She tested positive for COVID-19. Multiple family members are also positive. CTA was negative for PE.     She was also found to have rapid atrial fibrillation which is new for her     Patient is unvaccinated for Covid. She had been sick for 2 or 3 days          Hospital Course:   Patient was admitted, started on Remdesivir and steroids along with baricitinib     Patient has continued to require a Ventimask after initially showing some improvement in her oxygenation     She developed a new infiltrate at the right lung base concerning for aspiration she was started on Zosyn for this    She completed her course of remdesivir     Throughout her stay she remained very frail and weak appearing. Her oxygenation steadily worsened throughout the latter part of her hospital stay    On  her condition started rapidly worsening. She became more tachypneic and hypoxic and was transitioned to a nonrebreather    She was poorly responsive. She had a very weak and congested cough    I spoke with multiple daughters via conference call and inform them of her worsening condition and very poor prognosis     Options of aggressive care versus a more palliative approach were discussed. Family chose the latter and wished to bring her home with hospice which is quite reasonable    We are in the process of making arrangements for discharge home with hospice care    Our plan was for discharge home with hospice on the morning of .  However, patient continued to decline and  at around 5 AM on 12/23          Discharge Medications:   Current Discharge Medication List      START taking these medications    Details   morphine (ROXANOL) 100 mg/5 mL (20 mg/mL) concentrated solution 0.5 mL by Per G Tube route every two (2) hours as needed for Pain or Shortness of Breath for up to 3 days. Max Daily Amount: 120 mg.  Qty: 30 mL, Refills: 0  Start date: 12/22/2021, End date: 12/25/2021    Associated Diagnoses: COVID-19; Failure to thrive (child)      LORazepam (INTENSOL) 2 mg/mL concentrated solution Take 0.5 mL by mouth every six (6) hours as needed for Agitation or Anxiety. Max Daily Amount: 8 mg. Qty: 30 mL, Refills: 0  Start date: 12/22/2021    Associated Diagnoses: COVID-19; Failure to thrive (child)         STOP taking these medications       budesonide-formoteroL (SYMBICORT) 160-4.5 mcg/actuation HFAA Comments:   Reason for Stopping:         multivitamin with folic acid (ONE DAILY WITH FOLIC ACID) 393 mcg tab tablet Comments:   Reason for Stopping:         albuterol (PROVENTIL HFA, VENTOLIN HFA, PROAIR HFA) 90 mcg/actuation inhaler Comments:   Reason for Stopping:         famotidine (Pepcid) 40 mg tablet Comments:   Reason for Stopping:         ibandronate (BONIVA) 150 mg tablet Comments:   Reason for Stopping:         metoprolol tartrate (LOPRESSOR) 50 mg tablet Comments:   Reason for Stopping:         amLODIPine (NORVASC) 5 mg tablet Comments:   Reason for Stopping:         lovastatin (MEVACOR) 10 mg tablet Comments:   Reason for Stopping:         ergocalciferol (VITAMIN D2) 50,000 unit capsule Comments:   Reason for Stopping:         ASPIRIN PO Comments:   Reason for Stopping: Follow up Care:    1. Marifer Quiles MD in 1-2 weeks. Please call to set up an appointment shortly after discharge. Diet:  Comfort feeding/tube feeds    Disposition:  Home. Advanced Directive:   FULL    DNR      Discharge Exam:  General:  Alert, cooperative, no distress, appears stated age. Lungs:   Clear to auscultation bilaterally. Chest wall:  No tenderness or deformity. Heart:  Regular rate and rhythm, S1, S2 normal, no murmur, click, rub or gallop. Abdomen:   Soft, non-tender. Bowel sounds normal. No masses,  No organomegaly. Extremities: Extremities normal, atraumatic, no cyanosis or edema. Pulses: 2+ and symmetric all extremities. Skin: Skin color, texture, turgor normal. No rashes or lesions   Neurologic: CNII-XII intact. No gross sensory or motor deficits        CONSULTATIONS: Pulmonary/Intensive care    Significant Diagnostic Studies:   12/8/2021: BUN 21 mg/dL (H; Ref range: 6 - 20 mg/dL); Calcium 8.0 mg/dL (L; Ref range: 8.5 - 10.1 mg/dL); CO2 25 mmol/L (Ref range: 21 - 32 mmol/L); Creatinine 0.50 mg/dL (L; Ref range: 0.55 - 1.02 mg/dL); Glucose 172 mg/dL (H; Ref range: 65 - 100 mg/dL); HCT 33.7 % (L; Ref range: 35.0 - 47.0 %); HGB 11.1 g/dL (L; Ref range: 11.5 - 16.0 g/dL); Potassium 4.3 mmol/L (Ref range: 3.5 - 5.1 mmol/L); Sodium 127 mmol/L (L; Ref range: 136 - 145 mmol/L)  12/9/2021: BUN 22 mg/dL (H; Ref range: 6 - 20 mg/dL); Calcium 9.3 mg/dL (Ref range: 8.5 - 10.1 mg/dL); CO2 24 mmol/L (Ref range: 21 - 32 mmol/L); Creatinine 0.58 mg/dL (Ref range: 0.55 - 1.02 mg/dL); Glucose 121 mg/dL (H; Ref range: 65 - 100 mg/dL); HCT 38.8 % (Ref range: 35.0 - 47.0 %); HGB 12.8 g/dL (Ref range: 11.5 - 16.0 g/dL); Potassium 4.6 mmol/L (Ref range: 3.5 - 5.1 mmol/L); Sodium 134 mmol/L (L; Ref range: 136 - 145 mmol/L)  No results for input(s): WBC, HGB, HCT, PLT, HGBEXT, HCTEXT, PLTEXT, HGBEXT, HCTEXT, PLTEXT in the last 72 hours. No results for input(s): NA, K, CL, CO2, BUN, CREA, GLU, CA, MG, PHOS, URICA in the last 72 hours. No results for input(s): ALT, AP, TBIL, TBILI, TP, ALB, GLOB, GGT, AML, LPSE in the last 72 hours. No lab exists for component: SGOT, GPT, AMYP, HLPSE  No results for input(s): INR, PTP, APTT, INREXT, INREXT in the last 72 hours.    No results for input(s): FE, TIBC, PSAT, FERR in the last 72 hours. No results for input(s): PH, PCO2, PO2 in the last 72 hours. No results for input(s): CPK, CKMB in the last 72 hours.     No lab exists for component: TROPONINI  Lab Results   Component Value Date/Time    Glucose (POC) 231 (H) 12/09/2021 08:21 PM    Glucose (POC) 231 (H) 12/09/2021 12:05 PM    Glucose (POC) 121 (H) 12/09/2021 08:24 AM    Glucose (POC) 173 (H) 11/10/2021 07:41 AM       Discharge time spent 35 minutes    Signed:  Justyna Zhou MD  12/23/2021  1:44 PM

## 2021-12-23 NOTE — PROGRESS NOTES
Spiritual Care Assessment/Progress Note  [facility]      NAME: Poonam Moreno      MRN: [id number]  AGE: 77 y.o. SEX: female  Pentecostal Affiliation: Gnosticism   Language: English     12/23/2021     Total Time (in minutes): 25     Spiritual Assessment begun in [facility] through conversation with:         []Patient        [] Family    [] Friend(s)        Reason for Consult: Death, Inpatient     Spiritual beliefs: (Please include comment if needed)     [] Identifies with a deacon tradition:         [] Supported by a deacon community:            [] Claims no spiritual orientation:           [] Seeking spiritual identity:                [] Adheres to an individual form of spirituality:           [x] Not able to assess:                           Identified resources for coping:      [] Prayer                               [] Music                  [] Guided Imagery     [] Family/friends                 [] Pet visits     [] Devotional reading                         [x] Unknown     [] Other:                                          Interventions offered during this visit: (See comments for more details)    Patient Interventions: Prayer (actual)           Plan of Care:     [] Support spiritual and/or cultural needs    [] Support AMD and/or advance care planning process      [] Support grieving process   [] Coordinate Rites and/or Rituals    [] Coordination with community clergy   [] No spiritual needs identified at this time   [] Detailed Plan of Care below (See Comments)  [] Make referral to Music Therapy  [] Make referral to Pet Therapy     [] Make referral to Addiction services  [] Make referral to [facility]  [] Make referral to Spiritual Care Partner  [] No future visits requested        [x] Contact Spiritual Care for further referrals     Comments: present at time of death, no family at this time.  offered a prayer and ministry of present. Advised nurse to contact Spiritual Care for any further referrals.     [address], [facility]    Please [facility]  in order to get in touch with  for any Spiritual Care Needs   [phone]   OR   Reach out to us on [address] [facility]

## 2021-12-23 NOTE — PROGRESS NOTES
CM made aware by floor RN that patient has passed away this morning. CM made [facility] RN, Carley Chen aware.

## 2023-01-26 NOTE — PROGRESS NOTES
Hospitalist Progress Note               Daily Progress Note: 12/22/2021      Subjective: The patient is seen for follow up. She is a 45-year-old female with history of COPD and hypertension, brought to the ED overnight with shortness of breath and generalized weakness. She has had cough as well. Arrival she had oxygen saturations in the 80s on room air. She tested positive for COVID-19. Multiple family members are also positive. CTA was negative for PE. She was also found to have rapid atrial fibrillation which is new for her    Patient is unvaccinated for Covid. She had been sick for 2 or 3 days    Patient was admitted, started on Remdesivir and steroids along with baricitinib    Patient has continued to require a Ventimask after initially showing some improvement in her oxygenation    She developed a new infiltrate at the right lung base concerning for aspiration she was started on Zosyn for this    Patient is on chronic tube feeds at home and is n.p.o.  -------    Patient seen today for follow-up. Her condition has worsened overnight. She was titrated up on her oxygen and currently is on a 100% nonrebreather mask. She is more lethargic.   Still with very loose, congested cough    Started on noninvasive ventilation, currently with an FiO2 of 90%    We have transferred her to the ICU     Problem List:  Problem List as of 12/22/2021 Date Reviewed: 11/12/2021          Codes Class Noted - Resolved    New onset atrial fibrillation ([facility] ICD-10-CM: I48.91  ICD-9-CM: 427.31  12/8/2021 - Present        Atrial fibrillation with RVR ([facility] ICD-10-CM: I48.91  ICD-9-CM: 427.31  12/8/2021 - Present        Pneumonia due to COVID-19 virus ICD-10-CM: U07.1, J12.82  ICD-9-CM: 480.8, 079.89  12/8/2021 - Present        Dysphagia ICD-10-CM: R13.10  ICD-9-CM: 787.20  11/10/2021 - Present        Failure to thrive (child) ICD-10-CM: R62.51  ICD-9-CM: 783.41  11/10/2021 - Present        Severe protein-calorie malnutrition ([place] [address].) ICD-10-CM: E43  ICD-9-CM: 262  11/10/2021 - Present        Unintentional weight loss ICD-10-CM: R63.4  ICD-9-CM: 783.21  11/10/2021 - Present        COPD with acute exacerbation ([place] [address].) ICD-10-CM: J44.1  ICD-9-CM: 491.21  11/9/2021 - Present        Acute respiratory failure with hypoxia [facility]) ICD-10-CM: J96.01  ICD-9-CM: 518.81  11/9/2021 - Present        Hypertension ICD-10-CM: I10  ICD-9-CM: 401.9  11/9/2021 - Present        Tobacco abuse ICD-10-CM: Z72.0  ICD-9-CM: 305.1  11/9/2021 - Present        Osteoporosis ICD-10-CM: M81.0  ICD-9-CM: 733.00  11/9/2021 - Present        Community acquired pneumonia ICD-10-CM: J18.9  ICD-9-CM: 229  11/9/2021 - Present        Bronchiectasis ([place] [address].) ICD-10-CM: J47.9  ICD-9-CM: 494.0  11/9/2021 - Present              Medications reviewed  Current Facility-Administered Medications   Medication Dose Route Frequency    dexamethasone (PF) (DECADRON) 10 mg/mL injection 4 mg  4 mg IntraVENous Q12H    ALPRAZolam (XANAX) tablet 1 mg  1 mg Oral Q6H PRN    enoxaparin (LOVENOX) injection 40 mg  40 mg SubCUTAneous Q24H    bisacodyL (DULCOLAX) suppository 10 mg  10 mg Rectal DAILY PRN    docusate (COLACE) 50 mg/5 mL oral liquid 100 mg  100 mg Per G Tube BID    ascorbic acid (vitamin C) (VITAMIN C) tablet 500 mg  500 mg Per G Tube BID    cholecalciferol (VITAMIN D3) (1000 Units /25 mcg) tablet 2,000 Units  2,000 Units Oral DAILY    guaiFENesin ER (MUCINEX) tablet 600 mg  600 mg Oral BID    famotidine (PEPCID) tablet 20 mg  20 mg Oral BID    albuterol (PROVENTIL HFA, VENTOLIN HFA, PROAIR HFA) inhaler 2 Puff  2 Puff Inhalation Q6H RT    budesonide-formoteroL (SYMBICORT) 160-4.5 mcg/actuation HFA inhaler 2 Puff  2 Puff Inhalation BID RT    piperacillin-tazobactam (ZOSYN) 3.375 g in 0.9% sodium chloride (MBP/ADV) 100 mL MBP  3.375 g IntraVENous Q8H    hydrOXYzine pamoate (VISTARIL) capsule 25 mg  25 mg Per G Tube Q6H PRN    guaiFENesin-codeine (ROBITUSSIN AC) 100-10 mg/5 mL solution 5 mL  5 mL Per G Tube Q6H PRN    dextromethorphan (DELSYM) 30 mg/5 mL syrup 30 mg  30 mg Per G Tube Q12H    lisinopriL (PRINIVIL, ZESTRIL) tablet 5 mg  5 mg Oral DAILY    metoprolol tartrate (LOPRESSOR) tablet 50 mg  50 mg Oral BID    sodium chloride (NS) flush 5-40 mL  5-40 mL IntraVENous Q8H    sodium chloride (NS) flush 5-40 mL  5-40 mL IntraVENous PRN    acetaminophen (TYLENOL) tablet 650 mg  650 mg Oral Q6H PRN    Or    acetaminophen (TYLENOL) suppository 650 mg  650 mg Rectal Q6H PRN    polyethylene glycol (MIRALAX) packet 17 g  17 g Oral DAILY PRN    ondansetron (ZOFRAN ODT) tablet 4 mg  4 mg Oral Q8H PRN    Or    ondansetron (ZOFRAN) injection 4 mg  4 mg IntraVENous Q6H PRN    dilTIAZem ER (CARDIZEM CD) capsule 120 mg  120 mg Oral DAILY       Review of Systems:   A comprehensive review of systems was negative except for that written in the HPI. Objective:   Physical Exam:     Visit Vitals  /63   Pulse 96   Temp 97.6 °F (36.4 °C)   Resp 22   Ht 5' 5\" (1.651 m)   Wt 39.9 kg (87 lb 15.4 oz)   SpO2 92%   Breastfeeding No   BMI 14.64 kg/m²    O2 Flow Rate (L/min): 15 l/min O2 Device: BIPAP    Temp (24hrs), Av.5 °F (36.4 °C), Min:97.4 °F (36.3 °C), Max:97.6 °F (36.4 °C)    No intake/output data recorded.  1901 -  0700  In: 1270   Out: 600 [Urine:600]    General:   Frail and cachectic in appearance   Lungs: Few rhonchi bilateral   Chest wall:  No tenderness or deformity. Heart:  Regular rate and rhythm, S1, S2 normal, no murmur, click, rub or gallop. Abdomen:   Soft, non-tender. Bowel sounds normal. No masses,  No organomegaly. Extremities: Extremities normal, atraumatic, no cyanosis or edema. Pulses: 2+ and symmetric all extremities. Skin: Skin color, texture, turgor normal. No rashes or lesions   Neurologic: CNII-XII intact.   No gross focal deficits         Data Review:       Recent Days:  No results for input(s): WBC, HGB, HCT, PLT, HGBEXT, HCTEXT, PLTEXT, HGBEXT, HCTEXT, PLTEXT in the last 72 hours. No results for input(s): NA, K, CL, CO2, GLU, BUN, CREA, CA, MG, PHOS, ALB, TBIL, TBILI, ALT, INR, INREXT, INREXT in the last 72 hours. No lab exists for component: SGOT  No results for input(s): PH, PCO2, PO2, HCO3, FIO2 in the last 72 hours. 24 Hour Results:  No results found for this or any previous visit (from the past 24 hour(s)). XR CHEST PORT   Final Result   Opacities in lungs with interval increase at the right base. XR CHEST PORT   Final Result   Diffuse and predominantly nodular appearing bilateral lower lobe   opacities. In the acute setting pneumonia or aspiration would be considered. Follow-up to resolution to exclude other active disease. .       XR CHEST PORT   Final Result   Significant improvement in appearance of basilar predominant opacities. XR CHEST PORT   Final Result      XR ABD (KUB)   Final Result   No acute process. Constipation. Atherosclerosis. XR CHEST PORT   Final Result   Suspect worsening pneumonia. CTA CHEST W OR W WO CONT   Final Result      No pulmonary emboli. Patchy opacities in the lungs may be due to atelectasis, pneumonia or a   combination of these. Pulmonary emphysema. Atherosclerosis including coronary   arteries. Follow-up as clinically indicated. Please see full report. XR CHEST PORT   Final Result      Pulmonary emphysema. Opacities in the lungs may represent atelectasis, pneumonia   or a combination of these. Follow-up as clinically indicated.       XR CHEST PORT    (Results Pending)        Assessment:  COVID-19 with pneumonitis,  severe disease    Acute respiratory failure with hypoxia    Suspected aspiration pneumonia right lower lobe    COPD with exacerbation    Protein calorie malnutrition    Atrial fibrillation with RVR, now back in sinus rhythm    Dehydration with hyponatremia, improved    Hypertension    Chronic PEG tube feedings due to history of dysphagia    Plan:  Continue  Dexamethasone  and baricitinib  Continue Jevity tube feeds  Continue Zosyn  Continue noninvasive ventilation    Now in ICU    Care Plan discussed with: Patient/Family     Prognosis appears very poor. I spoke with her daughter Ysabel Kline, updated her on transfer to ICU and worsening respiratory status, along with very poor prognosis. She will speak with her siblings regarding CODE STATUS. Patient would be very appropriate for comfort care only at this time      Disposition: Continued inpatient care    Total time spent with patient: 30 minutes.     Felicitas Kellogg MD numerical 0-10

## (undated) DEVICE — KIT PUSH 24FR STD PEG ENDOVIVE

## (undated) DEVICE — CANNULA NSL O2 AD 7 FT END-TIDAL CARBON DIOX VENTFLO

## (undated) DEVICE — ENDO KIT 1: Brand: MEDLINE INDUSTRIES, INC.

## (undated) DEVICE — THE BITE BLOCK INTERMEDIATE, LATEX FREE STRAP IS USED TO PROTECT THE ENDOSCOPE INSERTION TUBE FROM BEING BITTEN BY THE PATIENT.